# Patient Record
Sex: FEMALE | Race: WHITE | NOT HISPANIC OR LATINO | Employment: OTHER | URBAN - NONMETROPOLITAN AREA
[De-identification: names, ages, dates, MRNs, and addresses within clinical notes are randomized per-mention and may not be internally consistent; named-entity substitution may affect disease eponyms.]

---

## 2020-07-17 ENCOUNTER — CONVERSION ENCOUNTER (OUTPATIENT)
Dept: FAMILY MEDICINE CLINIC | Age: 65
End: 2020-07-17

## 2020-07-17 ENCOUNTER — HOSPITAL ENCOUNTER (OUTPATIENT)
Dept: OTHER | Facility: HOSPITAL | Age: 65
Discharge: HOME | End: 2020-07-17
Attending: FAMILY MEDICINE

## 2020-07-19 LAB — SARS-COV-2 RNA SPEC QL NAA+PROBE: NOT DETECTED

## 2020-09-02 ENCOUNTER — HOSPITAL ENCOUNTER (OUTPATIENT)
Dept: OTHER | Facility: HOSPITAL | Age: 65
Discharge: HOME | End: 2020-09-02
Attending: FAMILY MEDICINE

## 2020-09-06 LAB — SARS-COV-2 RNA SPEC QL NAA+PROBE: NOT DETECTED

## 2021-03-18 ENCOUNTER — OFFICE VISIT CONVERTED (OUTPATIENT)
Dept: FAMILY MEDICINE CLINIC | Age: 66
End: 2021-03-18
Attending: FAMILY MEDICINE

## 2021-03-24 ENCOUNTER — HOSPITAL ENCOUNTER (OUTPATIENT)
Dept: OTHER | Facility: HOSPITAL | Age: 66
Discharge: HOME | End: 2021-03-24
Attending: FAMILY MEDICINE

## 2021-03-24 LAB
ALBUMIN SERPL-MCNC: 4.1 G/DL (ref 3.5–5)
ALBUMIN/GLOB SERPL: 1.5 {RATIO} (ref 1.4–2.6)
ALP SERPL-CCNC: 50 U/L (ref 43–160)
ALT SERPL-CCNC: 16 U/L (ref 10–40)
ANION GAP SERPL CALC-SCNC: 16 MMOL/L (ref 8–19)
AST SERPL-CCNC: 22 U/L (ref 15–50)
BILIRUB SERPL-MCNC: 0.19 MG/DL (ref 0.2–1.3)
BUN SERPL-MCNC: 23 MG/DL (ref 5–25)
BUN/CREAT SERPL: 26 {RATIO} (ref 6–20)
CALCIUM SERPL-MCNC: 9.5 MG/DL (ref 8.7–10.4)
CHLORIDE SERPL-SCNC: 104 MMOL/L (ref 99–111)
CHOLEST SERPL-MCNC: 215 MG/DL (ref 107–200)
CHOLEST/HDLC SERPL: 3.5 {RATIO} (ref 3–6)
CONV CO2: 26 MMOL/L (ref 22–32)
CONV TOTAL PROTEIN: 6.9 G/DL (ref 6.3–8.2)
CREAT UR-MCNC: 0.89 MG/DL (ref 0.5–0.9)
GFR SERPLBLD BASED ON 1.73 SQ M-ARVRAT: >60 ML/MIN/{1.73_M2}
GLOBULIN UR ELPH-MCNC: 2.8 G/DL (ref 2–3.5)
GLUCOSE SERPL-MCNC: 89 MG/DL (ref 65–99)
HDLC SERPL-MCNC: 61 MG/DL (ref 40–60)
LDLC SERPL CALC-MCNC: 140 MG/DL (ref 70–100)
OSMOLALITY SERPL CALC.SUM OF ELEC: 295 MOSM/KG (ref 273–304)
POTASSIUM SERPL-SCNC: 5 MMOL/L (ref 3.5–5.3)
SODIUM SERPL-SCNC: 141 MMOL/L (ref 135–147)
TRIGL SERPL-MCNC: 68 MG/DL (ref 40–150)
VLDLC SERPL-MCNC: 14 MG/DL (ref 5–37)

## 2021-04-07 ENCOUNTER — OFFICE VISIT CONVERTED (OUTPATIENT)
Dept: FAMILY MEDICINE CLINIC | Age: 66
End: 2021-04-07
Attending: FAMILY MEDICINE

## 2021-04-07 ENCOUNTER — HOSPITAL ENCOUNTER (OUTPATIENT)
Dept: OTHER | Facility: HOSPITAL | Age: 66
Discharge: HOME | End: 2021-04-07
Attending: FAMILY MEDICINE

## 2021-04-13 ENCOUNTER — HOSPITAL ENCOUNTER (OUTPATIENT)
Dept: OTHER | Facility: HOSPITAL | Age: 66
Discharge: HOME | End: 2021-04-13
Attending: FAMILY MEDICINE

## 2021-04-16 LAB
ALP SERPL-CCNC: 50 U/L (ref 43–160)
ASO AB SERPL-ACNC: 28 [IU]/ML (ref 0–200)
CALCIUM SERPL-MCNC: 9.2 MG/DL (ref 8.7–10.4)
CONV RHEUMATOID FACTOR IGM: 223.5 [IU]/ML (ref 0–14)
CRP SERPL-MCNC: <3 MG/L (ref 0–5)
DSDNA AB SER-ACNC: NEGATIVE [IU]/ML
ENA AB SER IA-ACNC: NEGATIVE {RATIO}
ERYTHROCYTE [SEDIMENTATION RATE] IN BLOOD: 5 MM/H (ref 0–30)
PHOSPHATE SERPL-MCNC: 3.8 MG/DL (ref 2.4–4.5)
URATE SERPL-MCNC: 6.1 MG/DL (ref 2.5–7.5)

## 2021-05-18 NOTE — PROGRESS NOTES
Jaqueline Katz  1955     Office/Outpatient Visit    Visit Date:  11:30 am    Provider: Rosalinda Irizarry MD (Assistant: Sangeetha Allred MA)    Location: [facility]        Electronically signed by Rosalinda Irizarry MD on  2021 12:04:54 PM                             Subjective:        CC: Destiny is a 65 year old female.  Patient presents today with complaints of arthritis issues;         HPI: Destiny is here today with complaint of hand pain that she feels is due to arthritis.  She has had pain in the bilateral hands, particularly in the knuckles.  Swelling in the third finger of the R hand after she jammed it over  weekend.  She has also noticed pain and swelling at the base of the R thumb.  She is right-handed.  She has some numbness in the L hand.        She has tried topical Voltaren OTC from the pharmacy on the hands and feet but it has only been minimally effective.  She has also had some swelling on the ball of the foot and pain that she has noticed when she goes walking in the park.        She has tried naproxen OTC but that flared her GERD up and gave her stomach pain.    ROS:     CONSTITUTIONAL:  Negative for fatigue and fever.      CARDIOVASCULAR:  Negative for chest pain and palpitations.      RESPIRATORY:  Negative for recent cough and dyspnea.      MUSCULOSKELETAL:  Positive for hand pain.   Negative for arthralgias or myalgias.      NEUROLOGICAL:  Negative for paresthesias and weakness.          Past Medical History / Family History / Social History:         Last Reviewed on 2021 11:36 AM by Rosalinda Irizarry    Past Medical History:                 PAST MEDICAL HISTORY         Hypertension     Gastroesophageal Reflux Disease     Osteoporosis: on Prolia;         Surgical History:         Appendectomy    Cholecystectomy     L shoulder surgery;         Family History:     Father: Myocardial Infarction (  at 80 yo )     Mother: Congestive Heart Failure;   Hyperthyroidism     Sister(s): Endocrine Type 2 Diabetes; Hyperthyroidism     nephew -- hyperthyroidism     aunts -- carotid artery stenosis     aunt - stroke         Social History:     Occupation: Retired (Prior occupation: 28 yo at [employer])     Marital Status:      Children: 3 children         Tobacco/Alcohol/Supplements:     Last Reviewed on 4/07/2021 11:36 AM by Rosalinda Irizarry    Tobacco: She has a past history of cigarette smoking; quit date:  2010.          Substance Abuse History:     Last Reviewed on 4/07/2021 11:36 AM by Rosalinda Irizarry        Mental Health History:     Last Reviewed on 4/07/2021 11:36 AM by Rosalinda Irizarry        Communicable Diseases (eg STDs):     Last Reviewed on 4/07/2021 11:36 AM by Rosalinda Irizarry        Current Problems:     Last Reviewed on 3/18/2021 04:59 PM by Rosalinda Irizarry    Contact with and (suspected) exposure to other viral communicable diseases    Nausea with vomiting, unspecified    Essential (primary) hypertension    Allergic rhinitis due to pollen    Gastro-esophageal reflux disease without esophagitis    Mixed irritable bowel syndrome    Age-related osteoporosis without current pathological fracture        Immunizations:     Influenza, injectable, MDCK, quadrivalent, preservative 11/1/2020    SARS-COV-2 (COVID-19) vaccine, UNSPECIFIED 3/6/2021    Influenza A (H1N1) pf, IM (3+ years) Monovalent 11/10/2009        Allergies:     Last Reviewed on 4/07/2021 11:32 AM by Sangeetha Allred    Penicillins:          Current Medications:     Last Reviewed on 4/07/2021 11:33 AM by Sangeetha Allred    calcium     allergy     B12     D3     FIBER     PROBIOTIC     DULoxetine 60MG     CAP  [TAKE 1 CAPSULE BY MOUTH ONCE DAILY FOR DEPRESSION]    ATENOLOL 25MG       TAB  [TAKE 1 TABLET BY MOUTH ONCE DAILY IN THE MORNING FOR BLOOD PRESSURE]    LISINOPRIL 20MG     TAB  [TAKE 1 TABLET BY MOUTH ONCE DAILY FOR BLOOD PRESSURE]    esomeprazole magnesium 40 mg oral  capsule,delayed release (enteric coated) [take 1 capsule (40 mg) by oral route once daily]    sucralfate 1 gram oral tablet [take 1 tablet (1 gram) by oral route 2 times per day prn nausea/GI upset]    CBD Oil   [twice daily]        Objective:        Vitals:         Current: 4/7/2021 11:33:53 AM    Ht:  4 ft, 11 in;  Wt: 114.8 lbs;  BMI: 23.2T: 97.5 F (temporal);  BP: 131/73 mm Hg (left arm, sitting);  P: 72 bpm (left arm (BP Cuff), sitting);  sCr: 0.89 mg/dL;  GFR: 52.10        Exams:     PHYSICAL EXAM:     GENERAL: vital signs recorded - well developed, well nourished;  well groomed;  no apparent distress;     EYES: extraocular movements intact; conjunctiva and cornea are normal; PERRLA;     RESPIRATORY: normal respiratory rate and pattern with no distress;     MUSCULOSKELETAL: normal gait; normal overall tone bilateral hands -- 1st MCP swelling bilaterally, no erythema or warmth, mildly TTP; mild deformity in scattered DIPs both hands; +TTP at the R thumb base, no swelling, erythema or warmth        Assessment:         M19.042   Primary osteoarthritis, left hand       M19.041   Primary osteoarthritis, right hand       M19.071   Primary osteoarthritis, right ankle and foot       M19.072   Primary osteoarthritis, left ankle and foot           ORDERS:         Meds Prescribed:       [New Rx] meloxicam 15 mg oral tablet [take 1 tablet (15 mg) by oral route once daily with food prn hand and foot pain], #30 (thirty) tablets, Refills: 5 (five)         Radiology/Test Orders:       [id number]  Xray Hand, 3 Views; Bilateral  (Send-Out)                      Plan:         Primary osteoarthritis, left handPam has been having pain diffusely in the joints of the bilateral hands as described in the HPI.  Deformity as per PEx is consistent with arthritis.  Checking XRs of the hands to rule out lytic changes in the MCPs, but she states she has been diagnosed with OA in the past.  Starting daily meloxicam prn pain (take with food as she  is struggling with some GERD and could not tolerate naproxen).  This will hopefully help with the foot pain as well.  OK to continue use of topical Voltaren.        RADIOLOGY:  I have ordered a Bilateral hand hand x-ray to be done today.      FOLLOW-UP: Keep currently scheduled appointment.:.            Prescriptions:       [New Rx] meloxicam 15 mg oral tablet [take 1 tablet (15 mg) by oral route once daily with food prn hand and foot pain], #30 (thirty) tablets, Refills: 5 (five)           Orders:       [id number]  Xray Hand, 3 Views; Bilateral  (Send-Out)              Primary osteoarthritis, right handAs above.        Primary osteoarthritis, right ankle and footAs above.        Primary osteoarthritis, left ankle and footAs above.            Patient Recommendations:        For  Primary osteoarthritis, left hand:                    APPOINTMENT INFORMATION:        Monday Tuesday Wednesday Thursday Friday Saturday Sunday            Time:___________________AM  PM   Date:_____________________             Charge Capture:         Primary Diagnosis:     M19.042  Primary osteoarthritis, left hand           Orders:      [id number]  Office/outpatient visit; established patient, level 3  (In-House)              M19.041  Primary osteoarthritis, right hand     M19.071  Primary osteoarthritis, right ankle and foot     M19.072  Primary osteoarthritis, left ankle and foot

## 2021-05-18 NOTE — PROGRESS NOTES
Jaqueline Katz  1955     Office/Outpatient Visit    Visit Date: Thu, Mar 18, 2021 04:06 pm    Provider: Rosalinda Irizarry MD (Assistant: Julia Chow MA)    Location: [facility]        Electronically signed by Rosalinda Irizarry MD on  03/19/2021 10:42:47 PM                             Subjective:        CC: Destiny is a 65 year old female.  This is her first visit to the clinic.          HPI: Destiny is here today to establish care with me.  She is Denisha Katz' mother-in-law.  She was previously being seen down in [place].        She is on lisinopril and atenolol for HTN.  BP has been well controlled.  No CP, palpitations, SOB.        She is on duloxetine for mild depression.  She started having more problems with anxiety and depression when she stopped smoking.          She does have chronic allergies.   She uses an OTC antihistamine and Flonase.  She went to the [facility] 1 month ago for a sinus infection.  She was really feeling bad at that time.  She got an antibiotic at that time -- thinks it might have been cipro -- for 10 days.  Shortly after starting that, she started having nausea and vomiting.  Denisha told her that she needed to go get a COVID test, which was negative.  However, she has been continuing to have stomach upset including nausea (vomiting has resolved) as well as alternating constipation and diarrhea.  She has had problems with this in the past and has wondered if she might have IBS.  She has hemorrhoids, so she cannot tell if she has been having worsened hematochezia.  No melena at any rate.  She did go get a probiotic about 2 weeks ago and has been using that, but can't tell if she is doing better.  She is on Nexium OTC for acid reflux, which is also a problem for her.  +Bloating.        After she noticed the constipation and diarrhea, she started feeling a vaginal bulge.  Also noted clear mucus-y discharge.        She has osteoporosis in the hip on her last DEXA.  Went on  Prolia for that but has not been able to get it recently.  She was due for her shot in January.    ROS:     CONSTITUTIONAL:  Negative for fatigue and fever.      EYES:  Negative for blurred vision.      E/N/T:  Negative for diminished hearing and nasal congestion.      CARDIOVASCULAR:  Negative for chest pain and palpitations.      RESPIRATORY:  Negative for recent cough and dyspnea.      GASTROINTESTINAL:  Positive for diarrhea, nausea and vomiting.   Negative for abdominal pain or constipation.      MUSCULOSKELETAL:  Negative for arthralgias and myalgias.      NEUROLOGICAL:  Negative for paresthesias and weakness.      PSYCHIATRIC:  Negative for anxiety, depression and sleep disturbance.          Past Medical History / Family History / Social History:         Last Reviewed on 3/18/2021 04:59 PM by Rosalinda Irizarry    Past Medical History:                 PAST MEDICAL HISTORY         Hypertension     Gastroesophageal Reflux Disease     Osteoporosis: on Prolia;         Surgical History:         Appendectomy    Cholecystectomy     L shoulder surgery;         Family History:     Father: Myocardial Infarction (  at 78 yo )     Mother: Congestive Heart Failure;  Hyperthyroidism     Sister(s): Endocrine Type 2 Diabetes; Hyperthyroidism     nephew -- hyperthyroidism     aunts -- carotid artery stenosis     aunt - stroke         Social History:     Occupation: Retired (Prior occupation: 28 yo at [employer])     Marital Status:      Children: 3 children         Tobacco/Alcohol/Supplements:     Last Reviewed on 3/18/2021 04:59 PM by Rosalinda Irizarry    Tobacco: She has a past history of cigarette smoking; quit date:  .          Substance Abuse History:     Last Reviewed on 3/18/2021 04:59 PM by Rosalinda Irizarry        Mental Health History:     Last Reviewed on 3/18/2021 04:59 PM by Rosalinda Irizarry        Communicable Diseases (eg STDs):     Last Reviewed on 3/18/2021 04:59 PM by Rosalinda Irizarry         Current Problems:     Contact with and (suspected) exposure to other viral communicable diseases    Nausea with vomiting, unspecified        Immunizations:     Influenza A (H1N1) pf, IM (3+ years) Monovalent 11/10/2009        Allergies:     Last Reviewed on 3/18/2021 04:20 PM by Julia Chow    Penicillins:          Current Medications:     Last Reviewed on 3/18/2021 04:21 PM by Julia Chow    calcium     allergy     B12     D3     FIBER     PROBIOTIC     DULoxetine 60MG     CAP  [TAKE 1 CAPSULE BY MOUTH ONCE DAILY FOR DEPRESSION]    ATENOLOL 25MG       TAB  [TAKE 1 TABLET BY MOUTH ONCE DAILY IN THE MORNING FOR BLOOD PRESSURE]    LISINOPRIL 20MG     TAB  [TAKE 1 TABLET BY MOUTH ONCE DAILY FOR BLOOD PRESSURE]        Objective:        Vitals:         Current: 3/18/2021 4:19:13 PM    Wt: 117.8 lbsT: 97.6 F (temporal);  BP: 135/66 mm Hg (left arm, sitting);  P: 80 bpm (left arm (BP Cuff), sitting)        Exams:     PHYSICAL EXAM:     GENERAL: vital signs recorded - well developed, well nourished;  well groomed;  no apparent distress;     EYES: extraocular movements intact; conjunctiva and cornea are normal; PERRLA;     E/N/T: OROPHARYNX:  normal mucosa, dentition, gingiva, and posterior pharynx;     NECK: range of motion is normal; thyroid exam reveals not enlarged;     RESPIRATORY: normal respiratory rate and pattern with no distress; normal breath sounds with no rales, rhonchi, wheezes or rubs;     CARDIOVASCULAR: normal rate; rhythm is regular;  no systolic murmur; no edema;     GASTROINTESTINAL: nontender; normal bowel sounds;     LYMPHATIC: no enlargement of cervical or facial nodes;     MUSCULOSKELETAL: normal gait; normal overall tone     NEUROLOGIC: mental status: alert and oriented x 3; Reflexes: brachioradialis: 2+; knee jerks: 2+;     PSYCHIATRIC: appropriate affect and demeanor; normal psychomotor function; normal speech pattern;         Assessment:         I10   Essential (primary)  hypertension       K21.9   Gastro-esophageal reflux disease without esophagitis       K58.2   Mixed irritable bowel syndrome       J30.1   Allergic rhinitis due to pollen       M81.0   Age-related osteoporosis without current pathological fracture           ORDERS:         Meds Prescribed:       [New Rx] esomeprazole magnesium 40 mg oral capsule,delayed release (enteric coated) [take 1 capsule (40 mg) by oral route once daily], #30 (thirty) capsules, Refills: 2 (two)       [New Rx] sucralfate 1 gram oral tablet [take 1 tablet (1 gram) by oral route 2 times per day prn nausea/GI upset], #60 (sixty) tablets, Refills: 0 (zero)         Lab Orders:       [id number]  HTNLP - H CMP AND LIPID: [id number], [id number]  (Send-Out)            APPTO  Appointment need  (In-House)              Other Orders:         Depression screen negative  (In-House)                      Plan:         Essential (primary) hypertensionBP at goal.  No refills needed today.  Checking labs.  RTC 3 months to follow up on the belly.    LABORATORY:  Labs ordered to be performed today include HTN/Lipid Panel: CMP, Lipid.  MIPS PHQ-9 Depression Screening: Completed form scanned and in chart; Total Score 3; Negative Depression Screen     FOLLOW-UP: Schedule a follow-up visit in 3 months.:.  f/u GERD          Orders:       [id number]  HTNLP - HMH CMP AND LIPID: [id number], [id number]  (Send-Out)            APPTO  Appointment need  (In-House)              Depression screen negative  (In-House)              Gastro-esophageal reflux disease without esophagitisHer GERD has been much worse since stopping smoking.  Increasing her Nexium from 20 mg to 40 mg and adding sucralfate as well.  I will see her back in 3 months to see if we need to repeat her scope.  She reports last one was done 2 years ago.          Prescriptions:       [New Rx] esomeprazole magnesium 40 mg oral capsule,delayed release (enteric coated) [take 1 capsule (40 mg) by oral route once daily], #30 (thirty)  capsules, Refills: 2 (two)       [New Rx] sucralfate 1 gram oral tablet [take 1 tablet (1 gram) by oral route 2 times per day prn nausea/GI upset], #60 (sixty) tablets, Refills: 0 (zero)         Mixed irritable bowel syndromeContinue probiotic and daily fiber supplement.        Allergic rhinitis due to pollenContinue Flonase and OTC antihistamine.        Age-related osteoporosis without current pathological fractureOrder placed for Prolia.  She was due for her next injection back in January.  Labs ordered.  Records requested for DEXA with her other records.              Patient Recommendations:        For  Essential (primary) hypertension:    Schedule a follow-up visit in 3 months.                APPOINTMENT INFORMATION:        Monday Tuesday Wednesday Thursday Friday Saturday Sunday            Time:___________________AM  PM   Date:_____________________             Charge Capture:         Primary Diagnosis:     I10  Essential (primary) hypertension           Orders:      [id number]  Office/outpatient visit; established patient, level 4  (In-House)            APPTO  Appointment need  (In-House)              Depression screen negative  (In-House)              K21.9  Gastro-esophageal reflux disease without esophagitis     K58.2  Mixed irritable bowel syndrome     J30.1  Allergic rhinitis due to pollen     M81.0  Age-related osteoporosis without current pathological fracture

## 2021-06-15 ENCOUNTER — TELEPHONE (OUTPATIENT)
Dept: FAMILY MEDICINE CLINIC | Age: 66
End: 2021-06-15

## 2021-06-15 ENCOUNTER — OFFICE VISIT (OUTPATIENT)
Dept: FAMILY MEDICINE CLINIC | Age: 66
End: 2021-06-15

## 2021-06-15 VITALS
TEMPERATURE: 98.3 F | BODY MASS INDEX: 22.74 KG/M2 | WEIGHT: 112.8 LBS | SYSTOLIC BLOOD PRESSURE: 126 MMHG | HEART RATE: 67 BPM | HEIGHT: 59 IN | DIASTOLIC BLOOD PRESSURE: 71 MMHG

## 2021-06-15 DIAGNOSIS — F33.1 MODERATE EPISODE OF RECURRENT MAJOR DEPRESSIVE DISORDER (HCC): ICD-10-CM

## 2021-06-15 DIAGNOSIS — K21.9 GASTROESOPHAGEAL REFLUX DISEASE WITHOUT ESOPHAGITIS: ICD-10-CM

## 2021-06-15 DIAGNOSIS — F41.1 GENERALIZED ANXIETY DISORDER: ICD-10-CM

## 2021-06-15 DIAGNOSIS — K58.8 OTHER IRRITABLE BOWEL SYNDROME: ICD-10-CM

## 2021-06-15 DIAGNOSIS — M81.0 AGE-RELATED OSTEOPOROSIS WITHOUT CURRENT PATHOLOGICAL FRACTURE: ICD-10-CM

## 2021-06-15 DIAGNOSIS — R76.8 ELEVATED RHEUMATOID FACTOR: Primary | ICD-10-CM

## 2021-06-15 DIAGNOSIS — R76.8 ELEVATED RHEUMATOID FACTOR: ICD-10-CM

## 2021-06-15 DIAGNOSIS — M15.9 PRIMARY OSTEOARTHRITIS INVOLVING MULTIPLE JOINTS: ICD-10-CM

## 2021-06-15 DIAGNOSIS — M25.50 ARTHRALGIA, UNSPECIFIED JOINT: ICD-10-CM

## 2021-06-15 DIAGNOSIS — I10 ESSENTIAL HYPERTENSION: Primary | ICD-10-CM

## 2021-06-15 PROBLEM — F32.9 MAJOR DEPRESSIVE DISORDER: Status: ACTIVE | Noted: 2021-01-27

## 2021-06-15 PROBLEM — M15.0 PRIMARY OSTEOARTHRITIS INVOLVING MULTIPLE JOINTS: Status: ACTIVE | Noted: 2021-01-27

## 2021-06-15 PROBLEM — M19.90 OSTEOARTHRITIS: Status: ACTIVE | Noted: 2021-01-27

## 2021-06-15 PROBLEM — F33.9 RECURRENT MAJOR DEPRESSIVE DISORDER: Status: ACTIVE | Noted: 2021-01-27

## 2021-06-15 PROCEDURE — 99214 OFFICE O/P EST MOD 30 MIN: CPT | Performed by: FAMILY MEDICINE

## 2021-06-15 RX ORDER — ATENOLOL 25 MG/1
1 TABLET ORAL DAILY
COMMUNITY
Start: 2021-06-03 | End: 2021-12-22

## 2021-06-15 RX ORDER — ESOMEPRAZOLE MAGNESIUM 40 MG/1
1 CAPSULE, DELAYED RELEASE ORAL DAILY
COMMUNITY
End: 2021-06-15 | Stop reason: SDUPTHER

## 2021-06-15 RX ORDER — HYDROXYZINE PAMOATE 25 MG/1
1 CAPSULE ORAL DAILY
COMMUNITY
End: 2022-01-26 | Stop reason: SDUPTHER

## 2021-06-15 RX ORDER — MELOXICAM 15 MG/1
1 TABLET ORAL DAILY PRN
COMMUNITY
Start: 2021-06-02 | End: 2021-10-21

## 2021-06-15 RX ORDER — LOSARTAN POTASSIUM 50 MG/1
50 TABLET ORAL 2 TIMES DAILY
COMMUNITY
Start: 2021-06-02 | End: 2021-06-15 | Stop reason: SDUPTHER

## 2021-06-15 RX ORDER — ESOMEPRAZOLE MAGNESIUM 40 MG/1
40 CAPSULE, DELAYED RELEASE ORAL DAILY
Qty: 90 CAPSULE | Refills: 1 | Status: SHIPPED | OUTPATIENT
Start: 2021-06-15 | End: 2021-09-23

## 2021-06-15 RX ORDER — DENOSUMAB 60 MG/ML
1 INJECTION SUBCUTANEOUS
COMMUNITY

## 2021-06-15 RX ORDER — LOSARTAN POTASSIUM 100 MG/1
100 TABLET ORAL DAILY
Qty: 90 TABLET | Refills: 1 | Status: SHIPPED | OUTPATIENT
Start: 2021-06-15 | End: 2022-06-02

## 2021-06-15 RX ORDER — DULOXETIN HYDROCHLORIDE 60 MG/1
1 CAPSULE, DELAYED RELEASE ORAL DAILY
COMMUNITY
Start: 2021-04-18 | End: 2021-08-03 | Stop reason: SDUPTHER

## 2021-06-15 RX ORDER — OLOPATADINE HYDROCHLORIDE 1 MG/ML
SOLUTION/ DROPS OPHTHALMIC
COMMUNITY

## 2021-06-15 NOTE — PROGRESS NOTES
Chief Complaint  Hypertension    Subjective          Jaqueline Katz presents to [facility] today for routine f/u.    We diagnosed her with inflammatory arthritis after her last visit when XRs showed erosive changes in the bilateral MCPs.  She was referred to Rheum but Dr. Staci Kwan could not get her in until 2022.  She then checked into another rheumatologist in [place] (Dr. Bibi Smiley) and Mishel is working on getting that referral placed for her.  She is using the meloxicam but it is only minimally effective.  She has been using Voltaren gel with out any improvement either.  She is wearing a wrist brace to help support the R wrist.      She is on lisinopril and atenolol for HTN.  BP has been well controlled.  No CP, palpitations, or SOB.    She is on duloxetine for mild depression.  She started having more problems with anxiety and depression when she stopped smoking (back in 2013).  Lately, she does feel that she has a lot of stress, although she thinks some days that the Cymbalta does really well for her.  She has been on Xanax in the past but stopped it because she was afraid of getting addicted.  She does have hydroxyzine but has been hesitant to use it because she was afraid that might be addictive as well.      She is still having some epigastric pain.  She is on Nexium for heartburn.    She has osteoporosis in the hip on her last DEXA.  She is on Prolia.        Current Outpatient Medications:   •  atenolol (TENORMIN) 25 MG tablet, Take 1 tablet by mouth Daily., Disp: , Rfl:   •  denosumab (Prolia) 60 MG/ML solution prefilled syringe syringe, 1 mL Every 6 (Six) Months., Disp: , Rfl:   •  DULoxetine (CYMBALTA) 60 MG capsule, Take 1 capsule by mouth Daily., Disp: , Rfl:   •  esomeprazole (nexIUM) 40 MG capsule, Take 1 capsule by mouth Daily., Disp: 90 capsule, Rfl: 1  •  hydrOXYzine pamoate (VISTARIL) 25 MG capsule, Take 1 capsule by mouth Daily., Disp: , Rfl:   •  losartan  "(COZAAR) 100 MG tablet, Take 1 tablet by mouth Daily., Disp: 90 tablet, Rfl: 1  •  meloxicam (MOBIC) 15 MG tablet, Take 1 tablet by mouth Daily As Needed., Disp: , Rfl:   •  olopatadine (Patanol) 0.1 % ophthalmic solution, Patanol 0.1 % eye drops  INSTILL 1 DROP INTO AFFECTED EYE(S) BY OPHTHALMIC ROUTE 2 TIMES PER DAY AT AN INTERVAL OF 6 TO 8 HOURS, Disp: , Rfl:     Allergies:  Penicillins and Nsaids      Objective   Vital Signs:   /71 (BP Location: Right arm, Patient Position: Sitting)   Pulse 67   Temp 98.3 °F (36.8 °C) (Oral)   Ht 149.9 cm (59\")   Wt 51.2 kg (112 lb 12.8 oz)   BMI 22.78 kg/m²     Physical Exam  Vitals reviewed.   Constitutional:       General: She is not in acute distress.     Appearance: Normal appearance. She is well-developed.   HENT:      Head: Normocephalic and atraumatic.      Right Ear: External ear normal.      Left Ear: External ear normal.      Nose: Nose normal.      Mouth/Throat:      Mouth: Mucous membranes are moist.   Eyes:      Extraocular Movements: Extraocular movements intact.      Conjunctiva/sclera: Conjunctivae normal.      Pupils: Pupils are equal, round, and reactive to light.   Cardiovascular:      Rate and Rhythm: Normal rate and regular rhythm.      Heart sounds: No murmur heard.     Pulmonary:      Effort: Pulmonary effort is normal.      Breath sounds: Normal breath sounds. No wheezing, rhonchi or rales.   Abdominal:      General: Bowel sounds are normal. There is no distension.      Palpations: Abdomen is soft.      Tenderness: There is no abdominal tenderness.   Musculoskeletal:         General: Normal range of motion.   Neurological:      Mental Status: She is alert.   Psychiatric:         Mood and Affect: Affect normal.             Assessment and Plan    Diagnoses and all orders for this visit:    1. Essential hypertension (Primary)  Assessment & Plan:  BP at goal.  She has been taking the losartan 50 mg BID, so I am going to send in 100 mg daily for " her to simplify her regimen.  No changes to atenolol.  Labs reviewed and  UTD.  RTC 4 months for AWV.    Orders:  -     losartan (COZAAR) 100 MG tablet; Take 1 tablet by mouth Daily.  Dispense: 90 tablet; Refill: 1    2. Primary osteoarthritis involving multiple joints  Assessment & Plan:  It looks like she may also have inflammatory rheumatoid arthritis based on imaging and labs.  Meloxicam and Voltaren gel have been ineffective for her symptom control.  We are proceeding with referral to rheumatology per elevated rheumatoid factor plan.      3. Moderate episode of recurrent major depressive disorder (CMS/HCC)  Assessment & Plan:  Mood is pretty good but anxiety is giving her trouble.        4. Generalized anxiety disorder  Assessment & Plan:  As per depression plan.      5. Gastroesophageal reflux disease without esophagitis  Assessment & Plan:  She is not sure if she is feeling anxiety vs GERD.  Refill sent on Nexium and we will continue to observe as we get her anxiety under better control.    Orders:  -     esomeprazole (nexIUM) 40 MG capsule; Take 1 capsule by mouth Daily.  Dispense: 90 capsule; Refill: 1    6. Other irritable bowel syndrome    7. Age-related osteoporosis without current pathological fracture    8. Elevated rheumatoid factor  Assessment & Plan:  She has erosive changes in the bilateral MCPs on hand XRs along with elevated rheumatoid factor on recent labs.  She would like to be seen by Dr. Bibi Smiley Rheum in [place] so that referral has been placed.  We will check in and see how that is going.        Follow Up   Return in about 4 months (around 10/15/2021) for Medicare Wellness.  Patient was given instructions and counseling regarding her condition or for health maintenance advice. Please see specific information pulled into the AVS if appropriate.

## 2021-06-15 NOTE — ASSESSMENT & PLAN NOTE
She is not sure if she is feeling anxiety vs GERD.  Refill sent on Nexium and we will continue to observe as we get her anxiety under better control.

## 2021-06-15 NOTE — ASSESSMENT & PLAN NOTE
She has erosive changes in the bilateral MCPs on hand XRs along with elevated rheumatoid factor on recent labs.  She would like to be seen by Dr. Bibi Smiley Rheum in [place] so that referral has been placed.  We will check in and see how that is going.

## 2021-06-15 NOTE — ASSESSMENT & PLAN NOTE
It looks like she may also have inflammatory rheumatoid arthritis based on imaging and labs.  Meloxicam and Voltaren gel have been ineffective for her symptom control.  We are proceeding with referral to rheumatology per elevated rheumatoid factor plan.

## 2021-06-16 RX ORDER — LOSARTAN POTASSIUM 50 MG/1
TABLET ORAL
Qty: 30 TABLET | Refills: 5 | OUTPATIENT
Start: 2021-06-16

## 2021-07-02 VITALS
WEIGHT: 117.8 LBS | DIASTOLIC BLOOD PRESSURE: 66 MMHG | SYSTOLIC BLOOD PRESSURE: 135 MMHG | TEMPERATURE: 97.6 F | HEART RATE: 80 BPM

## 2021-07-02 VITALS
HEIGHT: 59 IN | BODY MASS INDEX: 23.14 KG/M2 | DIASTOLIC BLOOD PRESSURE: 73 MMHG | SYSTOLIC BLOOD PRESSURE: 131 MMHG | HEART RATE: 72 BPM | WEIGHT: 114.8 LBS | TEMPERATURE: 97.5 F

## 2021-07-02 VITALS — TEMPERATURE: 97.3 F

## 2021-07-07 ENCOUNTER — TELEPHONE (OUTPATIENT)
Dept: FAMILY MEDICINE CLINIC | Age: 66
End: 2021-07-07

## 2021-07-07 NOTE — TELEPHONE ENCOUNTER
Caller: Jaqueline Katz    Relationship: Self    Best call back number: [phone]    Who are you requesting to speak with (clinical staff, provider,  specific staff member): MEDICAL STAFF    What was the call regarding: PATIENT STATED THAT RHEUMATOLOGY OFFICE THAT SHE IS REFERRED TO NEEDS OUR OFFICE TO FILL OUT THEIR REFERRAL FORM. PLEASE CALL PATIENT TO ADVISE.

## 2021-07-07 NOTE — TELEPHONE ENCOUNTER
Spoke with pt and informed her that they will need to fax us the form they want us to fill out. Also to make sure they put her name on the referral form so we will know what pt it is for.

## 2021-08-03 RX ORDER — DULOXETIN HYDROCHLORIDE 60 MG/1
60 CAPSULE, DELAYED RELEASE ORAL DAILY
Qty: 90 CAPSULE | Refills: 0 | Status: SHIPPED | OUTPATIENT
Start: 2021-08-03 | End: 2021-11-09

## 2021-08-03 NOTE — TELEPHONE ENCOUNTER
Caller: Sterling Jaqueline    Relationship: Self    Best call back number: [phone]    Medication needed:   Requested Prescriptions     Pending Prescriptions Disp Refills   • DULoxetine (CYMBALTA) 60 MG capsule       Sig: Take 1 capsule by mouth Daily.       When do you need the refill by: 08/10        Does the patient have less than a 3 day supply:  [] Yes  [x] No    What is the patient's preferred pharmacy: [address] - [phone] [facility] [phone] FX

## 2021-08-13 PROBLEM — M19.90 INFLAMMATORY ARTHRITIS: Status: ACTIVE | Noted: 2021-08-13

## 2021-09-23 DIAGNOSIS — K21.9 GASTROESOPHAGEAL REFLUX DISEASE WITHOUT ESOPHAGITIS: ICD-10-CM

## 2021-09-23 RX ORDER — ESOMEPRAZOLE MAGNESIUM 40 MG/1
CAPSULE, DELAYED RELEASE ORAL
Qty: 90 CAPSULE | Refills: 1 | Status: SHIPPED | OUTPATIENT
Start: 2021-09-23 | End: 2022-04-26

## 2021-10-21 ENCOUNTER — OFFICE VISIT (OUTPATIENT)
Dept: FAMILY MEDICINE CLINIC | Age: 66
End: 2021-10-21

## 2021-10-21 VITALS
SYSTOLIC BLOOD PRESSURE: 122 MMHG | DIASTOLIC BLOOD PRESSURE: 71 MMHG | HEIGHT: 59 IN | WEIGHT: 111 LBS | HEART RATE: 70 BPM | BODY MASS INDEX: 22.38 KG/M2

## 2021-10-21 DIAGNOSIS — M05.742 RHEUMATOID ARTHRITIS INVOLVING BOTH HANDS WITH POSITIVE RHEUMATOID FACTOR (HCC): ICD-10-CM

## 2021-10-21 DIAGNOSIS — F41.1 GENERALIZED ANXIETY DISORDER: ICD-10-CM

## 2021-10-21 DIAGNOSIS — H61.23 BILATERAL IMPACTED CERUMEN: ICD-10-CM

## 2021-10-21 DIAGNOSIS — I10 ESSENTIAL HYPERTENSION: ICD-10-CM

## 2021-10-21 DIAGNOSIS — Z11.59 ENCOUNTER FOR SCREENING FOR OTHER VIRAL DISEASES: ICD-10-CM

## 2021-10-21 DIAGNOSIS — Z00.00 ANNUAL PHYSICAL EXAM: Primary | ICD-10-CM

## 2021-10-21 DIAGNOSIS — M05.741 RHEUMATOID ARTHRITIS INVOLVING BOTH HANDS WITH POSITIVE RHEUMATOID FACTOR (HCC): ICD-10-CM

## 2021-10-21 PROBLEM — R76.8 ELEVATED RHEUMATOID FACTOR: Status: RESOLVED | Noted: 2021-06-15 | Resolved: 2021-10-21

## 2021-10-21 PROCEDURE — G0439 PPPS, SUBSEQ VISIT: HCPCS | Performed by: FAMILY MEDICINE

## 2021-10-21 PROCEDURE — 69209 REMOVE IMPACTED EAR WAX UNI: CPT | Performed by: FAMILY MEDICINE

## 2021-10-21 PROCEDURE — 1125F AMNT PAIN NOTED PAIN PRSNT: CPT | Performed by: FAMILY MEDICINE

## 2021-10-21 PROCEDURE — 1160F RVW MEDS BY RX/DR IN RCRD: CPT | Performed by: FAMILY MEDICINE

## 2021-10-21 PROCEDURE — 1170F FXNL STATUS ASSESSED: CPT | Performed by: FAMILY MEDICINE

## 2021-10-21 RX ORDER — BUSPIRONE HYDROCHLORIDE 5 MG/1
5 TABLET ORAL 2 TIMES DAILY PRN
Qty: 60 TABLET | Refills: 5 | Status: SHIPPED | OUTPATIENT
Start: 2021-10-21 | End: 2022-01-26 | Stop reason: SDUPTHER

## 2021-10-21 RX ORDER — PREDNISONE 1 MG/1
10 TABLET ORAL DAILY
COMMUNITY
Start: 2021-09-28 | End: 2022-01-26 | Stop reason: SDUPTHER

## 2021-10-21 NOTE — PROGRESS NOTES
Ear Cerumen Removal    Date/Time: 10/21/2021 11:26 AM  Performed by: Rosalinda Irizarry MD  Authorized by: Rosalinda Irizarry MD     Anesthesia:  Local Anesthetic: none  Location details: right ear and left ear  Patient tolerance: patient tolerated the procedure well with no immediate complications  Procedure type: irrigation   Sedation:  Patient sedated: no

## 2021-10-21 NOTE — PROGRESS NOTES
The ABCs of the Annual Wellness Visit  Subsequent Medicare Wellness Visit    Chief Complaint   Patient presents with   • Medicare Wellness-subsequent     Yearly MCW      Subjective    History of Present Illness:  Jaqueline Katz is a 66 y.o. female who presents for a Subsequent Medicare Wellness Visit.    She is reportedly UTD on colonoscopy, last done at [facility] she thinks less than 5 years ago.  Pap smears no longer indicated by age and history.  She is reportedly UTD on mammogram, last done Nov 2020/Jan 2021 at [facility], sometime over the winter, and that was normal.  She is reportedly UTD on DEXA, last done within the past 2 years at [facility].  She is on Prolia for osteoporosis.  She is up-to-date on Covid (Moderna), Pneumovax (1/2021) and Prevnar (1/2020).  She is due for Shingrix, Td and high-dose flu.  She is due for routine labs including CMP, lipids and hep C.    She is now following with Dr. Tony Faria [facility] ([facility]) for her RA.  She is on prednisone currently but planning to transition to methotrexate.  She goes back to in 2 months and will be getting standing order for labs monthly.    The following portions of the patient's history were reviewed and   updated as appropriate: allergies, current medications, past family history, past medical history, past social history, past surgical history and problem list.    Compared to one year ago, the patient feels her physical   health is the same.    Compared to one year ago, the patient feels her mental   health is worse.    Recent Hospitalizations:  She was not admitted to the hospital during the last year.       Current Medical Providers:  Patient Care Team:  Rosalinda Irizarry MD as PCP - General (Family Medicine)    Outpatient Medications Prior to Visit   Medication Sig Dispense Refill   • atenolol (TENORMIN) 25 MG tablet Take 1 tablet by mouth Daily.     • denosumab (Prolia) 60 MG/ML solution prefilled syringe  syringe 1 mL Every 6 (Six) Months.     • DULoxetine (CYMBALTA) 60 MG capsule Take 1 capsule by mouth Daily. 90 capsule 0   • esomeprazole (nexIUM) 40 MG capsule TAKE ONE CAPSULE BY MOUTH EVERY DAY 90 capsule 1   • hydrOXYzine pamoate (VISTARIL) 25 MG capsule Take 1 capsule by mouth Daily.     • losartan (COZAAR) 100 MG tablet Take 1 tablet by mouth Daily. 90 tablet 1   • olopatadine (Patanol) 0.1 % ophthalmic solution Patanol 0.1 % eye drops   INSTILL 1 DROP INTO AFFECTED EYE(S) BY OPHTHALMIC ROUTE 2 TIMES PER DAY AT AN INTERVAL OF 6 TO 8 HOURS     • predniSONE (DELTASONE) 5 MG tablet Take 10 mg by mouth Daily.     • meloxicam (MOBIC) 15 MG tablet Take 1 tablet by mouth Daily As Needed.       No facility-administered medications prior to visit.       No opioid medication identified on active medication list. I have reviewed chart for other potential  high risk medication/s and harmful drug interactions in the elderly.          Aspirin is not on active medication list.  Aspirin use is not indicated based on review of current medical condition/s. Risk of harm outweighs potential benefits.  .    Patient Active Problem List   Diagnosis   • Essential hypertension   • Gastroesophageal reflux disease without esophagitis   • Primary osteoarthritis involving multiple joints   • Moderate episode of recurrent major depressive disorder (HCC)   • Generalized anxiety disorder   • Other irritable bowel syndrome   • Age-related osteoporosis without current pathological fracture   • Rheumatoid arthritis involving both hands with positive rheumatoid factor (HCC)   • Annual physical exam     Advance Care Planning  Advance Directive is not on file.  ACP discussion was held with the patient during this visit. Patient does not have an advance directive, information provided.    Review of Systems   Constitutional: Negative for chills, fatigue and fever.   HENT: Positive for ear pain (minimal, R ear) and hearing loss (worse on the R x 2  "weeks). Negative for congestion and rhinorrhea.    Eyes: Negative for pain and visual disturbance.   Respiratory: Negative for cough and shortness of breath.    Cardiovascular: Negative for chest pain and palpitations.   Gastrointestinal: Negative for abdominal pain, constipation, diarrhea, nausea and vomiting.   Genitourinary: Negative for difficulty urinating and dysuria.   Musculoskeletal: Positive for arthralgias. Negative for myalgias.   Neurological: Negative for weakness and numbness.   Psychiatric/Behavioral: Negative for dysphoric mood and sleep disturbance.        Objective    Vitals:    10/21/21 1038   BP: 122/71   BP Location: Left arm   Patient Position: Sitting   Cuff Size: Adult   Pulse: 70   Weight: 50.3 kg (111 lb)   Height: 149.9 cm (59\")     BMI Readings from Last 1 Encounters:   10/21/21 22.42 kg/m²   BMI is within normal parameters. No follow-up required.    Does the patient have evidence of cognitive impairment? No    Physical Exam  Vitals reviewed.   Constitutional:       General: She is not in acute distress.     Appearance: Normal appearance. She is well-developed.   HENT:      Head: Normocephalic and atraumatic.      Right Ear: External ear normal. There is impacted cerumen (complete).      Left Ear: External ear normal. There is impacted cerumen (partial).      Mouth/Throat:      Mouth: Mucous membranes are moist.   Eyes:      Extraocular Movements: Extraocular movements intact.      Conjunctiva/sclera: Conjunctivae normal.      Pupils: Pupils are equal, round, and reactive to light.   Cardiovascular:      Rate and Rhythm: Normal rate and regular rhythm.      Heart sounds: No murmur heard.      Pulmonary:      Effort: Pulmonary effort is normal.      Breath sounds: Normal breath sounds. No wheezing, rhonchi or rales.   Abdominal:      General: Bowel sounds are normal. There is no distension.      Palpations: Abdomen is soft.      Tenderness: There is no abdominal tenderness. "   Musculoskeletal:         General: Normal range of motion.   Skin:     General: Skin is warm and dry.   Neurological:      Mental Status: She is alert and oriented to person, place, and time.      Deep Tendon Reflexes: Reflexes normal.   Psychiatric:         Mood and Affect: Mood and affect normal.         Behavior: Behavior normal.         Thought Content: Thought content normal.         Judgment: Judgment normal.                 HEALTH RISK ASSESSMENT    Smoking Status:  Social History     Tobacco Use   Smoking Status Former Smoker   • Types: Cigarettes   • Quit date:    • Years since quittin.8   Smokeless Tobacco Never Used     Alcohol Consumption:  Social History     Substance and Sexual Activity   Alcohol Use None     Fall Risk Screen:    STEADI Fall Risk Assessment was completed, and patient is at LOW risk for falls.Assessment completed on:6/15/2021    Depression Screening:  PHQ-2/PHQ-9 Depression Screening 6/15/2021   Little interest or pleasure in doing things 0   Feeling down, depressed, or hopeless 0   Total Score 0       Health Habits and Functional and Cognitive Screening:  Functional & Cognitive Status 10/21/2021   Do you have difficulty preparing food and eating? No   Do you have difficulty bathing yourself, getting dressed or grooming yourself? No   Do you have difficulty using the toilet? No   Do you have difficulty moving around from place to place? No   Do you have trouble with steps or getting out of a bed or a chair? No   Current Diet Unhealthy Diet   Dental Exam Up to date   Eye Exam Up to date   Exercise (times per week) 4 times per week   Current Exercises Include Walking   Do you need help using the phone?  No   Are you deaf or do you have serious difficulty hearing?  No   Do you need help with transportation? No   Do you need help shopping? No   Do you need help preparing meals?  No   Do you need help with housework?  No   Do you need help with laundry? No   Do you need help taking  your medications? No   Do you need help managing money? No   Do you ever drive or ride in a car without wearing a seat belt? No   Have you felt unusual stress, anger or loneliness in the last month? No   Who do you live with? Spouse   If you need help, do you have trouble finding someone available to you? No   Have you been bothered in the last four weeks by sexual problems? No   Do you have difficulty concentrating, remembering or making decisions? No       Age-appropriate Screening Schedule:  Refer to the list below for future screening recommendations based on patient's age, sex and/or medical conditions. Orders for these recommended tests are listed in the plan section. The patient has been provided with a written plan.    Health Maintenance   Topic Date Due   • MAMMOGRAM  Never done   • DXA SCAN  Never done   • ZOSTER VACCINE (1 of 2) Never done   • TDAP/TD VACCINES (2 - Tdap) 02/07/2012   • INFLUENZA VACCINE  08/01/2021              Assessment/Plan   CMS Preventative Services Quick Reference  Risk Factors Identified During Encounter  Immunizations Discussed/Encouraged (specific Immunizations; Influenza and COVID19  The above risks/problems have been discussed with the patient.  Follow up actions/plans if indicated are seen below in the Assessment/Plan Section.  Pertinent information has been shared with the patient in the After Visit Summary.    Diagnoses and all orders for this visit:    1. Annual physical exam (Primary)  Assessment & Plan:  She is reportedly UTD on colonoscopy, last done at [facility] she thinks less than 5 years ago; requesting records.  Pap smears no longer indicated by age and history.  She is reportedly UTD on mammogram, last done Nov 2020/Jan 2021 at [place], sometime over the winter, and that was normal; requesting records.  She is reportedly UTD on DEXA, last done within the past 2 years at [place]; requesting records.  She is on Prolia for osteoporosis.  She is  up-to-date on Covid (Moderna), Pneumovax (1/2021) and Prevnar (1/2020).  She is due for Shingrix, Td and high-dose flu.  Unable to give high-dose flu today as we are out, so I have encouraged her to get this at the pharmacy.  She is due for routine labs including CMP, lipids and hep C; ordered.      2. Essential hypertension  -     Comprehensive Metabolic Panel; Future  -     Lipid Panel; Future    3. Rheumatoid arthritis involving both hands with positive rheumatoid factor (HCC)    4. Generalized anxiety disorder  -     busPIRone (BUSPAR) 5 MG tablet; Take 1 tablet by mouth 2 (Two) Times a Day As Needed (anxiety).  Dispense: 60 tablet; Refill: 5    5. Encounter for screening for other viral diseases  -     Hepatitis C Antibody; Future    6. Bilateral impacted cerumen  -     Ear Cerumen Removal      Follow Up:   Return in about 6 months (around 4/21/2022) for Recheck.     An After Visit Summary and PPPS were made available to the patient.

## 2021-10-21 NOTE — ASSESSMENT & PLAN NOTE
She is reportedly UTD on colonoscopy, last done at [facility] she thinks less than 5 years ago; requesting records.  Pap smears no longer indicated by age and history.  She is reportedly UTD on mammogram, last done Nov 2020/Jan 2021 at [place], sometime over the winter, and that was normal; requesting records.  She is reportedly UTD on DEXA, last done within the past 2 years at [place]; requesting records.  She is on Prolia for osteoporosis.  She is up-to-date on Covid (Moderna), Pneumovax (1/2021) and Prevnar (1/2020).  She is due for Shingrix, Td and high-dose flu.  Unable to give high-dose flu today as we are out, so I have encouraged her to get this at the pharmacy.  She is due for routine labs including CMP, lipids and hep C; ordered.

## 2021-11-09 RX ORDER — DULOXETIN HYDROCHLORIDE 60 MG/1
CAPSULE, DELAYED RELEASE ORAL
Qty: 90 CAPSULE | Refills: 1 | Status: SHIPPED | OUTPATIENT
Start: 2021-11-09 | End: 2021-12-08

## 2021-11-16 ENCOUNTER — CLINICAL SUPPORT (OUTPATIENT)
Dept: FAMILY MEDICINE CLINIC | Age: 66
End: 2021-11-16

## 2021-11-16 DIAGNOSIS — M81.0 AGE-RELATED OSTEOPOROSIS WITHOUT CURRENT PATHOLOGICAL FRACTURE: Primary | ICD-10-CM

## 2021-11-16 PROCEDURE — 96372 THER/PROPH/DIAG INJ SC/IM: CPT | Performed by: FAMILY MEDICINE

## 2021-12-02 ENCOUNTER — LAB (OUTPATIENT)
Dept: LAB | Facility: HOSPITAL | Age: 66
End: 2021-12-02

## 2021-12-02 ENCOUNTER — TRANSCRIBE ORDERS (OUTPATIENT)
Dept: ADMINISTRATIVE | Facility: HOSPITAL | Age: 66
End: 2021-12-02

## 2021-12-02 DIAGNOSIS — M06.9 RHEUMATOID ARTHRITIS, INVOLVING UNSPECIFIED SITE, UNSPECIFIED WHETHER RHEUMATOID FACTOR PRESENT (HCC): Primary | ICD-10-CM

## 2021-12-02 DIAGNOSIS — Z76.89 PERSONS ENCOUNTERING HEALTH SERVICES IN OTHER SPECIFIED CIRCUMSTANCES: ICD-10-CM

## 2021-12-02 DIAGNOSIS — Z76.89 ENCOUNTER FOR PERSON ENCOUNTERING HEALTH SERVICES: ICD-10-CM

## 2021-12-02 DIAGNOSIS — M06.9 RHEUMATOID ARTHRITIS, INVOLVING UNSPECIFIED SITE, UNSPECIFIED WHETHER RHEUMATOID FACTOR PRESENT (HCC): ICD-10-CM

## 2021-12-02 LAB
ALBUMIN SERPL-MCNC: 4.3 G/DL (ref 3.5–5.2)
ALP SERPL-CCNC: 44 U/L (ref 39–117)
ALT SERPL W P-5'-P-CCNC: 16 U/L (ref 1–33)
AST SERPL-CCNC: 19 U/L (ref 1–32)
BASOPHILS # BLD AUTO: 0.08 10*3/MM3 (ref 0–0.2)
BASOPHILS NFR BLD AUTO: 1.3 % (ref 0–1.5)
BILIRUB CONJ SERPL-MCNC: <0.2 MG/DL (ref 0–0.3)
BILIRUB INDIRECT SERPL-MCNC: NORMAL MG/DL
BILIRUB SERPL-MCNC: 0.2 MG/DL (ref 0–1.2)
CREAT SERPL-MCNC: 0.87 MG/DL (ref 0.57–1)
CRP SERPL-MCNC: <0.3 MG/DL (ref 0–0.5)
DEPRECATED RDW RBC AUTO: 44.3 FL (ref 37–54)
EOSINOPHIL # BLD AUTO: 0.11 10*3/MM3 (ref 0–0.4)
EOSINOPHIL NFR BLD AUTO: 1.8 % (ref 0.3–6.2)
ERYTHROCYTE [DISTWIDTH] IN BLOOD BY AUTOMATED COUNT: 14.1 % (ref 12.3–15.4)
ERYTHROCYTE [SEDIMENTATION RATE] IN BLOOD: 2 MM/HR (ref 0–30)
GFR SERPL CREATININE-BSD FRML MDRD: 65 ML/MIN/1.73
HCT VFR BLD AUTO: 38.1 % (ref 34–46.6)
HGB BLD-MCNC: 12.6 G/DL (ref 12–15.9)
IMM GRANULOCYTES # BLD AUTO: 0.01 10*3/MM3 (ref 0–0.05)
IMM GRANULOCYTES NFR BLD AUTO: 0.2 % (ref 0–0.5)
LYMPHOCYTES # BLD AUTO: 1.38 10*3/MM3 (ref 0.7–3.1)
LYMPHOCYTES NFR BLD AUTO: 22.1 % (ref 19.6–45.3)
MCH RBC QN AUTO: 29.2 PG (ref 26.6–33)
MCHC RBC AUTO-ENTMCNC: 33.1 G/DL (ref 31.5–35.7)
MCV RBC AUTO: 88.4 FL (ref 79–97)
MONOCYTES # BLD AUTO: 0.59 10*3/MM3 (ref 0.1–0.9)
MONOCYTES NFR BLD AUTO: 9.5 % (ref 5–12)
NEUTROPHILS NFR BLD AUTO: 4.07 10*3/MM3 (ref 1.7–7)
NEUTROPHILS NFR BLD AUTO: 65.1 % (ref 42.7–76)
PLATELET # BLD AUTO: 314 10*3/MM3 (ref 140–450)
PMV BLD AUTO: 9.3 FL (ref 6–12)
PROT SERPL-MCNC: 7 G/DL (ref 6–8.5)
RBC # BLD AUTO: 4.31 10*6/MM3 (ref 3.77–5.28)
WBC NRBC COR # BLD: 6.24 10*3/MM3 (ref 3.4–10.8)

## 2021-12-02 PROCEDURE — 85652 RBC SED RATE AUTOMATED: CPT

## 2021-12-02 PROCEDURE — 82565 ASSAY OF CREATININE: CPT

## 2021-12-02 PROCEDURE — 80076 HEPATIC FUNCTION PANEL: CPT

## 2021-12-02 PROCEDURE — 86140 C-REACTIVE PROTEIN: CPT

## 2021-12-02 PROCEDURE — 85025 COMPLETE CBC W/AUTO DIFF WBC: CPT

## 2021-12-02 PROCEDURE — 36415 COLL VENOUS BLD VENIPUNCTURE: CPT

## 2021-12-08 RX ORDER — DULOXETIN HYDROCHLORIDE 60 MG/1
CAPSULE, DELAYED RELEASE ORAL
Qty: 90 CAPSULE | Refills: 1 | Status: SHIPPED | OUTPATIENT
Start: 2021-12-08 | End: 2022-06-30

## 2021-12-15 DIAGNOSIS — F41.1 GENERALIZED ANXIETY DISORDER: ICD-10-CM

## 2021-12-15 RX ORDER — BUSPIRONE HYDROCHLORIDE 5 MG/1
TABLET ORAL
Qty: 180 TABLET | Refills: 5 | OUTPATIENT
Start: 2021-12-15

## 2021-12-22 RX ORDER — ATENOLOL 25 MG/1
TABLET ORAL
Qty: 90 TABLET | Refills: 1 | Status: SHIPPED | OUTPATIENT
Start: 2021-12-22 | End: 2022-01-26 | Stop reason: SDUPTHER

## 2022-01-04 ENCOUNTER — LAB (OUTPATIENT)
Dept: LAB | Facility: HOSPITAL | Age: 67
End: 2022-01-04

## 2022-01-04 DIAGNOSIS — Z11.59 ENCOUNTER FOR SCREENING FOR OTHER VIRAL DISEASES: ICD-10-CM

## 2022-01-04 DIAGNOSIS — I10 ESSENTIAL HYPERTENSION: ICD-10-CM

## 2022-01-04 DIAGNOSIS — M06.9 RHEUMATOID ARTHRITIS, INVOLVING UNSPECIFIED SITE, UNSPECIFIED WHETHER RHEUMATOID FACTOR PRESENT: ICD-10-CM

## 2022-01-04 DIAGNOSIS — Z76.89 PERSONS ENCOUNTERING HEALTH SERVICES IN OTHER SPECIFIED CIRCUMSTANCES: ICD-10-CM

## 2022-01-04 DIAGNOSIS — I10 ESSENTIAL HYPERTENSION: Primary | ICD-10-CM

## 2022-01-04 LAB
ALBUMIN SERPL-MCNC: 4.3 G/DL (ref 3.5–5.2)
ALBUMIN/GLOB SERPL: 1.8 G/DL
ALP SERPL-CCNC: 49 U/L (ref 39–117)
ALT SERPL W P-5'-P-CCNC: 24 U/L (ref 1–33)
ANION GAP SERPL CALCULATED.3IONS-SCNC: 7.3 MMOL/L (ref 5–15)
AST SERPL-CCNC: 24 U/L (ref 1–32)
BASOPHILS # BLD AUTO: 0.07 10*3/MM3 (ref 0–0.2)
BASOPHILS NFR BLD AUTO: 1.2 % (ref 0–1.5)
BILIRUB CONJ SERPL-MCNC: <0.2 MG/DL (ref 0–0.3)
BILIRUB SERPL-MCNC: 0.2 MG/DL (ref 0–1.2)
BUN SERPL-MCNC: 16 MG/DL (ref 8–23)
BUN/CREAT SERPL: 19.3 (ref 7–25)
CALCIUM SPEC-SCNC: 9.2 MG/DL (ref 8.6–10.5)
CHLORIDE SERPL-SCNC: 106 MMOL/L (ref 98–107)
CHOLEST SERPL-MCNC: 208 MG/DL (ref 0–200)
CO2 SERPL-SCNC: 26.7 MMOL/L (ref 22–29)
CREAT SERPL-MCNC: 0.83 MG/DL (ref 0.57–1)
CRP SERPL-MCNC: <0.3 MG/DL (ref 0–0.5)
DEPRECATED RDW RBC AUTO: 48.2 FL (ref 37–54)
EOSINOPHIL # BLD AUTO: 0.15 10*3/MM3 (ref 0–0.4)
EOSINOPHIL NFR BLD AUTO: 2.6 % (ref 0.3–6.2)
ERYTHROCYTE [DISTWIDTH] IN BLOOD BY AUTOMATED COUNT: 14.8 % (ref 12.3–15.4)
ERYTHROCYTE [SEDIMENTATION RATE] IN BLOOD: 6 MM/HR (ref 0–30)
GFR SERPL CREATININE-BSD FRML MDRD: 69 ML/MIN/1.73
GLOBULIN UR ELPH-MCNC: 2.4 GM/DL
GLUCOSE SERPL-MCNC: 94 MG/DL (ref 65–99)
HCT VFR BLD AUTO: 37.9 % (ref 34–46.6)
HCV AB SER DONR QL: NORMAL
HDLC SERPL-MCNC: 56 MG/DL (ref 40–60)
HGB BLD-MCNC: 12.4 G/DL (ref 12–15.9)
IMM GRANULOCYTES # BLD AUTO: 0.03 10*3/MM3 (ref 0–0.05)
IMM GRANULOCYTES NFR BLD AUTO: 0.5 % (ref 0–0.5)
LDLC SERPL CALC-MCNC: 134 MG/DL (ref 0–100)
LDLC/HDLC SERPL: 2.35 {RATIO}
LYMPHOCYTES # BLD AUTO: 1.63 10*3/MM3 (ref 0.7–3.1)
LYMPHOCYTES NFR BLD AUTO: 28.6 % (ref 19.6–45.3)
MCH RBC QN AUTO: 29.5 PG (ref 26.6–33)
MCHC RBC AUTO-ENTMCNC: 32.7 G/DL (ref 31.5–35.7)
MCV RBC AUTO: 90.2 FL (ref 79–97)
MONOCYTES # BLD AUTO: 0.53 10*3/MM3 (ref 0.1–0.9)
MONOCYTES NFR BLD AUTO: 9.3 % (ref 5–12)
NEUTROPHILS NFR BLD AUTO: 3.28 10*3/MM3 (ref 1.7–7)
NEUTROPHILS NFR BLD AUTO: 57.8 % (ref 42.7–76)
PLATELET # BLD AUTO: 298 10*3/MM3 (ref 140–450)
PMV BLD AUTO: 9.2 FL (ref 6–12)
POTASSIUM SERPL-SCNC: 3.8 MMOL/L (ref 3.5–5.2)
PROT SERPL-MCNC: 6.7 G/DL (ref 6–8.5)
RBC # BLD AUTO: 4.2 10*6/MM3 (ref 3.77–5.28)
SODIUM SERPL-SCNC: 140 MMOL/L (ref 136–145)
TRIGL SERPL-MCNC: 101 MG/DL (ref 0–150)
VLDLC SERPL-MCNC: 18 MG/DL (ref 5–40)
WBC NRBC COR # BLD: 5.69 10*3/MM3 (ref 3.4–10.8)

## 2022-01-04 PROCEDURE — 86140 C-REACTIVE PROTEIN: CPT

## 2022-01-04 PROCEDURE — 82248 BILIRUBIN DIRECT: CPT

## 2022-01-04 PROCEDURE — 85025 COMPLETE CBC W/AUTO DIFF WBC: CPT

## 2022-01-04 PROCEDURE — 36415 COLL VENOUS BLD VENIPUNCTURE: CPT

## 2022-01-04 PROCEDURE — 80061 LIPID PANEL: CPT

## 2022-01-04 PROCEDURE — 85652 RBC SED RATE AUTOMATED: CPT

## 2022-01-04 PROCEDURE — 86803 HEPATITIS C AB TEST: CPT

## 2022-01-04 PROCEDURE — 80053 COMPREHEN METABOLIC PANEL: CPT

## 2022-01-26 ENCOUNTER — OFFICE VISIT (OUTPATIENT)
Dept: FAMILY MEDICINE CLINIC | Age: 67
End: 2022-01-26

## 2022-01-26 VITALS
HEART RATE: 86 BPM | TEMPERATURE: 98.4 F | BODY MASS INDEX: 22.78 KG/M2 | HEIGHT: 59 IN | SYSTOLIC BLOOD PRESSURE: 126 MMHG | DIASTOLIC BLOOD PRESSURE: 72 MMHG | WEIGHT: 113 LBS

## 2022-01-26 DIAGNOSIS — F41.1 GENERALIZED ANXIETY DISORDER: ICD-10-CM

## 2022-01-26 DIAGNOSIS — K21.9 GASTROESOPHAGEAL REFLUX DISEASE WITHOUT ESOPHAGITIS: ICD-10-CM

## 2022-01-26 DIAGNOSIS — M05.741 RHEUMATOID ARTHRITIS INVOLVING BOTH HANDS WITH POSITIVE RHEUMATOID FACTOR: ICD-10-CM

## 2022-01-26 DIAGNOSIS — M81.0 AGE-RELATED OSTEOPOROSIS WITHOUT CURRENT PATHOLOGICAL FRACTURE: ICD-10-CM

## 2022-01-26 DIAGNOSIS — J30.1 SEASONAL ALLERGIC RHINITIS DUE TO POLLEN: ICD-10-CM

## 2022-01-26 DIAGNOSIS — I10 ESSENTIAL HYPERTENSION: Primary | ICD-10-CM

## 2022-01-26 DIAGNOSIS — M05.742 RHEUMATOID ARTHRITIS INVOLVING BOTH HANDS WITH POSITIVE RHEUMATOID FACTOR: ICD-10-CM

## 2022-01-26 DIAGNOSIS — F33.1 MODERATE EPISODE OF RECURRENT MAJOR DEPRESSIVE DISORDER: ICD-10-CM

## 2022-01-26 PROBLEM — Z00.00 ANNUAL PHYSICAL EXAM: Status: RESOLVED | Noted: 2021-10-21 | Resolved: 2022-01-26

## 2022-01-26 PROCEDURE — 99214 OFFICE O/P EST MOD 30 MIN: CPT | Performed by: FAMILY MEDICINE

## 2022-01-26 RX ORDER — BUSPIRONE HYDROCHLORIDE 5 MG/1
5 TABLET ORAL 2 TIMES DAILY PRN
Qty: 180 TABLET | Refills: 1 | Status: SHIPPED | OUTPATIENT
Start: 2022-01-26 | End: 2022-07-28 | Stop reason: SDUPTHER

## 2022-01-26 RX ORDER — METHOTREXATE 2.5 MG/1
8 TABLET ORAL WEEKLY
COMMUNITY
Start: 2021-12-27

## 2022-01-26 RX ORDER — ATENOLOL 25 MG/1
50 TABLET ORAL DAILY
Qty: 90 TABLET | Refills: 1 | Status: SHIPPED | OUTPATIENT
Start: 2022-01-26 | End: 2022-05-23

## 2022-01-26 RX ORDER — FOLIC ACID 1 MG/1
1000 TABLET ORAL DAILY
COMMUNITY
Start: 2021-12-27

## 2022-01-26 RX ORDER — MONTELUKAST SODIUM 10 MG/1
10 TABLET ORAL NIGHTLY
Qty: 90 TABLET | Refills: 3 | Status: SHIPPED | OUTPATIENT
Start: 2022-01-26 | End: 2022-10-13

## 2022-01-26 NOTE — PROGRESS NOTES
"Chief Complaint  Hypertension    Subjective          Jaqueline Katz presents to [facility] today for follow-up on routine issues.    She is on atenolol and losartan for hypertension.  Her blood pressure has been running higher for the past 2 weeks.  She denies chest pain, palpitations or shortness of air.  She has had a lot of sinus headaches and a couple of episodes where she felt just a little bit dizzy.  BP has been running 135-179/.    She is on Cymbalta for mild depression with buspirone for anxiety.  She really likes the effect of the buspirone.  \"I just feel like myself.\"  She started having more problems with anxiety and depression when she stopped smoking (back in 2013).   She has been on Xanax in the past but stopped it because she was afraid of getting addicted.  We also tried her on hydroxyzine in the past, but that was overly sedating.    She is following with Rheumatology and they have her on methotrexate for rheumatoid arthritis.    She is on Nexium for heartburn.    She has osteoporosis in the hip on her last DEXA.  She is on Prolia.        Current Outpatient Medications:   •  atenolol (TENORMIN) 25 MG tablet, Take 2 tablets by mouth Daily., Disp: 90 tablet, Rfl: 1  •  busPIRone (BUSPAR) 5 MG tablet, Take 1 tablet by mouth 2 (Two) Times a Day As Needed (anxiety)., Disp: 180 tablet, Rfl: 1  •  denosumab (Prolia) 60 MG/ML solution prefilled syringe syringe, 1 mL Every 6 (Six) Months., Disp: , Rfl:   •  DULoxetine (CYMBALTA) 60 MG capsule, TAKE ONE CAPSULE BY MOUTH EVERY DAY, Disp: 90 capsule, Rfl: 1  •  esomeprazole (nexIUM) 40 MG capsule, TAKE ONE CAPSULE BY MOUTH EVERY DAY, Disp: 90 capsule, Rfl: 1  •  folic acid (FOLVITE) 1 MG tablet, Take 1,000 mcg by mouth Daily., Disp: , Rfl:   •  losartan (COZAAR) 100 MG tablet, Take 1 tablet by mouth Daily., Disp: 90 tablet, Rfl: 1  •  methotrexate 2.5 MG tablet, Take 6 tablets by mouth 1 (One) Time Per Week., Disp: , Rfl: " "  •  olopatadine (Patanol) 0.1 % ophthalmic solution, Patanol 0.1 % eye drops  INSTILL 1 DROP INTO AFFECTED EYE(S) BY OPHTHALMIC ROUTE 2 TIMES PER DAY AT AN INTERVAL OF 6 TO 8 HOURS, Disp: , Rfl:   •  montelukast (Singulair) 10 MG tablet, Take 1 tablet by mouth Every Night., Disp: 90 tablet, Rfl: 3    Current Facility-Administered Medications:   •  denosumab (PROLIA) syringe 60 mg, 60 mg, Subcutaneous, Q6 Months, Rosalinda Irizarry MD, 60 mg at 11/16/21 1123    Allergies:  Penicillins and Nsaids      Objective   Vital Signs:   /72 (BP Location: Right arm, Patient Position: Sitting)   Pulse 86   Temp 98.4 °F (36.9 °C) (Oral)   Ht 149.9 cm (59.02\")   Wt 51.3 kg (113 lb)   BMI 22.81 kg/m²     Physical Exam  Vitals reviewed.   Constitutional:       General: She is not in acute distress.     Appearance: Normal appearance. She is well-developed.   HENT:      Head: Normocephalic and atraumatic.      Right Ear: External ear normal.      Left Ear: External ear normal.      Nose: Nose normal.      Mouth/Throat:      Mouth: Mucous membranes are moist.   Eyes:      Extraocular Movements: Extraocular movements intact.      Conjunctiva/sclera: Conjunctivae normal.      Pupils: Pupils are equal, round, and reactive to light.   Cardiovascular:      Rate and Rhythm: Normal rate and regular rhythm.      Heart sounds: No murmur heard.      Pulmonary:      Effort: Pulmonary effort is normal.      Breath sounds: Normal breath sounds. No wheezing, rhonchi or rales.   Abdominal:      General: Bowel sounds are normal. There is no distension.      Palpations: Abdomen is soft.      Tenderness: There is no abdominal tenderness.   Musculoskeletal:         General: Normal range of motion.   Neurological:      Mental Status: She is alert.   Psychiatric:         Mood and Affect: Affect normal.             Assessment and Plan    Diagnoses and all orders for this visit:    1. Essential hypertension (Primary)  Assessment & " Plan:  Increase atenolol from 25 mg to 50 mg.  Continue losartan at 100 mg.  Refills sent.  Labs are reviewed and UTD.    Orders:  -     atenolol (TENORMIN) 25 MG tablet; Take 2 tablets by mouth Daily.  Dispense: 90 tablet; Refill: 1    2. Generalized anxiety disorder  -     busPIRone (BUSPAR) 5 MG tablet; Take 1 tablet by mouth 2 (Two) Times a Day As Needed (anxiety).  Dispense: 180 tablet; Refill: 1    3. Moderate episode of recurrent major depressive disorder (HCC)  Assessment & Plan:  Stable.  No refills needed.        4. Gastroesophageal reflux disease without esophagitis  Assessment & Plan:  Stable on Nexium.  No refills needed.      5. Rheumatoid arthritis involving both hands with positive rheumatoid factor (HCC)  Assessment & Plan:  Following with Rheumatology.  On methotrexate.      6. Age-related osteoporosis without current pathological fracture    7. Seasonal allergic rhinitis due to pollen  Assessment & Plan:  Adding Singulair to her daily regimen.  If she is still not having any improvement by her next visit (in 3 mos) then we will plan to send her to Allergy for allergy testing.  She is already using OTC antihistamines and Flonase.      Orders:  -     montelukast (Singulair) 10 MG tablet; Take 1 tablet by mouth Every Night.  Dispense: 90 tablet; Refill: 3      Follow Up   Return for Recheck as scheduled 4/20/22.  Patient was given instructions and counseling regarding her condition or for health maintenance advice. Please see specific information pulled into the AVS if appropriate.

## 2022-01-26 NOTE — ASSESSMENT & PLAN NOTE
Increase atenolol from 25 mg to 50 mg.  Continue losartan at 100 mg.  Refills sent.  Labs are reviewed and UTD.

## 2022-01-26 NOTE — ASSESSMENT & PLAN NOTE
Adding Singulair to her daily regimen.  If she is still not having any improvement by her next visit (in 3 mos) then we will plan to send her to Allergy for allergy testing.  She is already using OTC antihistamines and Flonase.

## 2022-02-07 ENCOUNTER — LAB (OUTPATIENT)
Dept: LAB | Facility: HOSPITAL | Age: 67
End: 2022-02-07

## 2022-02-07 DIAGNOSIS — M06.9 RHEUMATOID ARTHRITIS, INVOLVING UNSPECIFIED SITE, UNSPECIFIED WHETHER RHEUMATOID FACTOR PRESENT: ICD-10-CM

## 2022-02-07 DIAGNOSIS — Z76.89 PERSONS ENCOUNTERING HEALTH SERVICES IN OTHER SPECIFIED CIRCUMSTANCES: ICD-10-CM

## 2022-02-07 LAB
ALBUMIN SERPL-MCNC: 4 G/DL (ref 3.5–5.2)
ALP SERPL-CCNC: 45 U/L (ref 39–117)
ALT SERPL W P-5'-P-CCNC: 19 U/L (ref 1–33)
AST SERPL-CCNC: 17 U/L (ref 1–32)
BASOPHILS # BLD AUTO: 0.1 10*3/MM3 (ref 0–0.2)
BASOPHILS NFR BLD AUTO: 1.3 % (ref 0–1.5)
BILIRUB CONJ SERPL-MCNC: <0.2 MG/DL (ref 0–0.3)
BILIRUB INDIRECT SERPL-MCNC: NORMAL MG/DL
BILIRUB SERPL-MCNC: 0.2 MG/DL (ref 0–1.2)
CREAT SERPL-MCNC: 0.95 MG/DL (ref 0.57–1)
DEPRECATED RDW RBC AUTO: 49.8 FL (ref 37–54)
EOSINOPHIL # BLD AUTO: 0.13 10*3/MM3 (ref 0–0.4)
EOSINOPHIL NFR BLD AUTO: 1.7 % (ref 0.3–6.2)
ERYTHROCYTE [DISTWIDTH] IN BLOOD BY AUTOMATED COUNT: 14.6 % (ref 12.3–15.4)
ERYTHROCYTE [SEDIMENTATION RATE] IN BLOOD: 1 MM/HR (ref 0–30)
GFR SERPL CREATININE-BSD FRML MDRD: 59 ML/MIN/1.73
HCT VFR BLD AUTO: 39.3 % (ref 34–46.6)
HGB BLD-MCNC: 12.9 G/DL (ref 12–15.9)
IMM GRANULOCYTES # BLD AUTO: 0.01 10*3/MM3 (ref 0–0.05)
IMM GRANULOCYTES NFR BLD AUTO: 0.1 % (ref 0–0.5)
LYMPHOCYTES # BLD AUTO: 1.98 10*3/MM3 (ref 0.7–3.1)
LYMPHOCYTES NFR BLD AUTO: 25.4 % (ref 19.6–45.3)
MCH RBC QN AUTO: 30.3 PG (ref 26.6–33)
MCHC RBC AUTO-ENTMCNC: 32.8 G/DL (ref 31.5–35.7)
MCV RBC AUTO: 92.3 FL (ref 79–97)
MONOCYTES # BLD AUTO: 0.62 10*3/MM3 (ref 0.1–0.9)
MONOCYTES NFR BLD AUTO: 8 % (ref 5–12)
NEUTROPHILS NFR BLD AUTO: 4.94 10*3/MM3 (ref 1.7–7)
NEUTROPHILS NFR BLD AUTO: 63.5 % (ref 42.7–76)
PLATELET # BLD AUTO: 292 10*3/MM3 (ref 140–450)
PMV BLD AUTO: 9.3 FL (ref 6–12)
PROT SERPL-MCNC: 6.5 G/DL (ref 6–8.5)
RBC # BLD AUTO: 4.26 10*6/MM3 (ref 3.77–5.28)
WBC NRBC COR # BLD: 7.78 10*3/MM3 (ref 3.4–10.8)

## 2022-02-07 PROCEDURE — 85652 RBC SED RATE AUTOMATED: CPT

## 2022-02-07 PROCEDURE — 86140 C-REACTIVE PROTEIN: CPT

## 2022-02-07 PROCEDURE — 82565 ASSAY OF CREATININE: CPT

## 2022-02-07 PROCEDURE — 85025 COMPLETE CBC W/AUTO DIFF WBC: CPT

## 2022-02-07 PROCEDURE — 36415 COLL VENOUS BLD VENIPUNCTURE: CPT

## 2022-02-07 PROCEDURE — 80076 HEPATIC FUNCTION PANEL: CPT

## 2022-02-08 LAB — CRP SERPL-MCNC: <0.3 MG/DL (ref 0–0.5)

## 2022-02-23 DIAGNOSIS — I10 ESSENTIAL HYPERTENSION: ICD-10-CM

## 2022-02-23 RX ORDER — ATENOLOL 25 MG/1
TABLET ORAL
Qty: 90 TABLET | Refills: 1 | OUTPATIENT
Start: 2022-02-23

## 2022-03-10 ENCOUNTER — LAB (OUTPATIENT)
Dept: LAB | Facility: HOSPITAL | Age: 67
End: 2022-03-10

## 2022-03-10 ENCOUNTER — TELEPHONE (OUTPATIENT)
Dept: FAMILY MEDICINE CLINIC | Age: 67
End: 2022-03-10

## 2022-03-10 DIAGNOSIS — Z87.891 PERSONAL HISTORY OF TOBACCO USE, PRESENTING HAZARDS TO HEALTH: Primary | ICD-10-CM

## 2022-03-10 DIAGNOSIS — M06.9 RHEUMATOID ARTHRITIS, INVOLVING UNSPECIFIED SITE, UNSPECIFIED WHETHER RHEUMATOID FACTOR PRESENT: ICD-10-CM

## 2022-03-10 DIAGNOSIS — Z76.89 PERSONS ENCOUNTERING HEALTH SERVICES IN OTHER SPECIFIED CIRCUMSTANCES: ICD-10-CM

## 2022-03-10 LAB
BASOPHILS # BLD AUTO: 0.08 10*3/MM3 (ref 0–0.2)
BASOPHILS NFR BLD AUTO: 1 % (ref 0–1.5)
DEPRECATED RDW RBC AUTO: 46.9 FL (ref 37–54)
EOSINOPHIL # BLD AUTO: 0.12 10*3/MM3 (ref 0–0.4)
EOSINOPHIL NFR BLD AUTO: 1.5 % (ref 0.3–6.2)
ERYTHROCYTE [DISTWIDTH] IN BLOOD BY AUTOMATED COUNT: 13.9 % (ref 12.3–15.4)
ERYTHROCYTE [SEDIMENTATION RATE] IN BLOOD: 6 MM/HR (ref 0–30)
HCT VFR BLD AUTO: 39 % (ref 34–46.6)
HGB BLD-MCNC: 12.4 G/DL (ref 12–15.9)
IMM GRANULOCYTES # BLD AUTO: 0.04 10*3/MM3 (ref 0–0.05)
IMM GRANULOCYTES NFR BLD AUTO: 0.5 % (ref 0–0.5)
LYMPHOCYTES # BLD AUTO: 2.23 10*3/MM3 (ref 0.7–3.1)
LYMPHOCYTES NFR BLD AUTO: 27.3 % (ref 19.6–45.3)
MCH RBC QN AUTO: 29.6 PG (ref 26.6–33)
MCHC RBC AUTO-ENTMCNC: 31.8 G/DL (ref 31.5–35.7)
MCV RBC AUTO: 93.1 FL (ref 79–97)
MONOCYTES # BLD AUTO: 0.67 10*3/MM3 (ref 0.1–0.9)
MONOCYTES NFR BLD AUTO: 8.2 % (ref 5–12)
NEUTROPHILS NFR BLD AUTO: 5.04 10*3/MM3 (ref 1.7–7)
NEUTROPHILS NFR BLD AUTO: 61.5 % (ref 42.7–76)
PLATELET # BLD AUTO: 315 10*3/MM3 (ref 140–450)
PMV BLD AUTO: 9.2 FL (ref 6–12)
RBC # BLD AUTO: 4.19 10*6/MM3 (ref 3.77–5.28)
WBC NRBC COR # BLD: 8.18 10*3/MM3 (ref 3.4–10.8)

## 2022-03-10 PROCEDURE — 80076 HEPATIC FUNCTION PANEL: CPT

## 2022-03-10 PROCEDURE — 82565 ASSAY OF CREATININE: CPT

## 2022-03-10 PROCEDURE — 85025 COMPLETE CBC W/AUTO DIFF WBC: CPT

## 2022-03-10 PROCEDURE — 85652 RBC SED RATE AUTOMATED: CPT

## 2022-03-10 PROCEDURE — 36415 COLL VENOUS BLD VENIPUNCTURE: CPT

## 2022-03-10 PROCEDURE — 86140 C-REACTIVE PROTEIN: CPT

## 2022-03-10 NOTE — TELEPHONE ENCOUNTER
Pt states she has a lung nodule that needs to be followed up on yearly and it is time to repeat the Chest CT.  This was found by her old MD and they have been calling her to remind her that it was due.  Last CT done at [place].  Records requested.  Please advise.

## 2022-03-10 NOTE — TELEPHONE ENCOUNTER
Will place order as soon as the CT report comes in from [place] -- I will need to know the size of the nodule we are tracking to be able to put in the dx code.  Thanks, JONATHAN

## 2022-03-10 NOTE — TELEPHONE ENCOUNTER
Caller: Jaqueline Katz    Relationship: Self    Best call back number: [phone]    What is the best time to reach you: ANYTIME     Who are you requesting to speak with (clinical staff, provider,  specific staff member): NURSE     What was the call regarding: PATIENT IS WANTING TO CHECK AND SEE IF YOU HAVE ANY PLANS TO DO A CT SCAN OF HER LUNGS.  [facility] IS CALLING STATING THAT SHE NEEDS TO HAVE ONE DONE.     Do you require a callback: YES

## 2022-03-11 LAB
ALBUMIN SERPL-MCNC: 4 G/DL (ref 3.5–5.2)
ALP SERPL-CCNC: 51 U/L (ref 39–117)
ALT SERPL W P-5'-P-CCNC: 9 U/L (ref 1–33)
AST SERPL-CCNC: 18 U/L (ref 1–32)
BILIRUB CONJ SERPL-MCNC: <0.2 MG/DL (ref 0–0.3)
BILIRUB INDIRECT SERPL-MCNC: NORMAL MG/DL
BILIRUB SERPL-MCNC: 0.2 MG/DL (ref 0–1.2)
CREAT SERPL-MCNC: 0.9 MG/DL (ref 0.57–1)
CRP SERPL-MCNC: <0.3 MG/DL (ref 0–0.5)
EGFRCR SERPLBLD CKD-EPI 2021: 70.2 ML/MIN/1.73
PROT SERPL-MCNC: 6.8 G/DL (ref 6–8.5)

## 2022-03-16 NOTE — TELEPHONE ENCOUNTER
Can you please f/u on the CT?  Or if we can't get the physical report, can someone down at [place] just read it off to us?  I just need to know the size of the pulmonary nodule.  Thanks, JONATHAN

## 2022-03-18 NOTE — TELEPHONE ENCOUNTER
Order placed for low dose CT chest lung cancer screening per recommendations on last CT.  Thanks, JONATHAN

## 2022-03-24 ENCOUNTER — HOSPITAL ENCOUNTER (OUTPATIENT)
Dept: CT IMAGING | Facility: HOSPITAL | Age: 67
Discharge: HOME | End: 2022-03-24
Admitting: FAMILY MEDICINE

## 2022-03-24 DIAGNOSIS — Z87.891 PERSONAL HISTORY OF TOBACCO USE, PRESENTING HAZARDS TO HEALTH: ICD-10-CM

## 2022-03-24 PROCEDURE — 71271 CT THORAX LUNG CANCER SCR C-: CPT

## 2022-04-20 ENCOUNTER — LAB (OUTPATIENT)
Dept: LAB | Facility: HOSPITAL | Age: 67
End: 2022-04-20

## 2022-04-20 ENCOUNTER — OFFICE VISIT (OUTPATIENT)
Dept: FAMILY MEDICINE CLINIC | Age: 67
End: 2022-04-20

## 2022-04-20 VITALS
HEART RATE: 58 BPM | TEMPERATURE: 97.6 F | WEIGHT: 114 LBS | DIASTOLIC BLOOD PRESSURE: 67 MMHG | BODY MASS INDEX: 22.98 KG/M2 | SYSTOLIC BLOOD PRESSURE: 150 MMHG | HEIGHT: 59 IN

## 2022-04-20 DIAGNOSIS — I10 ESSENTIAL HYPERTENSION: Primary | ICD-10-CM

## 2022-04-20 DIAGNOSIS — F41.1 GENERALIZED ANXIETY DISORDER: ICD-10-CM

## 2022-04-20 DIAGNOSIS — M81.0 AGE-RELATED OSTEOPOROSIS WITHOUT CURRENT PATHOLOGICAL FRACTURE: ICD-10-CM

## 2022-04-20 DIAGNOSIS — F33.1 MODERATE EPISODE OF RECURRENT MAJOR DEPRESSIVE DISORDER: ICD-10-CM

## 2022-04-20 DIAGNOSIS — I10 ESSENTIAL HYPERTENSION: ICD-10-CM

## 2022-04-20 DIAGNOSIS — M05.741 RHEUMATOID ARTHRITIS INVOLVING BOTH HANDS WITH POSITIVE RHEUMATOID FACTOR: ICD-10-CM

## 2022-04-20 DIAGNOSIS — Z76.89 PERSONS ENCOUNTERING HEALTH SERVICES IN OTHER SPECIFIED CIRCUMSTANCES: ICD-10-CM

## 2022-04-20 DIAGNOSIS — K21.9 GASTROESOPHAGEAL REFLUX DISEASE WITHOUT ESOPHAGITIS: ICD-10-CM

## 2022-04-20 DIAGNOSIS — R20.2 PARESTHESIA OF BOTH FEET: ICD-10-CM

## 2022-04-20 DIAGNOSIS — M05.742 RHEUMATOID ARTHRITIS INVOLVING BOTH HANDS WITH POSITIVE RHEUMATOID FACTOR: ICD-10-CM

## 2022-04-20 DIAGNOSIS — M06.9 RHEUMATOID ARTHRITIS, INVOLVING UNSPECIFIED SITE, UNSPECIFIED WHETHER RHEUMATOID FACTOR PRESENT: ICD-10-CM

## 2022-04-20 DIAGNOSIS — J01.00 ACUTE NON-RECURRENT MAXILLARY SINUSITIS: ICD-10-CM

## 2022-04-20 LAB
ALBUMIN SERPL-MCNC: 4.4 G/DL (ref 3.5–5.2)
ALP SERPL-CCNC: 45 U/L (ref 39–117)
ALT SERPL W P-5'-P-CCNC: 17 U/L (ref 1–33)
ANION GAP SERPL CALCULATED.3IONS-SCNC: 9.1 MMOL/L (ref 5–15)
AST SERPL-CCNC: 20 U/L (ref 1–32)
BASOPHILS # BLD AUTO: 0.05 10*3/MM3 (ref 0–0.2)
BASOPHILS NFR BLD AUTO: 1 % (ref 0–1.5)
BILIRUB CONJ SERPL-MCNC: <0.2 MG/DL (ref 0–0.3)
BILIRUB INDIRECT SERPL-MCNC: NORMAL MG/DL
BILIRUB SERPL-MCNC: 0.4 MG/DL (ref 0–1.2)
BUN SERPL-MCNC: 20 MG/DL (ref 8–23)
BUN/CREAT SERPL: 24.7 (ref 7–25)
CALCIUM SPEC-SCNC: 9 MG/DL (ref 8.6–10.5)
CHLORIDE SERPL-SCNC: 108 MMOL/L (ref 98–107)
CO2 SERPL-SCNC: 22.9 MMOL/L (ref 22–29)
CREAT SERPL-MCNC: 0.81 MG/DL (ref 0.57–1)
CRP SERPL-MCNC: <0.3 MG/DL (ref 0–0.5)
DEPRECATED RDW RBC AUTO: 48.4 FL (ref 37–54)
EGFRCR SERPLBLD CKD-EPI 2021: 79.7 ML/MIN/1.73
EOSINOPHIL # BLD AUTO: 0.06 10*3/MM3 (ref 0–0.4)
EOSINOPHIL NFR BLD AUTO: 1.2 % (ref 0.3–6.2)
ERYTHROCYTE [DISTWIDTH] IN BLOOD BY AUTOMATED COUNT: 14.1 % (ref 12.3–15.4)
ERYTHROCYTE [SEDIMENTATION RATE] IN BLOOD: 5 MM/HR (ref 0–30)
FOLATE SERPL-MCNC: >20 NG/ML (ref 4.78–24.2)
GLUCOSE SERPL-MCNC: 87 MG/DL (ref 65–99)
HCT VFR BLD AUTO: 38.4 % (ref 34–46.6)
HGB BLD-MCNC: 12.3 G/DL (ref 12–15.9)
IMM GRANULOCYTES # BLD AUTO: 0.01 10*3/MM3 (ref 0–0.05)
IMM GRANULOCYTES NFR BLD AUTO: 0.2 % (ref 0–0.5)
LYMPHOCYTES # BLD AUTO: 1.27 10*3/MM3 (ref 0.7–3.1)
LYMPHOCYTES NFR BLD AUTO: 25.1 % (ref 19.6–45.3)
MCH RBC QN AUTO: 30.3 PG (ref 26.6–33)
MCHC RBC AUTO-ENTMCNC: 32 G/DL (ref 31.5–35.7)
MCV RBC AUTO: 94.6 FL (ref 79–97)
MONOCYTES # BLD AUTO: 0.31 10*3/MM3 (ref 0.1–0.9)
MONOCYTES NFR BLD AUTO: 6.1 % (ref 5–12)
NEUTROPHILS NFR BLD AUTO: 3.35 10*3/MM3 (ref 1.7–7)
NEUTROPHILS NFR BLD AUTO: 66.4 % (ref 42.7–76)
PLATELET # BLD AUTO: 267 10*3/MM3 (ref 140–450)
PMV BLD AUTO: 9.4 FL (ref 6–12)
POTASSIUM SERPL-SCNC: 4.6 MMOL/L (ref 3.5–5.2)
PROT SERPL-MCNC: 6.8 G/DL (ref 6–8.5)
RBC # BLD AUTO: 4.06 10*6/MM3 (ref 3.77–5.28)
SODIUM SERPL-SCNC: 140 MMOL/L (ref 136–145)
VIT B12 BLD-MCNC: 859 PG/ML (ref 211–946)
WBC NRBC COR # BLD: 5.05 10*3/MM3 (ref 3.4–10.8)

## 2022-04-20 PROCEDURE — 80048 BASIC METABOLIC PNL TOTAL CA: CPT

## 2022-04-20 PROCEDURE — 85025 COMPLETE CBC W/AUTO DIFF WBC: CPT

## 2022-04-20 PROCEDURE — 82607 VITAMIN B-12: CPT

## 2022-04-20 PROCEDURE — 99214 OFFICE O/P EST MOD 30 MIN: CPT | Performed by: FAMILY MEDICINE

## 2022-04-20 PROCEDURE — 80076 HEPATIC FUNCTION PANEL: CPT

## 2022-04-20 PROCEDURE — 86140 C-REACTIVE PROTEIN: CPT

## 2022-04-20 PROCEDURE — 85652 RBC SED RATE AUTOMATED: CPT

## 2022-04-20 PROCEDURE — 82746 ASSAY OF FOLIC ACID SERUM: CPT

## 2022-04-20 PROCEDURE — 36415 COLL VENOUS BLD VENIPUNCTURE: CPT

## 2022-04-20 RX ORDER — CEFDINIR 300 MG/1
300 CAPSULE ORAL 2 TIMES DAILY
Qty: 20 CAPSULE | Refills: 0 | Status: SHIPPED | OUTPATIENT
Start: 2022-04-20 | End: 2022-04-30

## 2022-04-20 RX ORDER — MONTELUKAST SODIUM 10 MG/1
1 TABLET ORAL EVERY EVENING
COMMUNITY
End: 2022-04-20 | Stop reason: SDUPTHER

## 2022-04-20 RX ORDER — BUSPIRONE HYDROCHLORIDE 7.5 MG/1
1 TABLET ORAL DAILY
COMMUNITY
End: 2022-07-28

## 2022-04-20 NOTE — ASSESSMENT & PLAN NOTE
Blood pressure is well controlled on losartan 100 mg daily and atenolol 50 mg daily.  No refills needed.  Checking labs.

## 2022-04-20 NOTE — ASSESSMENT & PLAN NOTE
Sounds like flare started with her recent infection.  Increase Nexium to BID dosing for the next 10-14 days to see if we can get things calmed back down.

## 2022-04-20 NOTE — PROGRESS NOTES
"Chief Complaint  Hypertension (6 month follow up )    Subjective          Jaqueline Katz presents to [facility] today for routine f/u of chronic issues.    She had a \"stomach virus\" two weeks ago.  Her daughter told her she had COVID.  She notes that things \"still don't taste right.\"  +Congestion and rhinorrhea also persisting.  She has had some nausea.  She was feeling better initially after a few days, but then sx worsened again.    She is on losartan and atenolol for HTN.    Blood pressure has been looking better of late.  No problems with chest pain, palpitations or shortness of breath.     She is on duloxetine and buspirone for mild depression  and anxiety.  She has used Xanax (was afraid of addiction) and hydroxyzine (overly sedating) previously.     She is now on methotrexate for rheumatoid arthritis.  Following with Rheumatology.  She was referred to Neurology for paresthesias in the hands and feet.  They diagnosed her with carpal tunnel but she does not yet have a good answer to the foot numbness.     She is on Nexium for GERD.  Her reflux has been acting up a bit for the past 2 weeks since she started feeling sick.    She is on Prolia for osteoporosis.  DEXA is up-to-date by report.      Current Outpatient Medications:   •  atenolol (TENORMIN) 25 MG tablet, Take 2 tablets by mouth Daily., Disp: 90 tablet, Rfl: 1  •  denosumab (Prolia) 60 MG/ML solution prefilled syringe syringe, 1 mL Every 6 (Six) Months., Disp: , Rfl:   •  DULoxetine (CYMBALTA) 60 MG capsule, TAKE ONE CAPSULE BY MOUTH EVERY DAY, Disp: 90 capsule, Rfl: 1  •  esomeprazole (nexIUM) 40 MG capsule, TAKE ONE CAPSULE BY MOUTH EVERY DAY, Disp: 90 capsule, Rfl: 1  •  folic acid (FOLVITE) 1 MG tablet, Take 1,000 mcg by mouth Daily., Disp: , Rfl:   •  losartan (COZAAR) 100 MG tablet, Take 1 tablet by mouth Daily., Disp: 90 tablet, Rfl: 1  •  methotrexate 2.5 MG tablet, Take 8 tablets by mouth 1 (One) Time Per Week., " "Disp: , Rfl:   •  montelukast (Singulair) 10 MG tablet, Take 1 tablet by mouth Every Night., Disp: 90 tablet, Rfl: 3  •  olopatadine (PATANOL) 0.1 % ophthalmic solution, Patanol 0.1 % eye drops  INSTILL 1 DROP INTO AFFECTED EYE(S) BY OPHTHALMIC ROUTE 2 TIMES PER DAY AT AN INTERVAL OF 6 TO 8 HOURS, Disp: , Rfl:   •  busPIRone (BUSPAR) 5 MG tablet, Take 1 tablet by mouth 2 (Two) Times a Day As Needed (anxiety)., Disp: 180 tablet, Rfl: 1  •  busPIRone (BUSPAR) 7.5 MG tablet, Take 1 tablet by mouth Daily., Disp: , Rfl:   •  cefdinir (OMNICEF) 300 MG capsule, Take 1 capsule by mouth 2 (Two) Times a Day for 10 days., Disp: 20 capsule, Rfl: 0    Current Facility-Administered Medications:   •  denosumab (PROLIA) syringe 60 mg, 60 mg, Subcutaneous, Q6 Months, Rosalinda Irizarry MD, 60 mg at 11/16/21 1123    Allergies:  Penicillins and Nsaids      Objective   Vital Signs:   Vitals:    04/20/22 0908 04/20/22 0932   BP: 146/71 150/67   BP Location: Left arm Left arm   Patient Position: Sitting Sitting   Pulse: 58 58   Temp: 97.6 °F (36.4 °C)    TempSrc: Oral    Weight: 51.7 kg (114 lb)    Height: 149.9 cm (59\")        Physical Exam  Vitals reviewed.   Constitutional:       General: She is not in acute distress.     Appearance: Normal appearance.   HENT:      Right Ear: Tympanic membrane, ear canal and external ear normal.      Left Ear: Tympanic membrane, ear canal and external ear normal.      Nose: Congestion and rhinorrhea present.      Right Turbinates: Swollen and pale.      Left Turbinates: Swollen and pale.      Right Sinus: Frontal sinus tenderness present.      Left Sinus: Maxillary sinus tenderness and frontal sinus tenderness present.      Mouth/Throat:      Mouth: Mucous membranes are moist.      Pharynx: Posterior oropharyngeal erythema present.      Tonsils: No tonsillar exudate.   Eyes:      Extraocular Movements: Extraocular movements intact.      Pupils: Pupils are equal, round, and reactive to light. "   Cardiovascular:      Rate and Rhythm: Normal rate and regular rhythm.      Heart sounds: No murmur heard.  Pulmonary:      Effort: Pulmonary effort is normal. No respiratory distress.      Breath sounds: Normal breath sounds. No wheezing, rhonchi or rales.   Abdominal:      General: Bowel sounds are normal.      Tenderness: There is no abdominal tenderness.   Musculoskeletal:         General: Normal range of motion.   Neurological:      Mental Status: She is alert.             Assessment and Plan    Diagnoses and all orders for this visit:    1. Essential hypertension (Primary)  Assessment & Plan:  Blood pressure is well controlled on losartan 100 mg daily and atenolol 50 mg daily.  No refills needed.  Checking labs.    Orders:  -     Basic Metabolic Panel; Future    2. Moderate episode of recurrent major depressive disorder (HCC)  Assessment & Plan:  Stable on duloxetine 60 mg daily and buspirone.  No refills needed today.  Continue to monitor.      3. Generalized anxiety disorder  Assessment & Plan:  As per depression plan.      4. Rheumatoid arthritis involving both hands with positive rheumatoid factor (HCC)  Assessment & Plan:  Following with Rheumatology.  On methotrexate and folic acid.      5. Gastroesophageal reflux disease without esophagitis  Assessment & Plan:  Sounds like flare started with her recent infection.  Increase Nexium to BID dosing for the next 10-14 days to see if we can get things calmed back down.      6. Age-related osteoporosis without current pathological fracture  Assessment & Plan:  Stable on Prolia.  She is due for repeat DEXA.  Order placed.    Orders:  -     DEXA Bone Density Axial; Future    7. Paresthesia of both feet  -     Vitamin B12 & Folate; Future    8. Acute non-recurrent maxillary sinusitis  -     cefdinir (OMNICEF) 300 MG capsule; Take 1 capsule by mouth 2 (Two) Times a Day for 10 days.  Dispense: 20 capsule; Refill: 0      Follow Up   Return in about 3 months (around  7/20/2022) for Recheck AND Medicare Wellness in 6 months.  Patient was given instructions and counseling regarding her condition or for health maintenance advice. Please see specific information pulled into the AVS if appropriate.

## 2022-04-25 DIAGNOSIS — K21.9 GASTROESOPHAGEAL REFLUX DISEASE WITHOUT ESOPHAGITIS: ICD-10-CM

## 2022-04-26 RX ORDER — ESOMEPRAZOLE MAGNESIUM 40 MG/1
CAPSULE, DELAYED RELEASE ORAL
Qty: 90 CAPSULE | Refills: 1 | Status: SHIPPED | OUTPATIENT
Start: 2022-04-26 | End: 2022-10-14

## 2022-05-04 ENCOUNTER — HOSPITAL ENCOUNTER (OUTPATIENT)
Dept: BONE DENSITY | Facility: HOSPITAL | Age: 67
Discharge: HOME | End: 2022-05-04
Admitting: FAMILY MEDICINE

## 2022-05-04 DIAGNOSIS — M81.0 AGE-RELATED OSTEOPOROSIS WITHOUT CURRENT PATHOLOGICAL FRACTURE: ICD-10-CM

## 2022-05-04 PROCEDURE — 77080 DXA BONE DENSITY AXIAL: CPT

## 2022-05-06 ENCOUNTER — TELEPHONE (OUTPATIENT)
Dept: FAMILY MEDICINE CLINIC | Age: 67
End: 2022-05-06

## 2022-05-06 NOTE — TELEPHONE ENCOUNTER
Pt due for Prolia 5/16/22    Last Dexa -2.5  DEXA Bone Density Axial (05/04/2022 09:14)    Last CMP- Calcium 9.0  Basic Metabolic Panel (04/20/2022 09:51)    Last office Visit   Office Visit with Rosalinda Irizarry MD (04/20/2022)

## 2022-05-19 ENCOUNTER — LAB (OUTPATIENT)
Dept: LAB | Facility: HOSPITAL | Age: 67
End: 2022-05-19

## 2022-05-19 ENCOUNTER — CLINICAL SUPPORT (OUTPATIENT)
Dept: FAMILY MEDICINE CLINIC | Age: 67
End: 2022-05-19

## 2022-05-19 DIAGNOSIS — Z76.89 PERSONS ENCOUNTERING HEALTH SERVICES IN OTHER SPECIFIED CIRCUMSTANCES: ICD-10-CM

## 2022-05-19 DIAGNOSIS — M06.9 RHEUMATOID ARTHRITIS, INVOLVING UNSPECIFIED SITE, UNSPECIFIED WHETHER RHEUMATOID FACTOR PRESENT: ICD-10-CM

## 2022-05-19 DIAGNOSIS — M81.0 AGE-RELATED OSTEOPOROSIS WITHOUT CURRENT PATHOLOGICAL FRACTURE: ICD-10-CM

## 2022-05-19 LAB
BASOPHILS # BLD AUTO: 0.06 10*3/MM3 (ref 0–0.2)
BASOPHILS NFR BLD AUTO: 0.7 % (ref 0–1.5)
CRP SERPL-MCNC: <0.3 MG/DL (ref 0–0.5)
DEPRECATED RDW RBC AUTO: 48.9 FL (ref 37–54)
EOSINOPHIL # BLD AUTO: 0.08 10*3/MM3 (ref 0–0.4)
EOSINOPHIL NFR BLD AUTO: 1 % (ref 0.3–6.2)
ERYTHROCYTE [DISTWIDTH] IN BLOOD BY AUTOMATED COUNT: 14.2 % (ref 12.3–15.4)
ERYTHROCYTE [SEDIMENTATION RATE] IN BLOOD: 1 MM/HR (ref 0–30)
HCT VFR BLD AUTO: 37.7 % (ref 34–46.6)
HGB BLD-MCNC: 12 G/DL (ref 12–15.9)
IMM GRANULOCYTES # BLD AUTO: 0.03 10*3/MM3 (ref 0–0.05)
IMM GRANULOCYTES NFR BLD AUTO: 0.4 % (ref 0–0.5)
LYMPHOCYTES # BLD AUTO: 1.77 10*3/MM3 (ref 0.7–3.1)
LYMPHOCYTES NFR BLD AUTO: 21.5 % (ref 19.6–45.3)
MCH RBC QN AUTO: 30.5 PG (ref 26.6–33)
MCHC RBC AUTO-ENTMCNC: 31.8 G/DL (ref 31.5–35.7)
MCV RBC AUTO: 95.7 FL (ref 79–97)
MONOCYTES # BLD AUTO: 0.73 10*3/MM3 (ref 0.1–0.9)
MONOCYTES NFR BLD AUTO: 8.9 % (ref 5–12)
NEUTROPHILS NFR BLD AUTO: 5.56 10*3/MM3 (ref 1.7–7)
NEUTROPHILS NFR BLD AUTO: 67.5 % (ref 42.7–76)
PLATELET # BLD AUTO: 312 10*3/MM3 (ref 140–450)
PMV BLD AUTO: 9.2 FL (ref 6–12)
RBC # BLD AUTO: 3.94 10*6/MM3 (ref 3.77–5.28)
WBC NRBC COR # BLD: 8.23 10*3/MM3 (ref 3.4–10.8)

## 2022-05-19 PROCEDURE — 85652 RBC SED RATE AUTOMATED: CPT

## 2022-05-19 PROCEDURE — 96372 THER/PROPH/DIAG INJ SC/IM: CPT | Performed by: FAMILY MEDICINE

## 2022-05-19 PROCEDURE — 85025 COMPLETE CBC W/AUTO DIFF WBC: CPT

## 2022-05-19 PROCEDURE — 36415 COLL VENOUS BLD VENIPUNCTURE: CPT

## 2022-05-19 PROCEDURE — 86140 C-REACTIVE PROTEIN: CPT

## 2022-05-21 DIAGNOSIS — I10 ESSENTIAL HYPERTENSION: ICD-10-CM

## 2022-05-23 DIAGNOSIS — I10 ESSENTIAL HYPERTENSION: ICD-10-CM

## 2022-05-23 RX ORDER — ATENOLOL 25 MG/1
TABLET ORAL
Qty: 90 TABLET | Refills: 1 | Status: SHIPPED | OUTPATIENT
Start: 2022-05-23 | End: 2022-08-17

## 2022-05-23 RX ORDER — ATENOLOL 25 MG/1
TABLET ORAL
Qty: 90 TABLET | Refills: 1 | OUTPATIENT
Start: 2022-05-23

## 2022-06-02 DIAGNOSIS — I10 ESSENTIAL HYPERTENSION: ICD-10-CM

## 2022-06-02 RX ORDER — LOSARTAN POTASSIUM 100 MG/1
TABLET ORAL
Qty: 90 TABLET | Refills: 0 | Status: SHIPPED | OUTPATIENT
Start: 2022-06-02 | End: 2022-07-28 | Stop reason: SDUPTHER

## 2022-06-06 ENCOUNTER — TRANSCRIBE ORDERS (OUTPATIENT)
Dept: ADMINISTRATIVE | Facility: HOSPITAL | Age: 67
End: 2022-06-06

## 2022-06-06 DIAGNOSIS — M06.9 RHEUMATOID ARTHRITIS, INVOLVING UNSPECIFIED SITE, UNSPECIFIED WHETHER RHEUMATOID FACTOR PRESENT: Primary | ICD-10-CM

## 2022-06-06 DIAGNOSIS — Z79.899 ENCOUNTER FOR LONG-TERM (CURRENT) USE OF OTHER MEDICATIONS: ICD-10-CM

## 2022-06-30 RX ORDER — DULOXETIN HYDROCHLORIDE 60 MG/1
CAPSULE, DELAYED RELEASE ORAL
Qty: 90 CAPSULE | Refills: 0 | Status: SHIPPED | OUTPATIENT
Start: 2022-06-30 | End: 2022-07-28 | Stop reason: SDUPTHER

## 2022-07-28 ENCOUNTER — OFFICE VISIT (OUTPATIENT)
Dept: FAMILY MEDICINE CLINIC | Age: 67
End: 2022-07-28

## 2022-07-28 ENCOUNTER — LAB (OUTPATIENT)
Dept: LAB | Facility: HOSPITAL | Age: 67
End: 2022-07-28

## 2022-07-28 VITALS
DIASTOLIC BLOOD PRESSURE: 58 MMHG | SYSTOLIC BLOOD PRESSURE: 123 MMHG | HEART RATE: 57 BPM | BODY MASS INDEX: 22.3 KG/M2 | WEIGHT: 110.6 LBS | HEIGHT: 59 IN

## 2022-07-28 DIAGNOSIS — M81.0 AGE-RELATED OSTEOPOROSIS WITHOUT CURRENT PATHOLOGICAL FRACTURE: ICD-10-CM

## 2022-07-28 DIAGNOSIS — F33.1 MODERATE EPISODE OF RECURRENT MAJOR DEPRESSIVE DISORDER: Primary | ICD-10-CM

## 2022-07-28 DIAGNOSIS — Z79.899 ENCOUNTER FOR LONG-TERM (CURRENT) USE OF OTHER MEDICATIONS: ICD-10-CM

## 2022-07-28 DIAGNOSIS — M05.742 RHEUMATOID ARTHRITIS INVOLVING BOTH HANDS WITH POSITIVE RHEUMATOID FACTOR: ICD-10-CM

## 2022-07-28 DIAGNOSIS — M05.741 RHEUMATOID ARTHRITIS INVOLVING BOTH HANDS WITH POSITIVE RHEUMATOID FACTOR: ICD-10-CM

## 2022-07-28 DIAGNOSIS — I10 ESSENTIAL HYPERTENSION: ICD-10-CM

## 2022-07-28 DIAGNOSIS — F41.1 GENERALIZED ANXIETY DISORDER: ICD-10-CM

## 2022-07-28 DIAGNOSIS — M06.9 RHEUMATOID ARTHRITIS, INVOLVING UNSPECIFIED SITE, UNSPECIFIED WHETHER RHEUMATOID FACTOR PRESENT: ICD-10-CM

## 2022-07-28 PROBLEM — Z86.16 HISTORY OF COVID-19: Status: ACTIVE | Noted: 2022-07-28

## 2022-07-28 LAB
ALBUMIN SERPL-MCNC: 4.2 G/DL (ref 3.5–5.2)
ALP SERPL-CCNC: 48 U/L (ref 39–117)
ALT SERPL W P-5'-P-CCNC: 18 U/L (ref 1–33)
AST SERPL-CCNC: 19 U/L (ref 1–32)
BASOPHILS # BLD AUTO: 0.1 10*3/MM3 (ref 0–0.2)
BASOPHILS NFR BLD AUTO: 1.4 % (ref 0–1.5)
BILIRUB CONJ SERPL-MCNC: <0.2 MG/DL (ref 0–0.3)
BILIRUB INDIRECT SERPL-MCNC: NORMAL MG/DL
BILIRUB SERPL-MCNC: 0.3 MG/DL (ref 0–1.2)
CREAT SERPL-MCNC: 0.79 MG/DL (ref 0.57–1)
CRP SERPL-MCNC: <0.3 MG/DL (ref 0–0.5)
DEPRECATED RDW RBC AUTO: 51.3 FL (ref 37–54)
EGFRCR SERPLBLD CKD-EPI 2021: 82.1 ML/MIN/1.73
EOSINOPHIL # BLD AUTO: 0.13 10*3/MM3 (ref 0–0.4)
EOSINOPHIL NFR BLD AUTO: 1.8 % (ref 0.3–6.2)
ERYTHROCYTE [DISTWIDTH] IN BLOOD BY AUTOMATED COUNT: 14.7 % (ref 12.3–15.4)
ERYTHROCYTE [SEDIMENTATION RATE] IN BLOOD: <1 MM/HR (ref 0–30)
HCT VFR BLD AUTO: 39.5 % (ref 34–46.6)
HGB BLD-MCNC: 12.5 G/DL (ref 12–15.9)
IMM GRANULOCYTES # BLD AUTO: 0.02 10*3/MM3 (ref 0–0.05)
IMM GRANULOCYTES NFR BLD AUTO: 0.3 % (ref 0–0.5)
LYMPHOCYTES # BLD AUTO: 1.61 10*3/MM3 (ref 0.7–3.1)
LYMPHOCYTES NFR BLD AUTO: 22.3 % (ref 19.6–45.3)
MCH RBC QN AUTO: 30 PG (ref 26.6–33)
MCHC RBC AUTO-ENTMCNC: 31.6 G/DL (ref 31.5–35.7)
MCV RBC AUTO: 95 FL (ref 79–97)
MONOCYTES # BLD AUTO: 0.65 10*3/MM3 (ref 0.1–0.9)
MONOCYTES NFR BLD AUTO: 9 % (ref 5–12)
NEUTROPHILS NFR BLD AUTO: 4.71 10*3/MM3 (ref 1.7–7)
NEUTROPHILS NFR BLD AUTO: 65.2 % (ref 42.7–76)
PLATELET # BLD AUTO: 272 10*3/MM3 (ref 140–450)
PMV BLD AUTO: 9.3 FL (ref 6–12)
PROT SERPL-MCNC: 6.4 G/DL (ref 6–8.5)
RBC # BLD AUTO: 4.16 10*6/MM3 (ref 3.77–5.28)
WBC NRBC COR # BLD: 7.22 10*3/MM3 (ref 3.4–10.8)

## 2022-07-28 PROCEDURE — 85652 RBC SED RATE AUTOMATED: CPT

## 2022-07-28 PROCEDURE — 82565 ASSAY OF CREATININE: CPT

## 2022-07-28 PROCEDURE — 36415 COLL VENOUS BLD VENIPUNCTURE: CPT

## 2022-07-28 PROCEDURE — 85025 COMPLETE CBC W/AUTO DIFF WBC: CPT

## 2022-07-28 PROCEDURE — 86140 C-REACTIVE PROTEIN: CPT

## 2022-07-28 PROCEDURE — 80076 HEPATIC FUNCTION PANEL: CPT

## 2022-07-28 PROCEDURE — 99214 OFFICE O/P EST MOD 30 MIN: CPT | Performed by: FAMILY MEDICINE

## 2022-07-28 RX ORDER — LOSARTAN POTASSIUM 100 MG/1
100 TABLET ORAL DAILY
Qty: 90 TABLET | Refills: 1 | Status: SHIPPED | OUTPATIENT
Start: 2022-07-28 | End: 2022-12-02

## 2022-07-28 RX ORDER — BUSPIRONE HYDROCHLORIDE 10 MG/1
10 TABLET ORAL 2 TIMES DAILY PRN
Qty: 60 TABLET | Refills: 5 | Status: SHIPPED | OUTPATIENT
Start: 2022-07-28 | End: 2022-11-11 | Stop reason: DRUGHIGH

## 2022-07-28 RX ORDER — DULOXETIN HYDROCHLORIDE 60 MG/1
60 CAPSULE, DELAYED RELEASE ORAL 2 TIMES DAILY
Qty: 180 CAPSULE | Refills: 1 | Status: SHIPPED | OUTPATIENT
Start: 2022-07-28 | End: 2022-11-29

## 2022-07-28 NOTE — ASSESSMENT & PLAN NOTE
Her depression and anxiety both have been worse since her recent bout with COVID.  This is a known complication of long COVID, so we will increase her medication at least for the next few months and then see how she does.  Hopefully we will be able to get her back down on the dosage eventually.  For her duloxetine, I am going to increase her to 60 mg twice daily and for her buspirone, we will take her up to 10 mg twice daily.  Continue to monitor.  I will see her back in 3 months.

## 2022-07-28 NOTE — PROGRESS NOTES
Chief Complaint  Hypertension (3 month follow up)    Subjective          Jaqueline Katz presents to [facility] today for follow-up of chronic issues.    She came down with COVID about a month ago.  Ever since, she has noticed more anxiety and depression.  She lost some sense of taste but that is improved.  She had some SOB during the infection itself but that seems to be better too.  The mental health complaints are what are bothering her most at this point.  Motivation is poor.  She is on duloxetine and buspirone for mild depression and anxiety.  She has used Xanax (was afraid of addiction) and hydroxyzine (overly sedating) previously.    She is on losartan and atenolol for hypertension.  Her blood pressure has been well controlled.  She denies chest pain, palpitations or shortness of breath.     She is on methotrexate for rheumatoid arthritis.  Following with Rheumatology in [place].  Going over her for lab work after this appointment for her rheumatologist.     She is on esomeprazole for GERD.  No indigestion or heartburn.     She is on Prolia for osteoporosis.  She is up-to-date on DEXA, last done 5/2022 and this showed osteoporosis.         Current Outpatient Medications:   •  atenolol (TENORMIN) 25 MG tablet, TAKE TWO TABLETS BY MOUTH EVERY DAY, Disp: 90 tablet, Rfl: 1  •  busPIRone (BUSPAR) 10 MG tablet, Take 1 tablet by mouth 2 (Two) Times a Day As Needed (anxiety)., Disp: 60 tablet, Rfl: 5  •  denosumab (Prolia) 60 MG/ML solution prefilled syringe syringe, 1 mL Every 6 (Six) Months., Disp: , Rfl:   •  DULoxetine (CYMBALTA) 60 MG capsule, Take 1 capsule by mouth 2 (Two) Times a Day., Disp: 180 capsule, Rfl: 1  •  esomeprazole (nexIUM) 40 MG capsule, TAKE ONE CAPSULE BY MOUTH EVERY DAY, Disp: 90 capsule, Rfl: 1  •  folic acid (FOLVITE) 1 MG tablet, Take 1,000 mcg by mouth Daily., Disp: , Rfl:   •  losartan (COZAAR) 100 MG tablet, Take 1 tablet by mouth Daily., Disp: 90  "tablet, Rfl: 1  •  methotrexate 2.5 MG tablet, Take 8 tablets by mouth 1 (One) Time Per Week., Disp: , Rfl:   •  montelukast (Singulair) 10 MG tablet, Take 1 tablet by mouth Every Night., Disp: 90 tablet, Rfl: 3  •  olopatadine (PATANOL) 0.1 % ophthalmic solution, Patanol 0.1 % eye drops  INSTILL 1 DROP INTO AFFECTED EYE(S) BY OPHTHALMIC ROUTE 2 TIMES PER DAY AT AN INTERVAL OF 6 TO 8 HOURS, Disp: , Rfl:     Current Facility-Administered Medications:   •  denosumab (PROLIA) syringe 60 mg, 60 mg, Subcutaneous, Q6 Months, Rosalinda Irizarry MD, 60 mg at 05/19/22 1053    Allergies:  Penicillins and Nsaids      Objective   Vital Signs:   Vitals:    07/28/22 0916   BP: 123/58   BP Location: Left arm   Patient Position: Sitting   Pulse: 57   Weight: 50.2 kg (110 lb 9.6 oz)   Height: 149.9 cm (59.02\")       Physical Exam  Vitals reviewed.   Constitutional:       General: She is not in acute distress.     Appearance: Normal appearance. She is well-developed.   HENT:      Head: Normocephalic and atraumatic.      Right Ear: External ear normal.      Left Ear: External ear normal.   Eyes:      Extraocular Movements: Extraocular movements intact.      Conjunctiva/sclera: Conjunctivae normal.      Pupils: Pupils are equal, round, and reactive to light.   Cardiovascular:      Rate and Rhythm: Normal rate and regular rhythm.      Heart sounds: No murmur heard.  Pulmonary:      Effort: Pulmonary effort is normal.      Breath sounds: Normal breath sounds. No wheezing, rhonchi or rales.   Abdominal:      General: Bowel sounds are normal. There is no distension.      Palpations: Abdomen is soft.      Tenderness: There is no abdominal tenderness.   Musculoskeletal:         General: Normal range of motion.   Neurological:      Mental Status: She is alert.   Psychiatric:         Mood and Affect: Affect normal.          Lab Results   Component Value Date    GLUCOSE 87 04/20/2022    BUN 20 04/20/2022    CREATININE 0.81 04/20/2022    " EGFRIFNONA 59 (L) 02/07/2022    BCR 24.7 04/20/2022    K 4.6 04/20/2022    CO2 22.9 04/20/2022    CALCIUM 9.0 04/20/2022    ALBUMIN 4.40 04/20/2022    LABIL2 1.5 03/24/2021    AST 20 04/20/2022    ALT 17 04/20/2022       Lab Results   Component Value Date    CHOL 208 (H) 01/04/2022    CHLPL 215 (H) 03/24/2021    TRIG 101 01/04/2022    HDL 56 01/04/2022     (H) 01/04/2022            Assessment and Plan    Diagnoses and all orders for this visit:    1. Moderate episode of recurrent major depressive disorder (HCC) (Primary)  Assessment & Plan:  Her depression and anxiety both have been worse since her recent bout with COVID.  This is a known complication of long COVID, so we will increase her medication at least for the next few months and then see how she does.  Hopefully we will be able to get her back down on the dosage eventually.  For her duloxetine, I am going to increase her to 60 mg twice daily and for her buspirone, we will take her up to 10 mg twice daily.  Continue to monitor.  I will see her back in 3 months.    Orders:  -     DULoxetine (CYMBALTA) 60 MG capsule; Take 1 capsule by mouth 2 (Two) Times a Day.  Dispense: 180 capsule; Refill: 1    2. Generalized anxiety disorder  Assessment & Plan:  As per depression plan.    Orders:  -     busPIRone (BUSPAR) 10 MG tablet; Take 1 tablet by mouth 2 (Two) Times a Day As Needed (anxiety).  Dispense: 60 tablet; Refill: 5  -     DULoxetine (CYMBALTA) 60 MG capsule; Take 1 capsule by mouth 2 (Two) Times a Day.  Dispense: 180 capsule; Refill: 1    3. Essential hypertension  Assessment & Plan:  Blood pressure at goal.  Continue losartan 100 mg daily and atenolol 25 mg daily.  Refill sent as below.  Labs are reviewed and up-to-date.    Orders:  -     losartan (COZAAR) 100 MG tablet; Take 1 tablet by mouth Daily.  Dispense: 90 tablet; Refill: 1    4. Rheumatoid arthritis involving both hands with positive rheumatoid factor (HCC)  Assessment & Plan:  Stable on  methotrexate.  Following with Rheumatology.      5. Age-related osteoporosis without current pathological fracture  Overview:  On Prolia.  DEXA is up-to-date, last done 5/2022 showing osteoporosis.        Follow Up   Return in about 3 months (around 10/28/2022) for Medicare Wellness.  Patient was given instructions and counseling regarding her condition or for health maintenance advice. Please see specific information pulled into the AVS if appropriate.

## 2022-07-28 NOTE — ASSESSMENT & PLAN NOTE
Blood pressure at goal.  Continue losartan 100 mg daily and atenolol 25 mg daily.  Refill sent as below.  Labs are reviewed and up-to-date.

## 2022-08-17 DIAGNOSIS — I10 ESSENTIAL HYPERTENSION: ICD-10-CM

## 2022-08-17 RX ORDER — ATENOLOL 25 MG/1
TABLET ORAL
Qty: 90 TABLET | Refills: 1 | Status: SHIPPED | OUTPATIENT
Start: 2022-08-17 | End: 2022-08-17

## 2022-08-17 RX ORDER — ATENOLOL 25 MG/1
TABLET ORAL
Qty: 90 TABLET | Refills: 1 | Status: SHIPPED | OUTPATIENT
Start: 2022-08-17 | End: 2022-11-22

## 2022-09-27 NOTE — ASSESSMENT & PLAN NOTE
BP at goal.  She has been taking the losartan 50 mg BID, so I am going to send in 100 mg daily for her to simplify her regimen.  No changes to atenolol.  Labs reviewed and  UTD.  RTC 4 months for JASMINA.   No indicators present

## 2022-10-04 ENCOUNTER — TELEPHONE (OUTPATIENT)
Dept: FAMILY MEDICINE CLINIC | Age: 67
End: 2022-10-04

## 2022-10-04 NOTE — TELEPHONE ENCOUNTER
Caller: [employer]     Relationship:     Best call back number: [phone]    What is the best time to reach you: ANYTIME     Who are you requesting to speak with (clinical staff, provider,  specific staff member):  CLINICAL       What was the call regarding: REQUESTING FOR PATIENT TO BE EXCUSED  FROM JURY DUTY, FOR ANXIETY PURPOSES.     Do you require a callback: YES

## 2022-10-05 NOTE — TELEPHONE ENCOUNTER
Spoke to Denisha, pt needs within 5 days, pt would need to be seen by another provider due to Dr Irizarry being out of office.

## 2022-10-11 ENCOUNTER — TRANSCRIBE ORDERS (OUTPATIENT)
Dept: ADMINISTRATIVE | Facility: HOSPITAL | Age: 67
End: 2022-10-11

## 2022-10-11 DIAGNOSIS — Z79.899 ENCOUNTER FOR LONG-TERM (CURRENT) USE OF OTHER MEDICATIONS: ICD-10-CM

## 2022-10-11 DIAGNOSIS — R53.83 OTHER FATIGUE: ICD-10-CM

## 2022-10-11 DIAGNOSIS — M06.9 RHEUMATOID ARTHRITIS, INVOLVING UNSPECIFIED SITE, UNSPECIFIED WHETHER RHEUMATOID FACTOR PRESENT: Primary | ICD-10-CM

## 2022-10-11 NOTE — TELEPHONE ENCOUNTER
Yes, after review of her chart, I am willing to write that for her.  Letter sent to queue.  Thanks, JONATHAN

## 2022-10-13 DIAGNOSIS — J30.1 SEASONAL ALLERGIC RHINITIS DUE TO POLLEN: ICD-10-CM

## 2022-10-13 RX ORDER — MONTELUKAST SODIUM 10 MG/1
TABLET ORAL
Qty: 90 TABLET | Refills: 3 | Status: SHIPPED | OUTPATIENT
Start: 2022-10-13

## 2022-10-14 DIAGNOSIS — K21.9 GASTROESOPHAGEAL REFLUX DISEASE WITHOUT ESOPHAGITIS: ICD-10-CM

## 2022-10-14 RX ORDER — ESOMEPRAZOLE MAGNESIUM 40 MG/1
CAPSULE, DELAYED RELEASE ORAL
Qty: 90 CAPSULE | Refills: 1 | Status: SHIPPED | OUTPATIENT
Start: 2022-10-14

## 2022-10-17 ENCOUNTER — LAB (OUTPATIENT)
Dept: LAB | Facility: HOSPITAL | Age: 67
End: 2022-10-17

## 2022-10-17 DIAGNOSIS — M06.9 RHEUMATOID ARTHRITIS, INVOLVING UNSPECIFIED SITE, UNSPECIFIED WHETHER RHEUMATOID FACTOR PRESENT: ICD-10-CM

## 2022-10-17 DIAGNOSIS — R53.83 OTHER FATIGUE: ICD-10-CM

## 2022-10-17 DIAGNOSIS — Z79.899 ENCOUNTER FOR LONG-TERM (CURRENT) USE OF OTHER MEDICATIONS: ICD-10-CM

## 2022-10-17 LAB
ALBUMIN SERPL-MCNC: 3.9 G/DL (ref 3.5–5.2)
ALP SERPL-CCNC: 50 U/L (ref 39–117)
ALT SERPL W P-5'-P-CCNC: 14 U/L (ref 1–33)
AST SERPL-CCNC: 17 U/L (ref 1–32)
BASOPHILS # BLD AUTO: 0.07 10*3/MM3 (ref 0–0.2)
BASOPHILS NFR BLD AUTO: 1.2 % (ref 0–1.5)
BILIRUB CONJ SERPL-MCNC: <0.2 MG/DL (ref 0–0.3)
BILIRUB INDIRECT SERPL-MCNC: NORMAL MG/DL
BILIRUB SERPL-MCNC: <0.2 MG/DL (ref 0–1.2)
CREAT SERPL-MCNC: 0.8 MG/DL (ref 0.57–1)
CRP SERPL-MCNC: <0.3 MG/DL (ref 0–0.5)
DEPRECATED RDW RBC AUTO: 47.1 FL (ref 37–54)
EGFRCR SERPLBLD CKD-EPI 2021: 80.4 ML/MIN/1.73
EOSINOPHIL # BLD AUTO: 0.15 10*3/MM3 (ref 0–0.4)
EOSINOPHIL NFR BLD AUTO: 2.6 % (ref 0.3–6.2)
ERYTHROCYTE [DISTWIDTH] IN BLOOD BY AUTOMATED COUNT: 13.7 % (ref 12.3–15.4)
ERYTHROCYTE [SEDIMENTATION RATE] IN BLOOD: 2 MM/HR (ref 0–30)
HCT VFR BLD AUTO: 38.1 % (ref 34–46.6)
HGB BLD-MCNC: 12.3 G/DL (ref 12–15.9)
IMM GRANULOCYTES # BLD AUTO: 0.01 10*3/MM3 (ref 0–0.05)
IMM GRANULOCYTES NFR BLD AUTO: 0.2 % (ref 0–0.5)
LYMPHOCYTES # BLD AUTO: 1.12 10*3/MM3 (ref 0.7–3.1)
LYMPHOCYTES NFR BLD AUTO: 19.6 % (ref 19.6–45.3)
MCH RBC QN AUTO: 30.3 PG (ref 26.6–33)
MCHC RBC AUTO-ENTMCNC: 32.3 G/DL (ref 31.5–35.7)
MCV RBC AUTO: 93.8 FL (ref 79–97)
MONOCYTES # BLD AUTO: 0.54 10*3/MM3 (ref 0.1–0.9)
MONOCYTES NFR BLD AUTO: 9.4 % (ref 5–12)
NEUTROPHILS NFR BLD AUTO: 3.83 10*3/MM3 (ref 1.7–7)
NEUTROPHILS NFR BLD AUTO: 67 % (ref 42.7–76)
PLATELET # BLD AUTO: 319 10*3/MM3 (ref 140–450)
PMV BLD AUTO: 9.8 FL (ref 6–12)
PROT SERPL-MCNC: 6.3 G/DL (ref 6–8.5)
RBC # BLD AUTO: 4.06 10*6/MM3 (ref 3.77–5.28)
WBC NRBC COR # BLD: 5.72 10*3/MM3 (ref 3.4–10.8)

## 2022-10-17 PROCEDURE — 85652 RBC SED RATE AUTOMATED: CPT

## 2022-10-17 PROCEDURE — 86140 C-REACTIVE PROTEIN: CPT

## 2022-10-17 PROCEDURE — 80076 HEPATIC FUNCTION PANEL: CPT

## 2022-10-17 PROCEDURE — 85025 COMPLETE CBC W/AUTO DIFF WBC: CPT

## 2022-10-17 PROCEDURE — 36415 COLL VENOUS BLD VENIPUNCTURE: CPT

## 2022-10-17 PROCEDURE — 82565 ASSAY OF CREATININE: CPT

## 2022-10-20 ENCOUNTER — TELEPHONE (OUTPATIENT)
Dept: FAMILY MEDICINE CLINIC | Age: 67
End: 2022-10-20

## 2022-10-20 NOTE — TELEPHONE ENCOUNTER
Pt due for Prolia 11/19/22    Last Dexa -2.5  DEXA Bone Density Axial (05/04/2022 09:14)    Last CMP- Calcium 9.0  Basic Metabolic Panel (04/20/2022 09:51)    Last office Visit   Office Visit with Rosalinda Irizarry MD (07/28/2022)

## 2022-10-25 ENCOUNTER — OFFICE VISIT (OUTPATIENT)
Dept: FAMILY MEDICINE CLINIC | Age: 67
End: 2022-10-25

## 2022-10-25 VITALS
SYSTOLIC BLOOD PRESSURE: 145 MMHG | WEIGHT: 115.2 LBS | DIASTOLIC BLOOD PRESSURE: 83 MMHG | HEART RATE: 71 BPM | HEIGHT: 59 IN | BODY MASS INDEX: 23.22 KG/M2

## 2022-10-25 DIAGNOSIS — F33.1 MODERATE EPISODE OF RECURRENT MAJOR DEPRESSIVE DISORDER: ICD-10-CM

## 2022-10-25 DIAGNOSIS — Z23 ENCOUNTER FOR IMMUNIZATION: ICD-10-CM

## 2022-10-25 DIAGNOSIS — F41.1 GENERALIZED ANXIETY DISORDER: ICD-10-CM

## 2022-10-25 DIAGNOSIS — Z12.31 SCREENING MAMMOGRAM, ENCOUNTER FOR: ICD-10-CM

## 2022-10-25 DIAGNOSIS — I10 ESSENTIAL HYPERTENSION: ICD-10-CM

## 2022-10-25 DIAGNOSIS — Z00.00 ANNUAL PHYSICAL EXAM: Primary | ICD-10-CM

## 2022-10-25 DIAGNOSIS — K21.9 GASTROESOPHAGEAL REFLUX DISEASE WITHOUT ESOPHAGITIS: ICD-10-CM

## 2022-10-25 DIAGNOSIS — Z23 NEED FOR VACCINATION: ICD-10-CM

## 2022-10-25 PROCEDURE — 0124A PR ADM SARSCOV2 30MCG/0.3ML BST: CPT | Performed by: FAMILY MEDICINE

## 2022-10-25 PROCEDURE — G0008 ADMIN INFLUENZA VIRUS VAC: HCPCS | Performed by: FAMILY MEDICINE

## 2022-10-25 PROCEDURE — 1159F MED LIST DOCD IN RCRD: CPT | Performed by: FAMILY MEDICINE

## 2022-10-25 PROCEDURE — 90662 IIV NO PRSV INCREASED AG IM: CPT | Performed by: FAMILY MEDICINE

## 2022-10-25 PROCEDURE — 91312 COVID-19 (PFIZER) BIVALENT BOOSTER 12+YRS: CPT | Performed by: FAMILY MEDICINE

## 2022-10-25 PROCEDURE — G0439 PPPS, SUBSEQ VISIT: HCPCS | Performed by: FAMILY MEDICINE

## 2022-10-25 PROCEDURE — 1170F FXNL STATUS ASSESSED: CPT | Performed by: FAMILY MEDICINE

## 2022-10-25 PROCEDURE — 99214 OFFICE O/P EST MOD 30 MIN: CPT | Performed by: FAMILY MEDICINE

## 2022-10-25 RX ORDER — AMLODIPINE BESYLATE 2.5 MG/1
2.5 TABLET ORAL DAILY
Qty: 30 TABLET | Refills: 5 | Status: SHIPPED | OUTPATIENT
Start: 2022-10-25 | End: 2023-01-06

## 2022-10-25 NOTE — ASSESSMENT & PLAN NOTE
She is UTD on colonoscopy, last done at [facility] 2/2020.  Pap smears no longer indicated by age and history.  She is due for mammogram, last done 2/2021 at [facility] and that was normal; ordered.  She is UTD on DEXA, last done 5/2022 and this showed osteoporosis.  She is on Prolia for osteoporosis.  She is up-to-date on Covid (due for bivalent booster - given today), Pneumovax (1/2021) and Prevnar (1/2020).  She is due for Shingrix, Td and high-dose flu.  High dose flu given today.  Shingrix and Tdap can be done at the pharmacy.  She is due for routine labs including CMP and lipids; ordered.

## 2022-10-25 NOTE — ASSESSMENT & PLAN NOTE
She is continuing to struggle with her anxiety, which has been previously well controlled but worsened again after her recent COVID infection.  She is currently on Cymbalta 60 mg twice daily as well as buspirone 10 mg twice daily.  The anxiety has become quite severe at times, prompting her to think about going back to smoking which she has previously quit.  We discussed options today and have decided to get her in with [facility] for further assistance with med management.  Appreciate her assistance.

## 2022-10-25 NOTE — PROGRESS NOTES
The ABCs of the Annual Wellness Visit  Subsequent Medicare Wellness Visit    Chief Complaint   Patient presents with   • Medicare Wellness-subsequent      Subjective    History of Present Illness:  Jaqueline Katz is a 67 y.o. female who presents for a Subsequent Medicare Wellness Visit.    She is UTD on colonoscopy, last done at [facility] 2/2020.  Pap smears no longer indicated by age and history.  She is due for mammogram, last done 2/2021 at [place] and that was normal.  She is UTD on DEXA, last done 5/2022 and this showed osteoporosis.  She is on Prolia for osteoporosis.  She is up-to-date on Covid (due for bivalent booster), Pneumovax (1/2021) and Prevnar (1/2020).  She is due for Shingrix, Td and high-dose flu.  She is due for routine labs including CMP and lipids.    She is on losartan and atenolol for hypertension.  Her blood pressure has been well controlled.  She denies chest pain, palpitations or shortness of breath.    She is on duloxetine and buspirone for anxiety.  She is still experiencing a fair amount of anxiety.     She is on methotrexate for rheumatoid arthritis.  Following with Rheumatology in [place].  Going over her for lab work after this appointment for her rheumatologist.     She is on esomeprazole for GERD.  No indigestion or heartburn.     She is on Prolia for osteoporosis.  She is up-to-date on DEXA, last done 5/2022 and this showed osteoporosis.    The following portions of the patient's history were reviewed and   updated as appropriate: allergies, current medications, past family history, past medical history, past social history, past surgical history and problem list.    Compared to one year ago, the patient feels her physical   health is the same.    Compared to one year ago, the patient feels her mental   health is the same.    Recent Hospitalizations:  She was not admitted to the hospital during the last year.       Current Medical Providers:  Patient Care  Team:  Rosalinda Irizarry MD as PCP - General (Family Medicine)    Outpatient Medications Prior to Visit   Medication Sig Dispense Refill   • atenolol (TENORMIN) 25 MG tablet TAKE TWO TABLETS BY MOUTH EVERY DAY 90 tablet 1   • busPIRone (BUSPAR) 10 MG tablet Take 1 tablet by mouth 2 (Two) Times a Day As Needed (anxiety). 60 tablet 5   • denosumab (Prolia) 60 MG/ML solution prefilled syringe syringe 1 mL Every 6 (Six) Months.     • DULoxetine (CYMBALTA) 60 MG capsule Take 1 capsule by mouth 2 (Two) Times a Day. 180 capsule 1   • esomeprazole (nexIUM) 40 MG capsule TAKE ONE CAPSULE BY MOUTH EVERY DAY 90 capsule 1   • folic acid (FOLVITE) 1 MG tablet Take 1,000 mcg by mouth Daily.     • losartan (COZAAR) 100 MG tablet Take 1 tablet by mouth Daily. 90 tablet 1   • methotrexate 2.5 MG tablet Take 8 tablets by mouth 1 (One) Time Per Week.     • montelukast (SINGULAIR) 10 MG tablet TAKE ONE TABLET BY MOUTH EVERY EVENING 90 tablet 3   • olopatadine (PATANOL) 0.1 % ophthalmic solution Patanol 0.1 % eye drops   INSTILL 1 DROP INTO AFFECTED EYE(S) BY OPHTHALMIC ROUTE 2 TIMES PER DAY AT AN INTERVAL OF 6 TO 8 HOURS       Facility-Administered Medications Prior to Visit   Medication Dose Route Frequency Provider Last Rate Last Admin   • denosumab (PROLIA) syringe 60 mg  60 mg Subcutaneous Q6 Months Rosalinda Irizarry MD   60 mg at 05/19/22 1053       No opioid medication identified on active medication list. I have reviewed chart for other potential  high risk medication/s and harmful drug interactions in the elderly.          Aspirin is not on active medication list.  Aspirin use is not indicated based on review of current medical condition/s. Risk of harm outweighs potential benefits.  .    Patient Active Problem List   Diagnosis   • Essential hypertension   • Gastroesophageal reflux disease without esophagitis   • Primary osteoarthritis involving multiple joints   • Moderate episode of recurrent major depressive  "disorder ([facility])   • Generalized anxiety disorder   • Other irritable bowel syndrome   • Age-related osteoporosis without current pathological fracture   • Rheumatoid arthritis involving both hands with positive rheumatoid factor ([facility])   • Annual physical exam   • Seasonal allergic rhinitis due to pollen   • History of COVID-19     Advance Care Planning  Advance Directive is not on file.  ACP discussion was held with the patient during this visit. Patient has an advance directive (not in EMR), copy requested.    Review of Systems   Constitutional: Negative for chills, fatigue and fever.   HENT: Negative for congestion, hearing loss and rhinorrhea.    Eyes: Negative for pain and visual disturbance.   Respiratory: Negative for cough and shortness of breath.    Cardiovascular: Negative for chest pain and palpitations.   Gastrointestinal: Positive for abdominal pain (epigastric), constipation and nausea. Negative for diarrhea and vomiting.        +bloating, indigestion   Genitourinary: Negative for difficulty urinating and dysuria.   Musculoskeletal: Positive for arthralgias. Negative for myalgias.   Neurological: Negative for weakness and numbness.   Psychiatric/Behavioral: Negative for dysphoric mood and sleep disturbance. The patient is nervous/anxious.         Objective    Vitals:    10/25/22 0929 10/25/22 1016   BP: 145/79 145/83   BP Location: Right arm Right arm   Patient Position: Sitting Sitting   Pulse: 63 71   Weight: 52.3 kg (115 lb 3.2 oz)    Height: 149.9 cm (59.02\")      Estimated body mass index is 23.25 kg/m² as calculated from the following:    Height as of this encounter: 149.9 cm (59.02\").    Weight as of this encounter: 52.3 kg (115 lb 3.2 oz).    BMI is within normal parameters. No other follow-up for BMI required.      Does the patient have evidence of cognitive impairment? No    Physical Exam  Vitals reviewed.   Constitutional:       General: She is not in acute distress.     Appearance: Normal " appearance. She is well-developed.   HENT:      Head: Normocephalic and atraumatic.      Right Ear: External ear normal.      Left Ear: External ear normal.      Mouth/Throat:      Mouth: Mucous membranes are moist.   Eyes:      Extraocular Movements: Extraocular movements intact.      Conjunctiva/sclera: Conjunctivae normal.      Pupils: Pupils are equal, round, and reactive to light.   Cardiovascular:      Rate and Rhythm: Normal rate and regular rhythm.      Heart sounds: No murmur heard.  Pulmonary:      Effort: Pulmonary effort is normal.      Breath sounds: Normal breath sounds. No wheezing, rhonchi or rales.   Abdominal:      General: Bowel sounds are normal. There is no distension.      Palpations: Abdomen is soft.      Tenderness: There is no abdominal tenderness.   Musculoskeletal:         General: Normal range of motion.   Skin:     General: Skin is warm and dry.   Neurological:      Mental Status: She is alert and oriented to person, place, and time.      Deep Tendon Reflexes: Reflexes normal.   Psychiatric:         Mood and Affect: Mood and affect normal.         Behavior: Behavior normal.         Thought Content: Thought content normal.         Judgment: Judgment normal.                 HEALTH RISK ASSESSMENT    Smoking Status:  Social History     Tobacco Use   Smoking Status Former   • Types: Cigarettes   • Quit date:    • Years since quittin.8   Smokeless Tobacco Never     Alcohol Consumption:  Social History     Substance and Sexual Activity   Alcohol Use None     Fall Risk Screen:    [facility], and patient is at LOW risk for falls.Assessment completed on:10/25/2022    Depression Screening:  PHQ-2/PHQ-9 Depression Screening 10/25/2022   Retired PHQ-9 Total Score -   Retired Total Score -   Little Interest or Pleasure in Doing Things 0-->not at all   Feeling Down, Depressed or Hopeless 0-->not at all   PHQ-9: Brief Depression Severity Measure Score 0        Health Habits and Functional and Cognitive Screening:  Functional & Cognitive Status 10/25/2022   Do you have difficulty preparing food and eating? No   Do you have difficulty bathing yourself, getting dressed or grooming yourself? No   Do you have difficulty using the toilet? No   Do you have difficulty moving around from place to place? No   Do you have trouble with steps or getting out of a bed or a chair? No   Current Diet Well Balanced Diet   Dental Exam Up to date   Eye Exam Up to date   Exercise (times per week) 3 times per week   Current Exercises Include Walking   Do you need help using the phone?  No   Are you deaf or do you have serious difficulty hearing?  No   Do you need help with transportation? No   Do you need help shopping? No   Do you need help preparing meals?  No   Do you need help with housework?  No   Do you need help with laundry? No   Do you need help taking your medications? No   Do you need help managing money? No   Do you ever drive or ride in a car without wearing a seat belt? No   Have you felt unusual stress, anger or loneliness in the last month? No   Who do you live with? Spouse   If you need help, do you have trouble finding someone available to you? No   Have you been bothered in the last four weeks by sexual problems? No   Do you have difficulty concentrating, remembering or making decisions? No       Age-appropriate Screening Schedule:  Refer to the list below for future screening recommendations based on patient's age, sex and/or medical conditions. Orders for these recommended tests are listed in the plan section. The patient has been provided with a written plan.    Health Maintenance   Topic Date Due   • INFLUENZA VACCINE  08/01/2022   • ZOSTER VACCINE (1 of 2) 10/25/2022 (Originally 9/8/2004)   • TDAP/TD VACCINES (2 - Tdap) 10/25/2023 (Originally 2/7/2012)   • MAMMOGRAM  02/05/2023   • DXA SCAN  05/04/2024              Assessment & Plan   CMS Preventative Services  Quick Reference  Risk Factors Identified During Encounter  Immunizations Discussed/Encouraged (specific Immunizations; Tdap, Influenza and Shingrix  The above risks/problems have been discussed with the patient.  Follow up actions/plans if indicated are seen below in the Assessment/Plan Section.  Pertinent information has been shared with the patient in the After Visit Summary.    Diagnoses and all orders for this visit:    1. Annual physical exam (Primary)  Assessment & Plan:  She is UTD on colonoscopy, last done at [facility] 2/2020.  Pap smears no longer indicated by age and history.  She is due for mammogram, last done 2/2021 at [facility] and that was normal; ordered.  She is UTD on DEXA, last done 5/2022 and this showed osteoporosis.  She is on Prolia for osteoporosis.  She is up-to-date on Covid (due for bivalent booster - given today), Pneumovax (1/2021) and Prevnar (1/2020).  She is due for Shingrix, Td and high-dose flu.  High dose flu given today.  Shingrix and Tdap can be done at the pharmacy.  She is due for routine labs including CMP and lipids; ordered.      2. Essential hypertension  Assessment & Plan:  Blood pressure continues to run a bit high.  She is maxed out on losartan 100 mg daily and her atenolol 25 mg daily does not have much room for increase due to her pulse rate in the 60s.  I am going to add in amlodipine 2.5 mg daily we will see how she does with that going forward.  Checking labs.    Orders:  -     Comprehensive Metabolic Panel; Future  -     Lipid Panel; Future  -     amLODIPine (NORVASC) 2.5 MG tablet; Take 1 tablet by mouth Daily.  Dispense: 30 tablet; Refill: 5    3. Gastroesophageal reflux disease without esophagitis  Assessment & Plan:  GERD refractory to PPI therapy with Nexium.  Referral placed for GI (at [facility]) for further eval and possible scope.    Orders:  -     Ambulatory Referral to Gastroenterology    4. Moderate episode of recurrent major depressive  disorder (HCC)  Assessment & Plan:  As per anxiety plan.    Orders:  -     Ambulatory Referral to Psychiatry    5. Generalized anxiety disorder  Assessment & Plan:  She is continuing to struggle with her anxiety, which has been previously well controlled but worsened again after her recent COVID infection.  She is currently on Cymbalta 60 mg twice daily as well as buspirone 10 mg twice daily.  The anxiety has become quite severe at times, prompting her to think about going back to smoking which she has previously quit.  We discussed options today and have decided to get her in with [facility] for further assistance with med management.  Appreciate her assistance.    Orders:  -     Ambulatory Referral to Psychiatry    6. Screening mammogram, encounter for  -     Mammo Screening Digital Tomosynthesis Bilateral With CAD; Future    7. Encounter for immunization  -     Fluzone High-Dose 65+yrs      Follow Up:   Return in about 6 months (around 4/25/2023) for Recheck.     An After Visit Summary and PPPS were made available to the patient.

## 2022-10-25 NOTE — ASSESSMENT & PLAN NOTE
GERD refractory to PPI therapy with Nexium.  Referral placed for GI (at [facility]) for further eval and possible scope.

## 2022-10-25 NOTE — ASSESSMENT & PLAN NOTE
Blood pressure continues to run a bit high.  She is maxed out on losartan 100 mg daily and her atenolol 25 mg daily does not have much room for increase due to her pulse rate in the 60s.  I am going to add in amlodipine 2.5 mg daily we will see how she does with that going forward.  Checking labs.

## 2022-10-26 DIAGNOSIS — F41.1 GENERALIZED ANXIETY DISORDER: ICD-10-CM

## 2022-10-26 RX ORDER — BUSPIRONE HYDROCHLORIDE 5 MG/1
TABLET ORAL
Qty: 180 TABLET | Refills: 1 | OUTPATIENT
Start: 2022-10-26

## 2022-11-04 ENCOUNTER — TELEPHONE (OUTPATIENT)
Dept: FAMILY MEDICINE CLINIC | Age: 67
End: 2022-11-04

## 2022-11-11 ENCOUNTER — CLINICAL SUPPORT (OUTPATIENT)
Dept: FAMILY MEDICINE CLINIC | Age: 67
End: 2022-11-11

## 2022-11-11 ENCOUNTER — LAB (OUTPATIENT)
Dept: LAB | Facility: HOSPITAL | Age: 67
End: 2022-11-11

## 2022-11-11 ENCOUNTER — OFFICE VISIT (OUTPATIENT)
Dept: BEHAVIORAL HEALTH | Facility: CLINIC | Age: 67
End: 2022-11-11

## 2022-11-11 VITALS
SYSTOLIC BLOOD PRESSURE: 126 MMHG | WEIGHT: 111.6 LBS | BODY MASS INDEX: 22.5 KG/M2 | DIASTOLIC BLOOD PRESSURE: 82 MMHG | HEIGHT: 59 IN | HEART RATE: 83 BPM

## 2022-11-11 DIAGNOSIS — F41.1 GENERALIZED ANXIETY DISORDER: Primary | Chronic | ICD-10-CM

## 2022-11-11 DIAGNOSIS — M81.0 AGE-RELATED OSTEOPOROSIS WITHOUT CURRENT PATHOLOGICAL FRACTURE: ICD-10-CM

## 2022-11-11 DIAGNOSIS — F33.0 MILD EPISODE OF RECURRENT MAJOR DEPRESSIVE DISORDER: ICD-10-CM

## 2022-11-11 DIAGNOSIS — I10 ESSENTIAL HYPERTENSION: ICD-10-CM

## 2022-11-11 LAB
ALBUMIN SERPL-MCNC: 4.6 G/DL (ref 3.5–5.2)
ALBUMIN/GLOB SERPL: 1.9 G/DL
ALP SERPL-CCNC: 52 U/L (ref 39–117)
ALT SERPL W P-5'-P-CCNC: 9 U/L (ref 1–33)
ANION GAP SERPL CALCULATED.3IONS-SCNC: 10.1 MMOL/L (ref 5–15)
AST SERPL-CCNC: 17 U/L (ref 1–32)
BILIRUB SERPL-MCNC: 0.3 MG/DL (ref 0–1.2)
BUN SERPL-MCNC: 18 MG/DL (ref 8–23)
BUN/CREAT SERPL: 19.4 (ref 7–25)
CALCIUM SPEC-SCNC: 9.3 MG/DL (ref 8.6–10.5)
CHLORIDE SERPL-SCNC: 104 MMOL/L (ref 98–107)
CHOLEST SERPL-MCNC: 189 MG/DL (ref 0–200)
CO2 SERPL-SCNC: 25.9 MMOL/L (ref 22–29)
CREAT SERPL-MCNC: 0.93 MG/DL (ref 0.57–1)
EGFRCR SERPLBLD CKD-EPI 2021: 67.1 ML/MIN/1.73
GLOBULIN UR ELPH-MCNC: 2.4 GM/DL
GLUCOSE SERPL-MCNC: 96 MG/DL (ref 65–99)
HDLC SERPL-MCNC: 49 MG/DL (ref 40–60)
LDLC SERPL CALC-MCNC: 122 MG/DL (ref 0–100)
LDLC/HDLC SERPL: 2.46 {RATIO}
POTASSIUM SERPL-SCNC: 3.9 MMOL/L (ref 3.5–5.2)
PROT SERPL-MCNC: 7 G/DL (ref 6–8.5)
SODIUM SERPL-SCNC: 140 MMOL/L (ref 136–145)
TRIGL SERPL-MCNC: 98 MG/DL (ref 0–150)
VLDLC SERPL-MCNC: 18 MG/DL (ref 5–40)

## 2022-11-11 PROCEDURE — 90792 PSYCH DIAG EVAL W/MED SRVCS: CPT | Performed by: NURSE PRACTITIONER

## 2022-11-11 PROCEDURE — 36415 COLL VENOUS BLD VENIPUNCTURE: CPT

## 2022-11-11 PROCEDURE — 80053 COMPREHEN METABOLIC PANEL: CPT

## 2022-11-11 PROCEDURE — 80061 LIPID PANEL: CPT

## 2022-11-11 PROCEDURE — 96372 THER/PROPH/DIAG INJ SC/IM: CPT | Performed by: FAMILY MEDICINE

## 2022-11-11 RX ORDER — TRAMADOL HYDROCHLORIDE 50 MG/1
50 TABLET ORAL SEE ADMIN INSTRUCTIONS
COMMUNITY
Start: 2022-10-19 | End: 2022-11-11

## 2022-11-11 RX ORDER — BUSPIRONE HYDROCHLORIDE 5 MG/1
TABLET ORAL
COMMUNITY
End: 2022-07-28

## 2022-11-11 RX ORDER — GABAPENTIN 100 MG/1
CAPSULE ORAL
COMMUNITY
Start: 2022-10-19 | End: 2022-11-11

## 2022-11-11 RX ORDER — MELOXICAM 7.5 MG/1
7.5 TABLET ORAL DAILY
COMMUNITY
Start: 2022-10-19 | End: 2022-11-11

## 2022-11-11 RX ORDER — BUSPIRONE HYDROCHLORIDE 10 MG/1
10 TABLET ORAL 3 TIMES DAILY
Qty: 90 TABLET | Refills: 2 | Status: SHIPPED | OUTPATIENT
Start: 2022-11-11 | End: 2023-02-07 | Stop reason: SDUPTHER

## 2022-11-11 NOTE — PROGRESS NOTES
"Subjective   Jaqueline Katz is a 67 y.o. female who presents today for initial evaluation     Referring Provider:  Rosalinda Irizarry MD  [address] [facility]  [address]  [place] [address]    Chief Complaint:  Anxiety and Depression    History of Present Illness:  Patient presents today in office with a history of anxiety and depression for which treatment began approximately 15 yrs ago.  Patient is currently prescribed Cymbalta 60 mg twice daily and Buspar 10 mg twice daily, which have provided effectiveness.   Patient suffers from RA, OA, HYPERTENSION,GERD,IBS, and seasonal allergies.  Patient has been taking Cymbalta for 10 or more years and Buspar for one year and dose was increased this past July.     Patient has been struggling with anxiety since COVID illness 6/2022 after returning from [facility].  Previously symptoms were controlled with current regimen, though since illness the anxiety has not subsided.  \"Mostly I wake up in the middle of the night and can't go back to sleep.\" Patient would wake up feeling chest tight, denies panic attacks, also felt stomach discomfort.  Patient took some deep breaths and ends up getting up out of bed.  There are random times throughout the day of feeling chest tightness, minimal soa, denies nausea, sweating, elevated heart rate. \"Just tight\". Denies specific trigger.  \"It slowly subsides.\" Present symptoms have been present intermittently for at least 15 yrs.  Reports these episodes happen more often in the morning than throughout the day.   Upon awakening this morning at 5 am had chest tightness and stomach discomfort, \"it's like an ulcer and it aches.\"  Patient is scheduled for scope on Monday.  Patient reports eating chili last night and experienced some reflux and gave remaining to . \"It's almost like a knot, like it's not gone down, I have had a lot of problems with my stomach, one time they said I had a lazy flap, but the reflux has gotten worse as " "the years go by, sometimes I can't hardly eat anything.\"     Patient reports taking Buspar twice daily doses together all at once  Until approximately 1 month ago as others had suggested twice daily dosing. Denies taking as needed as ordered.   Denies drowsiness or dizziness.  Patient endorses to fatigue this past week and having started an additional blood pressure medication.   Patient currently taking Buspar at 7 am and between 8-9 pm. Agreeable to adding a midday dose.      Depression: Patient complains of depression. She complains of anhedonia, depressed mood and appetite changes.  Rates depression 4/10 on scale of 0-10.      Anxiety: The patient endorses significant symptoms of anxiety including: excessive anxiety and worry about a number of events or activities for more days than not and being easily fatigued which have caused impairment in important areas of daily functioning.  The patient has had symptoms of anxiety for at least 15 yrs, which have worsened over the last 5 months.  The patient rates their anxiety at a 5/10 on a 0-10 scale, with 10 being the worst.       PHQ-9 Depression Screening  PHQ-9 Total Score: 3    Little interest or pleasure in doing things? 1-->several days   Feeling down, depressed, or hopeless? 1-->several days   Trouble falling or staying asleep, or sleeping too much? 0-->not at all   Feeling tired or having little energy? 0-->not at all   Poor appetite or overeating? 1-->several days   Feeling bad about yourself - or that you are a failure or have let yourself or your family down?     Trouble concentrating on things, such as reading the newspaper or watching television? 0-->not at all   Moving or speaking so slowly that other people could have noticed? Or the opposite - being so fidgety or restless that you have been moving around a lot more than usual? 0-->not at all   Thoughts that you would be better off dead, or of hurting yourself in some way? 0-->not at all   PHQ-9 Total " Score 3     ANÍBAL-7  Feeling nervous, anxious or on edge: Several days  Not being able to stop or control worrying: Several days  Worrying too much about different things: Several days  Trouble Relaxing: Several days  Being so restless that it is hard to sit still: Not at all  Feeling afraid as if something awful might happen: Not at all  Becoming easily annoyed or irritable: Not at all  ANÍBAL 7 Total Score: 4  If you checked any problems, how difficult have these problems made it for you to do your work, take care of things at home, or get along with other people: Somewhat difficult    Past Surgical History:  Past Surgical History:   Procedure Laterality Date   • APPENDECTOMY     • CHOLECYSTECTOMY     • SHOULDER SURGERY Left        Problem List:  Patient Active Problem List   Diagnosis   • Essential hypertension   • Gastroesophageal reflux disease without esophagitis   • Primary osteoarthritis involving multiple joints   • Moderate episode of recurrent major depressive disorder (HCC)   • Generalized anxiety disorder   • Other irritable bowel syndrome   • Age-related osteoporosis without current pathological fracture   • Rheumatoid arthritis involving both hands with positive rheumatoid factor (HCC)   • Annual physical exam   • Seasonal allergic rhinitis due to pollen   • History of COVID-19       Allergy:   Allergies   Allergen Reactions   • Penicillins Hives   • Nsaids Nausea And Vomiting        Discontinued Medications:  Medications Discontinued During This Encounter   Medication Reason   • gabapentin (NEURONTIN) 100 MG capsule *Therapy completed   • meloxicam (MOBIC) 7.5 MG tablet *Therapy completed   • traMADol (ULTRAM) 50 MG tablet *Therapy completed   • busPIRone (BUSPAR) 5 MG tablet Historical Med - Therapy completed   • busPIRone (BUSPAR) 10 MG tablet Dose adjustment       Current Medications:   Current Outpatient Medications   Medication Sig Dispense Refill   • amLODIPine (NORVASC) 2.5 MG tablet Take 1 tablet  by mouth Daily. 30 tablet 5   • atenolol (TENORMIN) 25 MG tablet TAKE TWO TABLETS BY MOUTH EVERY DAY 90 tablet 1   • busPIRone (BUSPAR) 10 MG tablet Take 1 tablet by mouth 3 (Three) Times a Day for 90 days. Indications: Anxiety Disorder 90 tablet 2   • denosumab (Prolia) 60 MG/ML solution prefilled syringe syringe 1 mL Every 6 (Six) Months.     • DULoxetine (CYMBALTA) 60 MG capsule Take 1 capsule by mouth 2 (Two) Times a Day. 180 capsule 1   • esomeprazole (nexIUM) 40 MG capsule TAKE ONE CAPSULE BY MOUTH EVERY DAY 90 capsule 1   • folic acid (FOLVITE) 1 MG tablet Take 1,000 mcg by mouth Daily.     • losartan (COZAAR) 100 MG tablet Take 1 tablet by mouth Daily. 90 tablet 1   • methotrexate 2.5 MG tablet Take 8 tablets by mouth 1 (One) Time Per Week.     • montelukast (SINGULAIR) 10 MG tablet TAKE ONE TABLET BY MOUTH EVERY EVENING 90 tablet 3   • olopatadine (PATANOL) 0.1 % ophthalmic solution Patanol 0.1 % eye drops   INSTILL 1 DROP INTO AFFECTED EYE(S) BY OPHTHALMIC ROUTE 2 TIMES PER DAY AT AN INTERVAL OF 6 TO 8 HOURS       Current Facility-Administered Medications   Medication Dose Route Frequency Provider Last Rate Last Admin   • denosumab (PROLIA) syringe 60 mg  60 mg Subcutaneous Q6 Months Rosalinda Irizarry MD   60 mg at 11/11/22 1018       Past Medical History:  Past Medical History:   Diagnosis Date   • Age-related osteoporosis without current pathological fracture    • Allergic rhinitis due to pollen    • Anxiety    • Cancer (HCC)    • Contact with and (suspected) exposure to other viral communicable diseases    • Depression    • Essential (primary) hypertension    • Gastro-esophageal reflux disease without esophagitis    • Mixed irritable bowel syndrome    • Nausea with vomiting, unspecified    • Osteoporosis     ON PROLIA   • Primary osteoarthritis of both ankles    • Primary osteoarthritis of both feet    • Primary osteoarthritis of both hands        Past Psychiatric History:  Began  Treatment:approximately 15 years after quitting smoking  Diagnoses:Depression and Anxiety  Psychiatrist:Denies  Therapist:Denies  Admission History:Denies  Medication Trials:Hydroxyzine (Vistaril) 25 mg daily-ineffective; Celexa many years ago  Self Harm: Denies  Suicide Attempts:Denies   Psychosis, Anxiety, Depression: Denies    Substance Abuse History:   Types:Denies all, including illicit  Withdrawal Symptoms:Denies  Longest Period Sober:Not Applicable   AA: Not applicable     Social History:  Martial Status:  Employed:No 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, 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  Kids:Yes or If so, how many 3  House:Lives in a house   History: Denies  Access to Guns:  No    Social History     Socioeconomic History   • Marital status:    • Number of children: 3   Tobacco Use   • Smoking status: Former     Types: Cigarettes     Quit date:      Years since quittin.8   • Smokeless tobacco: Never   Vaping Use   • Vaping Use: Never used   Substance and Sexual Activity   • Drug use: Never   • Sexual activity: Not Currently     Partners: Male       Family History:   Suicide Attempts: Denies  Suicide Completions:Denies      Family History   Problem Relation Age of Onset   • Heart failure Mother    • Hyperthyroidism Mother    • Heart attack Father    • Diabetes type II Sister    • Hyperthyroidism Sister    • Alcohol abuse Brother    • Hyperthyroidism Nephew    • Aortic stenosis Other    • Stroke Other    • OCD Son        Developmental History:   Born: [place]  Siblings:10 (1 )  Childhood: Denies Abuse  High School:Completed  College:Denies    Mental Status Exam:   Hygiene:   good  Cooperation:  Cooperative  Eye Contact:  Good  Psychomotor Behavior:  Appropriate  Affect:  Appropriate  Mood: anxious  Speech:  Normal  Thought Process:  Goal directed  Thought Content:  Mood congruent  Suicidal:  None  Homicidal:   "None  Hallucinations:  None  Delusion:  None  Memory:  Intact  Orientation:  Grossly intact  Reliability:  good  Insight:  Good  Judgement:  Good  Impulse Control:  Good  Physical/Medical Issues:  Yes HTN,RA,OA,GERD,IBS,Seasonal Allergies     Review of Systems:  Review of Systems   Constitutional: Positive for fatigue.   HENT: Negative for trouble swallowing.    Respiratory: Negative for cough and shortness of breath.    Cardiovascular: Negative for chest pain.        Chest tightness accompanied by stomach discomfort   Gastrointestinal: Positive for abdominal pain.        Discomfort, acid reflux   Genitourinary: Negative for difficulty urinating.   Musculoskeletal: Positive for arthralgias.        RA and OA   Neurological: Negative for dizziness, tremors, seizures, weakness and light-headedness.   Psychiatric/Behavioral: Negative for confusion, decreased concentration, hallucinations, self-injury, sleep disturbance and suicidal ideas. The patient is nervous/anxious. The patient is not hyperactive.          Physical Exam:  Physical Exam  Psychiatric:         Attention and Perception: Attention and perception normal.         Mood and Affect: Affect normal. Mood is anxious.         Speech: Speech normal.         Behavior: Behavior normal. Behavior is cooperative.         Thought Content: Thought content normal. Thought content does not include suicidal ideation. Thought content does not include suicidal plan.         Cognition and Memory: Cognition and memory normal.         Judgment: Judgment normal.         Vital Signs:   /82   Pulse 83   Ht 149.9 cm (59.02\")   Wt 50.6 kg (111 lb 9.6 oz)   BMI 22.53 kg/m²      Lab Results:   Lab on 10/17/2022   Component Date Value Ref Range Status   • Creatinine 10/17/2022 0.80  0.57 - 1.00 mg/dL Final   • eGFR 10/17/2022 80.4  >60.0 mL/min/1.73 Final    National Kidney Foundation and American Society of Nephrology (ASN) Task Force recommended calculation based on the " Chronic Kidney Disease Epidemiology Collaboration (CKD-EPI) equation refit without adjustment for race.   • Sed Rate 10/17/2022 2  0 - 30 mm/hr Final   • C-Reactive Protein 10/17/2022 <0.30  0.00 - 0.50 mg/dL Final   • Total Protein 10/17/2022 6.3  6.0 - 8.5 g/dL Final   • Albumin 10/17/2022 3.90  3.50 - 5.20 g/dL Final   • ALT (SGPT) 10/17/2022 14  1 - 33 U/L Final   • AST (SGOT) 10/17/2022 17  1 - 32 U/L Final   • Alkaline Phosphatase 10/17/2022 50  39 - 117 U/L Final   • Total Bilirubin 10/17/2022 <0.2  0.0 - 1.2 mg/dL Final   • Bilirubin, Direct 10/17/2022 <0.2  0.0 - 0.3 mg/dL Final   • Bilirubin, Indirect 10/17/2022    Final    Unable to calculate   • WBC 10/17/2022 5.72  3.40 - 10.80 10*3/mm3 Final   • RBC 10/17/2022 4.06  3.77 - 5.28 10*6/mm3 Final   • Hemoglobin 10/17/2022 12.3  12.0 - 15.9 g/dL Final   • Hematocrit 10/17/2022 38.1  34.0 - 46.6 % Final   • MCV 10/17/2022 93.8  79.0 - 97.0 fL Final   • MCH 10/17/2022 30.3  26.6 - 33.0 pg Final   • MCHC 10/17/2022 32.3  31.5 - 35.7 g/dL Final   • RDW 10/17/2022 13.7  12.3 - 15.4 % Final   • RDW-SD 10/17/2022 47.1  37.0 - 54.0 fl Final   • MPV 10/17/2022 9.8  6.0 - 12.0 fL Final   • Platelets 10/17/2022 319  140 - 450 10*3/mm3 Final   • Neutrophil % 10/17/2022 67.0  42.7 - 76.0 % Final   • Lymphocyte % 10/17/2022 19.6  19.6 - 45.3 % Final   • Monocyte % 10/17/2022 9.4  5.0 - 12.0 % Final   • Eosinophil % 10/17/2022 2.6  0.3 - 6.2 % Final   • Basophil % 10/17/2022 1.2  0.0 - 1.5 % Final   • Immature Grans % 10/17/2022 0.2  0.0 - 0.5 % Final   • Neutrophils, Absolute 10/17/2022 3.83  1.70 - 7.00 10*3/mm3 Final   • Lymphocytes, Absolute 10/17/2022 1.12  0.70 - 3.10 10*3/mm3 Final   • Monocytes, Absolute 10/17/2022 0.54  0.10 - 0.90 10*3/mm3 Final   • Eosinophils, Absolute 10/17/2022 0.15  0.00 - 0.40 10*3/mm3 Final   • Basophils, Absolute 10/17/2022 0.07  0.00 - 0.20 10*3/mm3 Final   • Immature Grans, Absolute 10/17/2022 0.01  0.00 - 0.05 10*3/mm3 Final   Lab on  07/28/2022   Component Date Value Ref Range Status   • Creatinine 07/28/2022 0.79  0.57 - 1.00 mg/dL Final   • eGFR 07/28/2022 82.1  >60.0 mL/min/1.73 Final    National Kidney Foundation and American Society of Nephrology (ASN) Task Force recommended calculation based on the Chronic Kidney Disease Epidemiology Collaboration (CKD-EPI) equation refit without adjustment for race.   • Sed Rate 07/28/2022 <1  0 - 30 mm/hr Final   • C-Reactive Protein 07/28/2022 <0.30  0.00 - 0.50 mg/dL Final   • Total Protein 07/28/2022 6.4  6.0 - 8.5 g/dL Final   • Albumin 07/28/2022 4.20  3.50 - 5.20 g/dL Final   • ALT (SGPT) 07/28/2022 18  1 - 33 U/L Final   • AST (SGOT) 07/28/2022 19  1 - 32 U/L Final   • Alkaline Phosphatase 07/28/2022 48  39 - 117 U/L Final   • Total Bilirubin 07/28/2022 0.3  0.0 - 1.2 mg/dL Final   • Bilirubin, Direct 07/28/2022 <0.2  0.0 - 0.3 mg/dL Final   • Bilirubin, Indirect 07/28/2022    Final    Unable to calculate   • WBC 07/28/2022 7.22  3.40 - 10.80 10*3/mm3 Final   • RBC 07/28/2022 4.16  3.77 - 5.28 10*6/mm3 Final   • Hemoglobin 07/28/2022 12.5  12.0 - 15.9 g/dL Final   • Hematocrit 07/28/2022 39.5  34.0 - 46.6 % Final   • MCV 07/28/2022 95.0  79.0 - 97.0 fL Final   • MCH 07/28/2022 30.0  26.6 - 33.0 pg Final   • MCHC 07/28/2022 31.6  31.5 - 35.7 g/dL Final   • RDW 07/28/2022 14.7  12.3 - 15.4 % Final   • RDW-SD 07/28/2022 51.3  37.0 - 54.0 fl Final   • MPV 07/28/2022 9.3  6.0 - 12.0 fL Final   • Platelets 07/28/2022 272  140 - 450 10*3/mm3 Final   • Neutrophil % 07/28/2022 65.2  42.7 - 76.0 % Final   • Lymphocyte % 07/28/2022 22.3  19.6 - 45.3 % Final   • Monocyte % 07/28/2022 9.0  5.0 - 12.0 % Final   • Eosinophil % 07/28/2022 1.8  0.3 - 6.2 % Final   • Basophil % 07/28/2022 1.4  0.0 - 1.5 % Final   • Immature Grans % 07/28/2022 0.3  0.0 - 0.5 % Final   • Neutrophils, Absolute 07/28/2022 4.71  1.70 - 7.00 10*3/mm3 Final   • Lymphocytes, Absolute 07/28/2022 1.61  0.70 - 3.10 10*3/mm3 Final   •  Monocytes, Absolute 07/28/2022 0.65  0.10 - 0.90 10*3/mm3 Final   • Eosinophils, Absolute 07/28/2022 0.13  0.00 - 0.40 10*3/mm3 Final   • Basophils, Absolute 07/28/2022 0.10  0.00 - 0.20 10*3/mm3 Final   • Immature Grans, Absolute 07/28/2022 0.02  0.00 - 0.05 10*3/mm3 Final   Lab on 05/19/2022   Component Date Value Ref Range Status   • Sed Rate 05/19/2022 1  0 - 30 mm/hr Final   • C-Reactive Protein 05/19/2022 <0.30  0.00 - 0.50 mg/dL Final   • WBC 05/19/2022 8.23  3.40 - 10.80 10*3/mm3 Final   • RBC 05/19/2022 3.94  3.77 - 5.28 10*6/mm3 Final   • Hemoglobin 05/19/2022 12.0  12.0 - 15.9 g/dL Final   • Hematocrit 05/19/2022 37.7  34.0 - 46.6 % Final   • MCV 05/19/2022 95.7  79.0 - 97.0 fL Final   • MCH 05/19/2022 30.5  26.6 - 33.0 pg Final   • MCHC 05/19/2022 31.8  31.5 - 35.7 g/dL Final   • RDW 05/19/2022 14.2  12.3 - 15.4 % Final   • RDW-SD 05/19/2022 48.9  37.0 - 54.0 fl Final   • MPV 05/19/2022 9.2  6.0 - 12.0 fL Final   • Platelets 05/19/2022 312  140 - 450 10*3/mm3 Final   • Neutrophil % 05/19/2022 67.5  42.7 - 76.0 % Final   • Lymphocyte % 05/19/2022 21.5  19.6 - 45.3 % Final   • Monocyte % 05/19/2022 8.9  5.0 - 12.0 % Final   • Eosinophil % 05/19/2022 1.0  0.3 - 6.2 % Final   • Basophil % 05/19/2022 0.7  0.0 - 1.5 % Final   • Immature Grans % 05/19/2022 0.4  0.0 - 0.5 % Final   • Neutrophils, Absolute 05/19/2022 5.56  1.70 - 7.00 10*3/mm3 Final   • Lymphocytes, Absolute 05/19/2022 1.77  0.70 - 3.10 10*3/mm3 Final   • Monocytes, Absolute 05/19/2022 0.73  0.10 - 0.90 10*3/mm3 Final   • Eosinophils, Absolute 05/19/2022 0.08  0.00 - 0.40 10*3/mm3 Final   • Basophils, Absolute 05/19/2022 0.06  0.00 - 0.20 10*3/mm3 Final   • Immature Grans, Absolute 05/19/2022 0.03  0.00 - 0.05 10*3/mm3 Final       EKG Results:  No orders to display       Imaging Results:  No Images in the past 120 days found..      Assessment & Plan   Diagnoses and all orders for this visit:    1. Generalized anxiety disorder (Primary)  -      busPIRone (BUSPAR) 10 MG tablet; Take 1 tablet by mouth 3 (Three) Times a Day for 90 days. Indications: Anxiety Disorder  Dispense: 90 tablet; Refill: 2    2. Mild episode of recurrent major depressive disorder (HCC)        Visit Diagnoses:    ICD-10-CM ICD-9-CM   1. Generalized anxiety disorder  F41.1 300.02   2. Mild episode of recurrent major depressive disorder (HCC)  F33.0 296.31       PLAN:  1. Safety: No acute safety concerns  2. Therapy: Declines  3. Risk Assessment: Risk of self-harm acutely is low.  Risk factors include anxiety disorder, mood disorder, and recent psychosocial stressors (pandemic). Protective factors include no family history, denies access to guns/weapons, no present SI, no history of suicide attempts or self-harm in the past, minimal AODA, healthcare seeking, future orientation, willingness to engage in care.  Risk of self-harm chronically is also low, but could be further elevated in the event of treatment noncompliance and/or AODA.  4. Meds: Continue Cymbalta 60 mg by mouth twice daily to target depression and anxiety. Discussed all risks, benefits, alternatives, and side effects of Duloxetine including but not limited to GI upset, sexual dysfunction, bleeding risk, seizure risk, weight loss, insomnia, diaphoresis, drowsiness, headache, dizziness, fatigue, activation of chun or hypomania, increased fragility fracture risk, hyponatremia, increased BP, hepatotoxicity, ocular effects, withdrawal syndrome following abrupt discontinuation, serotonin syndrome, and activation of suicidal ideation and behavior.  Discussed the need for patient to immediately call the office for any new or worsening symptoms, such as worsening depression; feeling nervous or restless; suicidal thoughts or actions; or other changes in mood or behavior, and all other concerns. Patient educated on medication compliance and the risks of suddenly stopping this medication or missing doses. Patient verbalized  understanding and is agreeable to taking Duloxetine. Addressed all questions and concerns.   Increase Buspar from 10 mg by mouth twice daily TO 3 times daily  to target anxiety. Risks, benefits, alternatives discussed with patient including nausea, GI upset, dizziness, mild sedation, and falls risk.  After discussion of these risks and benefits, the patient voiced understanding and agreed to proceed. Instructed to take at 7am, between 12-3 pm, and at bedtime 8-9 pm, to not take 2nd dose later than 3 pm.   5. Labs: n/a      Patient presentation seems most consistent with anxiety and depression.  Suspect unrelieved anxiety symptoms are GI related based on assessment today vs dosing of Buspar as patient reported only taking Buspar once daily in the morning until one month ago.  Patient would take both morning and evening dose all at one in the morning.  Patient tolerating medications well without reported side effects.  Symptoms are managed well with Cymbalta.   Patient to contact provider if symptoms worsen or fail to improve.        Patient screened positive for depression based on a PHQ-9 score of 3 on 11/11/2022. Follow-up recommendations include: Prescribed antidepressant medication treatment and Suicide Risk Assessment performed.       TREATMENT PLAN/GOALS: Continue supportive psychotherapy efforts and medications as indicated. Treatment and medication options discussed during today's visit. Patient acknowledged and verbally consented to continue with current treatment plan and was educated on the importance of compliance with treatment and follow-up appointments.    MEDICATION ISSUES:  ANNAMARIE reviewed as expected.  Discussed medication options and treatment plan of prescribed medication as well as the risks, benefits, and side effects including potential falls, possible impaired driving and metabolic adversities among others. Patient is agreeable to call the office with any worsening of symptoms or onset of side  effects. Patient is agreeable to call 911 or go to the nearest ER should he/she begin having SI/HI. No medication side effects or related complaints today.     MEDS ORDERED DURING VISIT:  New Medications Ordered This Visit   Medications   • busPIRone (BUSPAR) 10 MG tablet     Sig: Take 1 tablet by mouth 3 (Three) Times a Day for 90 days. Indications: Anxiety Disorder     Dispense:  90 tablet     Refill:  2     Dose adjustment, please remove prior orders       Return in about 8 weeks (around 1/6/2023) for Next scheduled follow up.         I spent 53 minutes caring for Jaqueline on this date of service. This time includes time spent by me in the following activities: preparing for the visit, reviewing tests, obtaining and/or reviewing a separately obtained history, performing a medically appropriate examination and/or evaluation, counseling and educating the patient/family/caregiver, ordering medications, tests, or procedures, referring and communicating with other health care professionals, documenting information in the medical record and care coordination.      This document has been electronically signed by NATE Moya  November 11, 2022 11:34 EST      Part of this note may be an electronic transcription/translation of spoken language to printed text using the Dragon Dictation System.

## 2022-11-11 NOTE — PATIENT INSTRUCTIONS
"1.  Please return to clinic at your next scheduled visit.  Contact the [facility] ([phone]) or **Mercedes, Medical Assistant at [place] Office directly at [phone] at least 24 hours prior in the event you need to cancel.**    2. Should you want to get in touch with your provider, NATE Moya, please contact MY Medical Assistant, Mercedes, directly at [phone].  Recommend saving Mercedes's direct number in phone as this is the PREFERRED & EASIEST way to get in contact with your provider.  Please leave a voice mail if you do not get an answer and she will return your call within 24 hrs. You will NOT be able to contact provider on [facility], as Behavioral Health Providers are restricted. YOU MUST CALL [phone]    If you need to speak with the on call provider after hours or on weekends, please Contact the [facility] ([phone]) and staff will be able to page the provider on call directly.        3, MEDICATION REFILLS:  PLEASE CALL THE PHARMACY TO REQUEST ALL MEDICATION REFILLS TO ENSURE YOU ARE RECEIVING YOUR MEDICATIONS IN A TIMELY MANNER.    IF YOU USE AN AUTOMATED SERVICE AT THE PHARMACY FOR REFILLS AND ARE TOLD THERE ARE \"NO REFILLS REMAINING\"   PLEASE CALL THE PHARMACY & SPEAK TO A LIVE PERSON TO VERIFY IT IS THE MOST UP TO DATE PRESCRIPTION ON FILE.    All new prescriptions will have a different number, therefore, if you were given refills for a medication today or at last visit it will not have the same number as the previous prescription.       4.  In the event you have personal crisis, contact the following crisis numbers: Suicide Prevention Hotline 1-655.851.3944 or *988, [facility] Helpline 4-241-224-AYALA; [facility] Emergency Room [phone]; text HELLO to 887585; or 877.      5. We would appreciate your feedback, please scan the QRS code on the back of your appointment card (or see below) and complete a brief survey.  [place] location is still not available, so " "please click \"[place]\" location.  Thank you      SPECIFIC RECOMMENDATIONS:     1.      Medications discussed at this encounter:                   - Increase Buspar from 10 mg twice daily TO 3 TIMES DAILY (7am, in between 12-3pm, and at bedtime 8-9 pm) If you forget to take the midday dose, do not take any later than 3 pm, as you may experience increased drowsiness      2.      Psychotherapy recommendations: Continue with current therapist. Declined     3.     Return to clinic: 6 weeks    Please arrive at least 15 minutes before your scheduled appointment time to complete check in process.      IF you are scheduled for a [employer] VIDEO visit, PLEASE ANSWER YOUR PHONE WHEN OFFICE CALLS PRIOR TO VISIT TO COMPLETE THE CHECK IN PROCESS, EVEN IF THE E-CHECK IN WAS COMPLETED.     If you would like to log on to [employer] and complete the \"E-Check IN\" prior to your visit, please do so, this will speed up the check in process.  If you are due for questionnaires, you will find those on [employer] as well, please try to complete prior to your scheduled appointment.          "

## 2022-11-16 ENCOUNTER — PATIENT ROUNDING (BHMG ONLY) (OUTPATIENT)
Dept: PSYCHIATRY | Facility: CLINIC | Age: 67
End: 2022-11-16

## 2022-11-16 NOTE — PROGRESS NOTES
November 16, 2022    Hello, may I speak with Jaqueline Katz?    My name is Mercedes Hong      I am  with [facility]  [facility]  [address]  [place] [address] [phone].    Before we get started may I verify your date of birth? 1955    I am calling to officially welcome you to our practice and ask about your recent visit. Is this a good time to talk? yes    Tell me about your visit with us. What things went well?  Everything       We're always looking for ways to make our patients' experiences even better. Do you have recommendations on ways we may improve?  no    Overall were you satisfied with your first visit to our practice? yes       I appreciate you taking the time to speak with me today. Is there anything else I can do for you? 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

## 2022-11-22 DIAGNOSIS — I10 ESSENTIAL HYPERTENSION: ICD-10-CM

## 2022-11-22 RX ORDER — ATENOLOL 25 MG/1
TABLET ORAL
Qty: 90 TABLET | Refills: 1 | Status: SHIPPED | OUTPATIENT
Start: 2022-11-22 | End: 2023-02-09

## 2022-11-29 DIAGNOSIS — F33.1 MODERATE EPISODE OF RECURRENT MAJOR DEPRESSIVE DISORDER: ICD-10-CM

## 2022-11-29 DIAGNOSIS — F41.1 GENERALIZED ANXIETY DISORDER: ICD-10-CM

## 2022-11-29 RX ORDER — DULOXETIN HYDROCHLORIDE 60 MG/1
CAPSULE, DELAYED RELEASE ORAL
Qty: 180 CAPSULE | Refills: 1 | Status: SHIPPED | OUTPATIENT
Start: 2022-11-29

## 2022-11-30 DIAGNOSIS — Z12.31 SCREENING MAMMOGRAM, ENCOUNTER FOR: ICD-10-CM

## 2022-12-02 DIAGNOSIS — I10 ESSENTIAL HYPERTENSION: ICD-10-CM

## 2022-12-02 RX ORDER — LOSARTAN POTASSIUM 100 MG/1
TABLET ORAL
Qty: 90 TABLET | Refills: 1 | Status: SHIPPED | OUTPATIENT
Start: 2022-12-02

## 2022-12-13 ENCOUNTER — LAB (OUTPATIENT)
Dept: LAB | Facility: HOSPITAL | Age: 67
End: 2022-12-13

## 2022-12-13 DIAGNOSIS — R53.83 OTHER FATIGUE: ICD-10-CM

## 2022-12-13 DIAGNOSIS — Z79.899 ENCOUNTER FOR LONG-TERM (CURRENT) USE OF OTHER MEDICATIONS: ICD-10-CM

## 2022-12-13 DIAGNOSIS — M06.9 RHEUMATOID ARTHRITIS, INVOLVING UNSPECIFIED SITE, UNSPECIFIED WHETHER RHEUMATOID FACTOR PRESENT: ICD-10-CM

## 2022-12-13 LAB
ALBUMIN SERPL-MCNC: 4.4 G/DL (ref 3.5–5.2)
ALP SERPL-CCNC: 46 U/L (ref 39–117)
ALT SERPL W P-5'-P-CCNC: 13 U/L (ref 1–33)
AST SERPL-CCNC: 17 U/L (ref 1–32)
BASOPHILS # BLD AUTO: 0.07 10*3/MM3 (ref 0–0.2)
BASOPHILS NFR BLD AUTO: 1.1 % (ref 0–1.5)
BILIRUB CONJ SERPL-MCNC: <0.2 MG/DL (ref 0–0.3)
BILIRUB INDIRECT SERPL-MCNC: NORMAL MG/DL
BILIRUB SERPL-MCNC: 0.5 MG/DL (ref 0–1.2)
CREAT SERPL-MCNC: 0.87 MG/DL (ref 0.57–1)
CRP SERPL-MCNC: <0.3 MG/DL (ref 0–0.5)
DEPRECATED RDW RBC AUTO: 45.4 FL (ref 37–54)
EGFRCR SERPLBLD CKD-EPI 2021: 72.7 ML/MIN/1.73
EOSINOPHIL # BLD AUTO: 0.11 10*3/MM3 (ref 0–0.4)
EOSINOPHIL NFR BLD AUTO: 1.7 % (ref 0.3–6.2)
ERYTHROCYTE [DISTWIDTH] IN BLOOD BY AUTOMATED COUNT: 13.7 % (ref 12.3–15.4)
HCT VFR BLD AUTO: 38 % (ref 34–46.6)
HGB BLD-MCNC: 12.5 G/DL (ref 12–15.9)
IMM GRANULOCYTES # BLD AUTO: 0 10*3/MM3 (ref 0–0.05)
IMM GRANULOCYTES NFR BLD AUTO: 0 % (ref 0–0.5)
LYMPHOCYTES # BLD AUTO: 1.48 10*3/MM3 (ref 0.7–3.1)
LYMPHOCYTES NFR BLD AUTO: 23.2 % (ref 19.6–45.3)
MCH RBC QN AUTO: 30.3 PG (ref 26.6–33)
MCHC RBC AUTO-ENTMCNC: 32.9 G/DL (ref 31.5–35.7)
MCV RBC AUTO: 92 FL (ref 79–97)
MONOCYTES # BLD AUTO: 0.57 10*3/MM3 (ref 0.1–0.9)
MONOCYTES NFR BLD AUTO: 8.9 % (ref 5–12)
NEUTROPHILS NFR BLD AUTO: 4.15 10*3/MM3 (ref 1.7–7)
NEUTROPHILS NFR BLD AUTO: 65.1 % (ref 42.7–76)
PLATELET # BLD AUTO: 278 10*3/MM3 (ref 140–450)
PMV BLD AUTO: 8.9 FL (ref 6–12)
PROT SERPL-MCNC: 6.5 G/DL (ref 6–8.5)
RBC # BLD AUTO: 4.13 10*6/MM3 (ref 3.77–5.28)
WBC NRBC COR # BLD: 6.38 10*3/MM3 (ref 3.4–10.8)

## 2022-12-13 PROCEDURE — 36415 COLL VENOUS BLD VENIPUNCTURE: CPT

## 2022-12-13 PROCEDURE — 80076 HEPATIC FUNCTION PANEL: CPT

## 2022-12-13 PROCEDURE — 86140 C-REACTIVE PROTEIN: CPT

## 2022-12-13 PROCEDURE — 85025 COMPLETE CBC W/AUTO DIFF WBC: CPT

## 2022-12-13 PROCEDURE — 85652 RBC SED RATE AUTOMATED: CPT

## 2022-12-13 PROCEDURE — 82565 ASSAY OF CREATININE: CPT

## 2022-12-14 LAB — ERYTHROCYTE [SEDIMENTATION RATE] IN BLOOD: <1 MM/HR (ref 0–30)

## 2023-01-06 ENCOUNTER — OFFICE VISIT (OUTPATIENT)
Dept: BEHAVIORAL HEALTH | Facility: CLINIC | Age: 68
End: 2023-01-06
Payer: MEDICARE

## 2023-01-06 VITALS
BODY MASS INDEX: 22.9 KG/M2 | HEIGHT: 59 IN | DIASTOLIC BLOOD PRESSURE: 74 MMHG | SYSTOLIC BLOOD PRESSURE: 132 MMHG | HEART RATE: 78 BPM | WEIGHT: 113.6 LBS

## 2023-01-06 DIAGNOSIS — F41.1 GENERALIZED ANXIETY DISORDER: ICD-10-CM

## 2023-01-06 DIAGNOSIS — I10 ESSENTIAL HYPERTENSION: ICD-10-CM

## 2023-01-06 DIAGNOSIS — F33.0 MILD EPISODE OF RECURRENT MAJOR DEPRESSIVE DISORDER: Primary | ICD-10-CM

## 2023-01-06 PROCEDURE — 99213 OFFICE O/P EST LOW 20 MIN: CPT | Performed by: NURSE PRACTITIONER

## 2023-01-06 RX ORDER — AMLODIPINE BESYLATE 2.5 MG/1
2.5 TABLET ORAL DAILY
Qty: 90 TABLET | Refills: 0 | Status: SHIPPED | OUTPATIENT
Start: 2023-01-06

## 2023-01-06 RX ORDER — DULOXETIN HYDROCHLORIDE 60 MG/1
60 CAPSULE, DELAYED RELEASE ORAL
COMMUNITY
End: 2023-01-06

## 2023-01-06 RX ORDER — AMLODIPINE BESYLATE 5 MG/1
2.5 TABLET ORAL
COMMUNITY
End: 2023-01-06

## 2023-01-06 RX ORDER — LOSARTAN POTASSIUM 100 MG/1
100 TABLET ORAL
COMMUNITY
End: 2023-01-06

## 2023-01-06 RX ORDER — BUSPIRONE HYDROCHLORIDE 5 MG/1
5 TABLET ORAL
COMMUNITY
End: 2023-01-06

## 2023-01-06 NOTE — PROGRESS NOTES
Subjective   Jaqueline Katz is a 67 y.o. female who presents today for follow up    Referring Provider:  Rosalinda Irizarry MD  [id number] [facility]  [address]  [place] [address]    Chief Complaint:  Anxiety and Depression, medication check    History of Present Illness:    1/6/23:  Patient presents today in office,  Buspar was increased from 10 mg twice daily to 3 times daily with instructions to take at 7am, between 12-3 pm, and at bedtime 8-9 pm, to not take 2nd dose later than 3 pm.  Patient reports having some days of missing midday dose.  Patient tends to remember if placed in purse, otherwise will forget when out of house.  Admits to adherence to twice daily.      Patient reports anxiety has improved, \"I haven't had any episodes.\"   In regards to depression, patient reports feeling better.     Patient reports having scope done and was told of acid reflux and no longer showing signs of Herron's esophagus.  Patient has adjusted diet, monitoring carb intake, though with the holidays has experienced more abdominal bloating.   Patient believes Methotrexate is causing nausea and had discussed with ordering provider in the past.     Patient reports she will be going to [place] in Jan. With spouse and again in Feb. With sister.  \"I have a lot to look forward to.\"     11/11/22:  INITIAL EVAL  Patient presents today in office with a history of anxiety and depression for which treatment began approximately 15 yrs ago.  Patient is currently prescribed Cymbalta 60 mg twice daily and Buspar 10 mg twice daily, which have provided effectiveness.   Patient suffers from RA, OA, HYPERTENSION,GERD,IBS, and seasonal allergies.  Patient has been taking Cymbalta for 10 or more years and Buspar for one year and dose was increased this past July.     Patient has been struggling with anxiety since COVID illness 6/2022 after returning from [facility].  Previously symptoms were controlled with current regimen, though since  illness the anxiety has not subsided.  \"Mostly I wake up in the middle of the night and can't go back to sleep.\" Patient would wake up feeling chest tight, denies panic attacks, also felt stomach discomfort.  Patient took some deep breaths and ends up getting up out of bed.  There are random times throughout the day of feeling chest tightness, minimal soa, denies nausea, sweating, elevated heart rate. \"Just tight\". Denies specific trigger.  \"It slowly subsides.\" Present symptoms have been present intermittently for at least 15 yrs.  Reports these episodes happen more often in the morning than throughout the day.   Upon awakening this morning at 5 am had chest tightness and stomach discomfort, \"it's like an ulcer and it aches.\"  Patient is scheduled for scope on Monday.  Patient reports eating chili last night and experienced some reflux and gave remaining to . \"It's almost like a knot, like it's not gone down, I have had a lot of problems with my stomach, one time they said I had a lazy flap, but the reflux has gotten worse as the years go by, sometimes I can't hardly eat anything.\"     Patient reports taking Buspar twice daily doses together all at once  Until approximately 1 month ago as others had suggested twice daily dosing. Denies taking as needed as ordered.   Denies drowsiness or dizziness.  Patient endorses to fatigue this past week and having started an additional blood pressure medication.   Patient currently taking Buspar at 7 am and between 8-9 pm. Agreeable to adding a midday dose.      Depression: Patient complains of depression. She complains of anhedonia, depressed mood and appetite changes.  Rates depression 4/10 on scale of 0-10.      Anxiety: The patient endorses significant symptoms of anxiety including: excessive anxiety and worry about a number of events or activities for more days than not and being easily fatigued which have caused impairment in important areas of daily functioning.   The patient has had symptoms of anxiety for at least 15 yrs, which have worsened over the last 5 months.  The patient rates their anxiety at a 5/10 on a 0-10 scale, with 10 being the worst.       PHQ-9 Depression Screening  PHQ-9 Total Score:   11/11/2022 3 , reassess 04/2023    Little interest or pleasure in doing things?     Feeling down, depressed, or hopeless?     Trouble falling or staying asleep, or sleeping too much?     Feeling tired or having little energy?     Poor appetite or overeating?     Feeling bad about yourself - or that you are a failure or have let yourself or your family down?     Trouble concentrating on things, such as reading the newspaper or watching television?     Moving or speaking so slowly that other people could have noticed? Or the opposite - being so fidgety or restless that you have been moving around a lot more than usual?     Thoughts that you would be better off dead, or of hurting yourself in some way?     PHQ-9 Total Score       ANÍBAL-7    11/11/2022 4 , reassess 04/2023    Past Surgical History:  Past Surgical History:   Procedure Laterality Date   • APPENDECTOMY     • CHOLECYSTECTOMY     • SHOULDER SURGERY Left        Problem List:  Patient Active Problem List   Diagnosis   • Essential hypertension   • Gastroesophageal reflux disease without esophagitis   • Primary osteoarthritis involving multiple joints   • Moderate episode of recurrent major depressive disorder (HCC)   • Generalized anxiety disorder   • Other irritable bowel syndrome   • Age-related osteoporosis without current pathological fracture   • Rheumatoid arthritis involving both hands with positive rheumatoid factor (HCC)   • Annual physical exam   • Seasonal allergic rhinitis due to pollen   • History of COVID-19       Allergy:   Allergies   Allergen Reactions   • Penicillins Hives   • Nsaids Nausea And Vomiting        Discontinued Medications:  Medications Discontinued During This Encounter   Medication Reason    • amLODIPine (NORVASC) 5 MG tablet *Therapy completed   • busPIRone (BUSPAR) 5 MG tablet *Therapy completed   • DULoxetine (CYMBALTA) 60 MG capsule *Therapy completed   • losartan (COZAAR) 100 MG tablet *Therapy completed       Current Medications:   Current Outpatient Medications   Medication Sig Dispense Refill   • amLODIPine (NORVASC) 2.5 MG tablet Take 1 tablet by mouth Daily. for blood pressure 90 tablet 0   • atenolol (TENORMIN) 25 MG tablet TAKE TWO TABLETS BY MOUTH EVERY DAY 90 tablet 1   • busPIRone (BUSPAR) 10 MG tablet Take 1 tablet by mouth 3 (Three) Times a Day for 90 days. Indications: Anxiety Disorder 90 tablet 2   • denosumab (Prolia) 60 MG/ML solution prefilled syringe syringe 1 mL Every 6 (Six) Months.     • DULoxetine (CYMBALTA) 60 MG capsule TAKE ONE CAPSULE BY MOUTH TWICE DAILY 180 capsule 1   • esomeprazole (nexIUM) 40 MG capsule TAKE ONE CAPSULE BY MOUTH EVERY DAY 90 capsule 1   • folic acid (FOLVITE) 1 MG tablet Take 1,000 mcg by mouth Daily.     • losartan (COZAAR) 100 MG tablet TAKE ONE TABLET BY MOUTH EVERY DAY 90 tablet 1   • methotrexate 2.5 MG tablet Take 8 tablets by mouth 1 (One) Time Per Week.     • montelukast (SINGULAIR) 10 MG tablet TAKE ONE TABLET BY MOUTH EVERY EVENING 90 tablet 3   • olopatadine (PATANOL) 0.1 % ophthalmic solution Patanol 0.1 % eye drops   INSTILL 1 DROP INTO AFFECTED EYE(S) BY OPHTHALMIC ROUTE 2 TIMES PER DAY AT AN INTERVAL OF 6 TO 8 HOURS       Current Facility-Administered Medications   Medication Dose Route Frequency Provider Last Rate Last Admin   • denosumab (PROLIA) syringe 60 mg  60 mg Subcutaneous Q6 Months Rosalinda Irizarry MD   60 mg at 11/11/22 1018       Past Medical History:  Past Medical History:   Diagnosis Date   • Age-related osteoporosis without current pathological fracture    • Allergic rhinitis due to pollen    • Anxiety    • Cancer (HCC)    • Contact with and (suspected) exposure to other viral communicable diseases    •  Depression    • Essential (primary) hypertension    • Gastro-esophageal reflux disease without esophagitis    • Mixed irritable bowel syndrome    • Nausea with vomiting, unspecified    • Osteoporosis     ON PROLIA   • Primary osteoarthritis of both ankles    • Primary osteoarthritis of both feet    • Primary osteoarthritis of both hands        Past Psychiatric History:  Began Treatment:approximately 15 years after quitting smoking  Diagnoses:Depression and Anxiety  Psychiatrist:Denies  Therapist:Denies  Admission History:Denies  Medication Trials:Hydroxyzine (Vistaril) 25 mg daily-ineffective; Celexa many years ago  Self Harm: Denies  Suicide Attempts:Denies   Psychosis, Anxiety, Depression: Denies    Substance Abuse History:   Types:Denies all, including illicit  Withdrawal Symptoms:Denies  Longest Period Sober:Not Applicable   AA: Not applicable     Social History:  Martial Status:  Employed:No 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, 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  Kids:Yes or If so, how many 3  House:Lives in a house   History: Denies  Access to Guns:  No    Social History     Socioeconomic History   • Marital status:    • Number of children: 3   Tobacco Use   • Smoking status: Former     Types: Cigarettes     Quit date:      Years since quittin.0   • Smokeless tobacco: Never   Vaping Use   • Vaping Use: Never used   Substance and Sexual Activity   • Drug use: Never   • Sexual activity: Not Currently     Partners: Male       Family History:   Suicide Attempts: Denies  Suicide Completions:Denies      Family History   Problem Relation Age of Onset   • Heart failure Mother    • Hyperthyroidism Mother    • Heart attack Father    • Diabetes type II Sister    • Hyperthyroidism Sister    • Alcohol abuse Brother    • Hyperthyroidism Nephew    • Aortic stenosis Other    • Stroke Other    • OCD Son        Developmental History:   Born:  [place]  Siblings:10 (1 )  Childhood: Denies Abuse  High School:Completed  College:Denies    Mental Status Exam:   Hygiene:   good  Cooperation:  Cooperative  Eye Contact:  Good  Psychomotor Behavior:  Appropriate  Affect:  Appropriate  Mood: euthymic  Speech:  Normal  Thought Process:  Goal directed  Thought Content:  Mood congruent  Suicidal:  None  Homicidal:  None  Hallucinations:  None  Delusion:  None  Memory:  Intact  Orientation:  Grossly intact  Reliability:  good  Insight:  Good  Judgement:  Good  Impulse Control:  Good  Physical/Medical Issues:  Yes HTN,RA,OA,GERD,IBS,Seasonal Allergies     Review of Systems:  Review of Systems   Constitutional: Negative for fatigue.   HENT: Negative for trouble swallowing.    Respiratory: Negative for cough and shortness of breath.    Cardiovascular: Negative for chest pain.   Gastrointestinal: Positive for abdominal pain.        Discomfort, acid reflux-improvement of symptoms   Genitourinary: Negative for difficulty urinating.   Musculoskeletal: Positive for arthralgias.        RA and OA   Neurological: Negative for dizziness, tremors, seizures, weakness and light-headedness.   Psychiatric/Behavioral: Negative for confusion, decreased concentration, hallucinations, self-injury, sleep disturbance and suicidal ideas. The patient is not nervous/anxious and is not hyperactive.          Physical Exam:  Physical Exam  Psychiatric:         Attention and Perception: Attention and perception normal.         Mood and Affect: Mood and affect normal.         Speech: Speech normal.         Behavior: Behavior normal. Behavior is cooperative.         Thought Content: Thought content normal. Thought content does not include suicidal ideation. Thought content does not include suicidal plan.         Cognition and Memory: Cognition and memory normal.         Judgment: Judgment normal.         Vital Signs:   /74   Pulse 78   Ht 149.9 cm (59.02\")   Wt 51.5 kg (113 lb 9.6 oz)    BMI 22.93 kg/m²      Lab Results:   Lab on 12/13/2022   Component Date Value Ref Range Status   • Creatinine 12/13/2022 0.87  0.57 - 1.00 mg/dL Final   • eGFR 12/13/2022 72.7  >60.0 mL/min/1.73 Final    National Kidney Foundation and American Society of Nephrology (ASN) Task Force recommended calculation based on the Chronic Kidney Disease Epidemiology Collaboration (CKD-EPI) equation refit without adjustment for race.   • Sed Rate 12/13/2022 <1  0 - 30 mm/hr Final   • C-Reactive Protein 12/13/2022 <0.30  0.00 - 0.50 mg/dL Final   • Total Protein 12/13/2022 6.5  6.0 - 8.5 g/dL Final   • Albumin 12/13/2022 4.40  3.50 - 5.20 g/dL Final   • ALT (SGPT) 12/13/2022 13  1 - 33 U/L Final   • AST (SGOT) 12/13/2022 17  1 - 32 U/L Final   • Alkaline Phosphatase 12/13/2022 46  39 - 117 U/L Final   • Total Bilirubin 12/13/2022 0.5  0.0 - 1.2 mg/dL Final   • Bilirubin, Direct 12/13/2022 <0.2  0.0 - 0.3 mg/dL Final   • Bilirubin, Indirect 12/13/2022    Final    Unable to calculate   • WBC 12/13/2022 6.38  3.40 - 10.80 10*3/mm3 Final   • RBC 12/13/2022 4.13  3.77 - 5.28 10*6/mm3 Final   • Hemoglobin 12/13/2022 12.5  12.0 - 15.9 g/dL Final   • Hematocrit 12/13/2022 38.0  34.0 - 46.6 % Final   • MCV 12/13/2022 92.0  79.0 - 97.0 fL Final   • MCH 12/13/2022 30.3  26.6 - 33.0 pg Final   • MCHC 12/13/2022 32.9  31.5 - 35.7 g/dL Final   • RDW 12/13/2022 13.7  12.3 - 15.4 % Final   • RDW-SD 12/13/2022 45.4  37.0 - 54.0 fl Final   • MPV 12/13/2022 8.9  6.0 - 12.0 fL Final   • Platelets 12/13/2022 278  140 - 450 10*3/mm3 Final   • Neutrophil % 12/13/2022 65.1  42.7 - 76.0 % Final   • Lymphocyte % 12/13/2022 23.2  19.6 - 45.3 % Final   • Monocyte % 12/13/2022 8.9  5.0 - 12.0 % Final   • Eosinophil % 12/13/2022 1.7  0.3 - 6.2 % Final   • Basophil % 12/13/2022 1.1  0.0 - 1.5 % Final   • Immature Grans % 12/13/2022 0.0  0.0 - 0.5 % Final   • Neutrophils, Absolute 12/13/2022 4.15  1.70 - 7.00 10*3/mm3 Final   • Lymphocytes, Absolute 12/13/2022  1.48  0.70 - 3.10 10*3/mm3 Final   • Monocytes, Absolute 12/13/2022 0.57  0.10 - 0.90 10*3/mm3 Final   • Eosinophils, Absolute 12/13/2022 0.11  0.00 - 0.40 10*3/mm3 Final   • Basophils, Absolute 12/13/2022 0.07  0.00 - 0.20 10*3/mm3 Final   • Immature Grans, Absolute 12/13/2022 0.00  0.00 - 0.05 10*3/mm3 Final   Lab on 11/11/2022   Component Date Value Ref Range Status   • Glucose 11/11/2022 96  65 - 99 mg/dL Final   • BUN 11/11/2022 18  8 - 23 mg/dL Final   • Creatinine 11/11/2022 0.93  0.57 - 1.00 mg/dL Final   • Sodium 11/11/2022 140  136 - 145 mmol/L Final   • Potassium 11/11/2022 3.9  3.5 - 5.2 mmol/L Final   • Chloride 11/11/2022 104  98 - 107 mmol/L Final   • CO2 11/11/2022 25.9  22.0 - 29.0 mmol/L Final   • Calcium 11/11/2022 9.3  8.6 - 10.5 mg/dL Final   • Total Protein 11/11/2022 7.0  6.0 - 8.5 g/dL Final   • Albumin 11/11/2022 4.60  3.50 - 5.20 g/dL Final   • ALT (SGPT) 11/11/2022 9  1 - 33 U/L Final   • AST (SGOT) 11/11/2022 17  1 - 32 U/L Final   • Alkaline Phosphatase 11/11/2022 52  39 - 117 U/L Final   • Total Bilirubin 11/11/2022 0.3  0.0 - 1.2 mg/dL Final   • Globulin 11/11/2022 2.4  gm/dL Final   • A/G Ratio 11/11/2022 1.9  g/dL Final   • BUN/Creatinine Ratio 11/11/2022 19.4  7.0 - 25.0 Final   • Anion Gap 11/11/2022 10.1  5.0 - 15.0 mmol/L Final   • eGFR 11/11/2022 67.1  >60.0 mL/min/1.73 Final    National Kidney Foundation and American Society of Nephrology (ASN) Task Force recommended calculation based on the Chronic Kidney Disease Epidemiology Collaboration (CKD-EPI) equation refit without adjustment for race.   • Total Cholesterol 11/11/2022 189  0 - 200 mg/dL Final   • Triglycerides 11/11/2022 98  0 - 150 mg/dL Final   • HDL Cholesterol 11/11/2022 49  40 - 60 mg/dL Final   • LDL Cholesterol  11/11/2022 122 (H)  0 - 100 mg/dL Final   • VLDL Cholesterol 11/11/2022 18  5 - 40 mg/dL Final   • LDL/HDL Ratio 11/11/2022 2.46   Final   Lab on 10/17/2022   Component Date Value Ref Range Status   •  Creatinine 10/17/2022 0.80  0.57 - 1.00 mg/dL Final   • eGFR 10/17/2022 80.4  >60.0 mL/min/1.73 Final    National Kidney Foundation and American Society of Nephrology (ASN) Task Force recommended calculation based on the Chronic Kidney Disease Epidemiology Collaboration (CKD-EPI) equation refit without adjustment for race.   • Sed Rate 10/17/2022 2  0 - 30 mm/hr Final   • C-Reactive Protein 10/17/2022 <0.30  0.00 - 0.50 mg/dL Final   • Total Protein 10/17/2022 6.3  6.0 - 8.5 g/dL Final   • Albumin 10/17/2022 3.90  3.50 - 5.20 g/dL Final   • ALT (SGPT) 10/17/2022 14  1 - 33 U/L Final   • AST (SGOT) 10/17/2022 17  1 - 32 U/L Final   • Alkaline Phosphatase 10/17/2022 50  39 - 117 U/L Final   • Total Bilirubin 10/17/2022 <0.2  0.0 - 1.2 mg/dL Final   • Bilirubin, Direct 10/17/2022 <0.2  0.0 - 0.3 mg/dL Final   • Bilirubin, Indirect 10/17/2022    Final    Unable to calculate   • WBC 10/17/2022 5.72  3.40 - 10.80 10*3/mm3 Final   • RBC 10/17/2022 4.06  3.77 - 5.28 10*6/mm3 Final   • Hemoglobin 10/17/2022 12.3  12.0 - 15.9 g/dL Final   • Hematocrit 10/17/2022 38.1  34.0 - 46.6 % Final   • MCV 10/17/2022 93.8  79.0 - 97.0 fL Final   • MCH 10/17/2022 30.3  26.6 - 33.0 pg Final   • MCHC 10/17/2022 32.3  31.5 - 35.7 g/dL Final   • RDW 10/17/2022 13.7  12.3 - 15.4 % Final   • RDW-SD 10/17/2022 47.1  37.0 - 54.0 fl Final   • MPV 10/17/2022 9.8  6.0 - 12.0 fL Final   • Platelets 10/17/2022 319  140 - 450 10*3/mm3 Final   • Neutrophil % 10/17/2022 67.0  42.7 - 76.0 % Final   • Lymphocyte % 10/17/2022 19.6  19.6 - 45.3 % Final   • Monocyte % 10/17/2022 9.4  5.0 - 12.0 % Final   • Eosinophil % 10/17/2022 2.6  0.3 - 6.2 % Final   • Basophil % 10/17/2022 1.2  0.0 - 1.5 % Final   • Immature Grans % 10/17/2022 0.2  0.0 - 0.5 % Final   • Neutrophils, Absolute 10/17/2022 3.83  1.70 - 7.00 10*3/mm3 Final   • Lymphocytes, Absolute 10/17/2022 1.12  0.70 - 3.10 10*3/mm3 Final   • Monocytes, Absolute 10/17/2022 0.54  0.10 - 0.90 10*3/mm3 Final    • Eosinophils, Absolute 10/17/2022 0.15  0.00 - 0.40 10*3/mm3 Final   • Basophils, Absolute 10/17/2022 0.07  0.00 - 0.20 10*3/mm3 Final   • Immature Grans, Absolute 10/17/2022 0.01  0.00 - 0.05 10*3/mm3 Final   Lab on 07/28/2022   Component Date Value Ref Range Status   • Creatinine 07/28/2022 0.79  0.57 - 1.00 mg/dL Final   • eGFR 07/28/2022 82.1  >60.0 mL/min/1.73 Final    National Kidney Foundation and American Society of Nephrology (ASN) Task Force recommended calculation based on the Chronic Kidney Disease Epidemiology Collaboration (CKD-EPI) equation refit without adjustment for race.   • Sed Rate 07/28/2022 <1  0 - 30 mm/hr Final   • C-Reactive Protein 07/28/2022 <0.30  0.00 - 0.50 mg/dL Final   • Total Protein 07/28/2022 6.4  6.0 - 8.5 g/dL Final   • Albumin 07/28/2022 4.20  3.50 - 5.20 g/dL Final   • ALT (SGPT) 07/28/2022 18  1 - 33 U/L Final   • AST (SGOT) 07/28/2022 19  1 - 32 U/L Final   • Alkaline Phosphatase 07/28/2022 48  39 - 117 U/L Final   • Total Bilirubin 07/28/2022 0.3  0.0 - 1.2 mg/dL Final   • Bilirubin, Direct 07/28/2022 <0.2  0.0 - 0.3 mg/dL Final   • Bilirubin, Indirect 07/28/2022    Final    Unable to calculate   • WBC 07/28/2022 7.22  3.40 - 10.80 10*3/mm3 Final   • RBC 07/28/2022 4.16  3.77 - 5.28 10*6/mm3 Final   • Hemoglobin 07/28/2022 12.5  12.0 - 15.9 g/dL Final   • Hematocrit 07/28/2022 39.5  34.0 - 46.6 % Final   • MCV 07/28/2022 95.0  79.0 - 97.0 fL Final   • MCH 07/28/2022 30.0  26.6 - 33.0 pg Final   • MCHC 07/28/2022 31.6  31.5 - 35.7 g/dL Final   • RDW 07/28/2022 14.7  12.3 - 15.4 % Final   • RDW-SD 07/28/2022 51.3  37.0 - 54.0 fl Final   • MPV 07/28/2022 9.3  6.0 - 12.0 fL Final   • Platelets 07/28/2022 272  140 - 450 10*3/mm3 Final   • Neutrophil % 07/28/2022 65.2  42.7 - 76.0 % Final   • Lymphocyte % 07/28/2022 22.3  19.6 - 45.3 % Final   • Monocyte % 07/28/2022 9.0  5.0 - 12.0 % Final   • Eosinophil % 07/28/2022 1.8  0.3 - 6.2 % Final   • Basophil % 07/28/2022 1.4  0.0 -  1.5 % Final   • Immature Grans % 07/28/2022 0.3  0.0 - 0.5 % Final   • Neutrophils, Absolute 07/28/2022 4.71  1.70 - 7.00 10*3/mm3 Final   • Lymphocytes, Absolute 07/28/2022 1.61  0.70 - 3.10 10*3/mm3 Final   • Monocytes, Absolute 07/28/2022 0.65  0.10 - 0.90 10*3/mm3 Final   • Eosinophils, Absolute 07/28/2022 0.13  0.00 - 0.40 10*3/mm3 Final   • Basophils, Absolute 07/28/2022 0.10  0.00 - 0.20 10*3/mm3 Final   • Immature Grans, Absolute 07/28/2022 0.02  0.00 - 0.05 10*3/mm3 Final       EKG Results:  No orders to display       Imaging Results:  No Images in the past 120 days found..      Assessment & Plan   Diagnoses and all orders for this visit:    1. Mild episode of recurrent major depressive disorder (HCC) (Primary)    2. Generalized anxiety disorder        Visit Diagnoses:    ICD-10-CM ICD-9-CM   1. Mild episode of recurrent major depressive disorder (HCC)  F33.0 296.31   2. Generalized anxiety disorder  F41.1 300.02       PLAN:  1. Safety: No acute safety concerns  2. Therapy: Declines  3. Risk Assessment: Risk of self-harm acutely is low.  Risk factors include anxiety disorder, mood disorder, and recent psychosocial stressors (pandemic). Protective factors include no family history, denies access to guns/weapons, no present SI, no history of suicide attempts or self-harm in the past, minimal AODA, healthcare seeking, future orientation, willingness to engage in care.  Risk of self-harm chronically is also low, but could be further elevated in the event of treatment noncompliance and/or AODA.  4. Meds: Continue Cymbalta 60 mg by mouth twice daily to target depression and anxiety. Discussed all risks, benefits, alternatives, and side effects of Duloxetine including but not limited to GI upset, sexual dysfunction, bleeding risk, seizure risk, weight loss, insomnia, diaphoresis, drowsiness, headache, dizziness, fatigue, activation of chun or hypomania, increased fragility fracture risk, hyponatremia, increased  BP, hepatotoxicity, ocular effects, withdrawal syndrome following abrupt discontinuation, serotonin syndrome, and activation of suicidal ideation and behavior.  Discussed the need for patient to immediately call the office for any new or worsening symptoms, such as worsening depression; feeling nervous or restless; suicidal thoughts or actions; or other changes in mood or behavior, and all other concerns. Patient educated on medication compliance and the risks of suddenly stopping this medication or missing doses. Patient verbalized understanding and is agreeable to taking Duloxetine. Addressed all questions and concerns.   Continue Buspar 10 mg by mouth 3 times daily to target anxiety. Risks, benefits, alternatives discussed with patient including nausea, GI upset, dizziness, mild sedation, and falls risk.  After discussion of these risks and benefits, the patient voiced understanding and agreed to proceed. Instructed to take at 7am, between 12-3 pm, and at bedtime 8-9 pm, to not take 2nd dose later than 3 pm. Although patient has missed some midday doses, patient has been adherent to restricted time frame, and reported effectiveness and improved anxiety.   5. Labs: n/a    Depression and anxiety symptoms are under good control with Buspar and Cymbalta. Will continue current regimen as patient reported effectiveness.  GI issues have improved and suspects related to Methotrexate.  Discussed risks vs benefits with medications and side effects in general.  Patient to contact provider if symptoms worsen or fail to improve.       11/11/22:   Declines therapy   Continue Cymbalta 60 mg by mouth twice daily to target depression and anxiety. Discussed all risks, benefits, alternatives, and side effects of Duloxetine including but not limited to GI upset, sexual dysfunction, bleeding risk, seizure risk, weight loss, insomnia, diaphoresis, drowsiness, headache, dizziness, fatigue, activation of chun or hypomania, increased  fragility fracture risk, hyponatremia, increased BP, hepatotoxicity, ocular effects, withdrawal syndrome following abrupt discontinuation, serotonin syndrome, and activation of suicidal ideation and behavior.  Discussed the need for patient to immediately call the office for any new or worsening symptoms, such as worsening depression; feeling nervous or restless; suicidal thoughts or actions; or other changes in mood or behavior, and all other concerns. Patient educated on medication compliance and the risks of suddenly stopping this medication or missing doses. Patient verbalized understanding and is agreeable to taking Duloxetine. Addressed all questions and concerns.   Increase Buspar from 10 mg by mouth twice daily TO 3 times daily  to target anxiety. Risks, benefits, alternatives discussed with patient including nausea, GI upset, dizziness, mild sedation, and falls risk.  After discussion of these risks and benefits, the patient voiced understanding and agreed to proceed. Instructed to take at 7am, between 12-3 pm, and at bedtime 8-9 pm, to not take 2nd dose later than 3 pm.     Patient presentation seems most consistent with anxiety and depression.  Suspect unrelieved anxiety symptoms are GI related based on assessment today vs dosing of Buspar as patient reported only taking Buspar once daily in the morning until one month ago.  Patient would take both morning and evening dose all at one in the morning.  Patient tolerating medications well without reported side effects.  Symptoms are managed well with Cymbalta.   Patient to contact provider if symptoms worsen or fail to improve.        Patient screened positive for depression based on a PHQ-9 score of 3 on 11/11/2022. Follow-up recommendations include: Prescribed antidepressant medication treatment and Suicide Risk Assessment performed.       TREATMENT PLAN/GOALS: Continue supportive psychotherapy efforts and medications as indicated. Treatment and medication  options discussed during today's visit. Patient acknowledged and verbally consented to continue with current treatment plan and was educated on the importance of compliance with treatment and follow-up appointments.    MEDICATION ISSUES:  ANNAMARIE reviewed as expected.  Discussed medication options and treatment plan of prescribed medication as well as the risks, benefits, and side effects including potential falls, possible impaired driving and metabolic adversities among others. Patient is agreeable to call the office with any worsening of symptoms or onset of side effects. Patient is agreeable to call 911 or go to the nearest ER should he/she begin having SI/HI. No medication side effects or related complaints today.     MEDS ORDERED DURING VISIT:  No orders of the defined types were placed in this encounter.      Return in about 3 months (around 4/6/2023).         I spent 24 minutes caring for Jaqueline on this date of service. This time includes time spent by me in the following activities: preparing for the visit, reviewing tests, obtaining and/or reviewing a separately obtained history, performing a medically appropriate examination and/or evaluation, counseling and educating the patient/family/caregiver, referring and communicating with other health care professionals and documenting information in the medical record.      This document has been electronically signed by NATE Moya  January 6, 2023 12:02 EST      Part of this note may be an electronic transcription/translation of spoken language to printed text using the Dragon Dictation System.

## 2023-02-07 DIAGNOSIS — F41.1 GENERALIZED ANXIETY DISORDER: Chronic | ICD-10-CM

## 2023-02-07 RX ORDER — BUSPIRONE HYDROCHLORIDE 10 MG/1
10 TABLET ORAL 3 TIMES DAILY
Qty: 90 TABLET | Refills: 2 | Status: SHIPPED | OUTPATIENT
Start: 2023-02-07 | End: 2023-05-08

## 2023-02-09 DIAGNOSIS — I10 ESSENTIAL HYPERTENSION: ICD-10-CM

## 2023-02-09 RX ORDER — ATENOLOL 25 MG/1
50 TABLET ORAL DAILY
Qty: 180 TABLET | Refills: 1 | Status: SHIPPED | OUTPATIENT
Start: 2023-02-09

## 2023-02-10 ENCOUNTER — LAB (OUTPATIENT)
Dept: LAB | Facility: HOSPITAL | Age: 68
End: 2023-02-10
Payer: MEDICARE

## 2023-02-10 DIAGNOSIS — M06.9 RHEUMATOID ARTHRITIS, INVOLVING UNSPECIFIED SITE, UNSPECIFIED WHETHER RHEUMATOID FACTOR PRESENT: ICD-10-CM

## 2023-02-10 DIAGNOSIS — Z79.899 ENCOUNTER FOR LONG-TERM (CURRENT) USE OF OTHER MEDICATIONS: ICD-10-CM

## 2023-02-10 DIAGNOSIS — R53.83 OTHER FATIGUE: ICD-10-CM

## 2023-02-10 LAB
ALBUMIN SERPL-MCNC: 4.3 G/DL (ref 3.5–5.2)
ALP SERPL-CCNC: 45 U/L (ref 39–117)
ALT SERPL W P-5'-P-CCNC: 15 U/L (ref 1–33)
AST SERPL-CCNC: 21 U/L (ref 1–32)
BASOPHILS # BLD AUTO: 0.09 10*3/MM3 (ref 0–0.2)
BASOPHILS NFR BLD AUTO: 1.1 % (ref 0–1.5)
BILIRUB CONJ SERPL-MCNC: <0.2 MG/DL (ref 0–0.3)
BILIRUB INDIRECT SERPL-MCNC: NORMAL MG/DL
BILIRUB SERPL-MCNC: 0.3 MG/DL (ref 0–1.2)
CREAT SERPL-MCNC: 0.98 MG/DL (ref 0.57–1)
CRP SERPL-MCNC: <0.3 MG/DL (ref 0–0.5)
DEPRECATED RDW RBC AUTO: 48.8 FL (ref 37–54)
EGFRCR SERPLBLD CKD-EPI 2021: 63 ML/MIN/1.73
EOSINOPHIL # BLD AUTO: 0.08 10*3/MM3 (ref 0–0.4)
EOSINOPHIL NFR BLD AUTO: 1 % (ref 0.3–6.2)
ERYTHROCYTE [DISTWIDTH] IN BLOOD BY AUTOMATED COUNT: 14.4 % (ref 12.3–15.4)
ERYTHROCYTE [SEDIMENTATION RATE] IN BLOOD: 1 MM/HR (ref 0–30)
HCT VFR BLD AUTO: 38.7 % (ref 34–46.6)
HGB BLD-MCNC: 12.5 G/DL (ref 12–15.9)
IMM GRANULOCYTES # BLD AUTO: 0.02 10*3/MM3 (ref 0–0.05)
IMM GRANULOCYTES NFR BLD AUTO: 0.2 % (ref 0–0.5)
LYMPHOCYTES # BLD AUTO: 1.29 10*3/MM3 (ref 0.7–3.1)
LYMPHOCYTES NFR BLD AUTO: 15.4 % (ref 19.6–45.3)
MCH RBC QN AUTO: 29.9 PG (ref 26.6–33)
MCHC RBC AUTO-ENTMCNC: 32.3 G/DL (ref 31.5–35.7)
MCV RBC AUTO: 92.6 FL (ref 79–97)
MONOCYTES # BLD AUTO: 0.59 10*3/MM3 (ref 0.1–0.9)
MONOCYTES NFR BLD AUTO: 7 % (ref 5–12)
NEUTROPHILS NFR BLD AUTO: 6.31 10*3/MM3 (ref 1.7–7)
NEUTROPHILS NFR BLD AUTO: 75.3 % (ref 42.7–76)
PLATELET # BLD AUTO: 287 10*3/MM3 (ref 140–450)
PMV BLD AUTO: 9 FL (ref 6–12)
PROT SERPL-MCNC: 6.7 G/DL (ref 6–8.5)
RBC # BLD AUTO: 4.18 10*6/MM3 (ref 3.77–5.28)
WBC NRBC COR # BLD: 8.38 10*3/MM3 (ref 3.4–10.8)

## 2023-02-10 PROCEDURE — 36415 COLL VENOUS BLD VENIPUNCTURE: CPT

## 2023-02-10 PROCEDURE — 80076 HEPATIC FUNCTION PANEL: CPT

## 2023-02-10 PROCEDURE — 85652 RBC SED RATE AUTOMATED: CPT

## 2023-02-10 PROCEDURE — 85025 COMPLETE CBC W/AUTO DIFF WBC: CPT

## 2023-02-10 PROCEDURE — 82565 ASSAY OF CREATININE: CPT

## 2023-02-10 PROCEDURE — 86140 C-REACTIVE PROTEIN: CPT

## 2023-03-24 ENCOUNTER — TELEPHONE (OUTPATIENT)
Dept: FAMILY MEDICINE CLINIC | Age: 68
End: 2023-03-24
Payer: MEDICARE

## 2023-03-24 DIAGNOSIS — Z87.891 PERSONAL HISTORY OF TOBACCO USE, PRESENTING HAZARDS TO HEALTH: Primary | ICD-10-CM

## 2023-03-24 NOTE — TELEPHONE ENCOUNTER
----- Message from Rosalinda Irizarry MD sent at 3/24/2022  5:27 PM EDT -----  Please notify pt that it is time for her 6 month low dose CT chest to screen for lung cancer.  Please pend order to me.  Thanks, BZLYNNETTE

## 2023-04-03 ENCOUNTER — LAB (OUTPATIENT)
Dept: LAB | Facility: HOSPITAL | Age: 68
End: 2023-04-03
Payer: MEDICARE

## 2023-04-03 ENCOUNTER — HOSPITAL ENCOUNTER (OUTPATIENT)
Dept: CT IMAGING | Facility: HOSPITAL | Age: 68
Discharge: HOME | End: 2023-04-03
Payer: MEDICARE

## 2023-04-03 DIAGNOSIS — Z79.899 ENCOUNTER FOR LONG-TERM (CURRENT) USE OF OTHER MEDICATIONS: ICD-10-CM

## 2023-04-03 DIAGNOSIS — M06.9 RHEUMATOID ARTHRITIS, INVOLVING UNSPECIFIED SITE, UNSPECIFIED WHETHER RHEUMATOID FACTOR PRESENT: ICD-10-CM

## 2023-04-03 DIAGNOSIS — Z87.891 PERSONAL HISTORY OF TOBACCO USE, PRESENTING HAZARDS TO HEALTH: ICD-10-CM

## 2023-04-03 DIAGNOSIS — R53.83 OTHER FATIGUE: ICD-10-CM

## 2023-04-03 LAB
ALBUMIN SERPL-MCNC: 4.2 G/DL (ref 3.5–5.2)
ALP SERPL-CCNC: 47 U/L (ref 39–117)
ALT SERPL W P-5'-P-CCNC: 16 U/L (ref 1–33)
AST SERPL-CCNC: 19 U/L (ref 1–32)
BASOPHILS # BLD AUTO: 0.07 10*3/MM3 (ref 0–0.2)
BASOPHILS NFR BLD AUTO: 0.9 % (ref 0–1.5)
BILIRUB CONJ SERPL-MCNC: <0.2 MG/DL (ref 0–0.3)
BILIRUB INDIRECT SERPL-MCNC: NORMAL MG/DL
BILIRUB SERPL-MCNC: <0.2 MG/DL (ref 0–1.2)
CREAT SERPL-MCNC: 0.9 MG/DL (ref 0.57–1)
CRP SERPL-MCNC: 0.4 MG/DL (ref 0–0.5)
DEPRECATED RDW RBC AUTO: 44.7 FL (ref 37–54)
EGFRCR SERPLBLD CKD-EPI 2021: 69.8 ML/MIN/1.73
EOSINOPHIL # BLD AUTO: 0.08 10*3/MM3 (ref 0–0.4)
EOSINOPHIL NFR BLD AUTO: 1.1 % (ref 0.3–6.2)
ERYTHROCYTE [DISTWIDTH] IN BLOOD BY AUTOMATED COUNT: 13.9 % (ref 12.3–15.4)
ERYTHROCYTE [SEDIMENTATION RATE] IN BLOOD: 6 MM/HR (ref 0–30)
HCT VFR BLD AUTO: 35.6 % (ref 34–46.6)
HGB BLD-MCNC: 12.1 G/DL (ref 12–15.9)
IMM GRANULOCYTES # BLD AUTO: 0.03 10*3/MM3 (ref 0–0.05)
IMM GRANULOCYTES NFR BLD AUTO: 0.4 % (ref 0–0.5)
LYMPHOCYTES # BLD AUTO: 1.77 10*3/MM3 (ref 0.7–3.1)
LYMPHOCYTES NFR BLD AUTO: 23.8 % (ref 19.6–45.3)
MCH RBC QN AUTO: 30.2 PG (ref 26.6–33)
MCHC RBC AUTO-ENTMCNC: 34 G/DL (ref 31.5–35.7)
MCV RBC AUTO: 88.8 FL (ref 79–97)
MONOCYTES # BLD AUTO: 0.63 10*3/MM3 (ref 0.1–0.9)
MONOCYTES NFR BLD AUTO: 8.5 % (ref 5–12)
NEUTROPHILS NFR BLD AUTO: 4.86 10*3/MM3 (ref 1.7–7)
NEUTROPHILS NFR BLD AUTO: 65.3 % (ref 42.7–76)
NRBC BLD AUTO-RTO: 0.1 /100 WBC (ref 0–0.2)
PLATELET # BLD AUTO: 290 10*3/MM3 (ref 140–450)
PMV BLD AUTO: 9.9 FL (ref 6–12)
PROT SERPL-MCNC: 6.7 G/DL (ref 6–8.5)
RBC # BLD AUTO: 4.01 10*6/MM3 (ref 3.77–5.28)
WBC NRBC COR # BLD: 7.44 10*3/MM3 (ref 3.4–10.8)

## 2023-04-03 PROCEDURE — 86140 C-REACTIVE PROTEIN: CPT

## 2023-04-03 PROCEDURE — 85652 RBC SED RATE AUTOMATED: CPT

## 2023-04-03 PROCEDURE — 82565 ASSAY OF CREATININE: CPT

## 2023-04-03 PROCEDURE — 71271 CT THORAX LUNG CANCER SCR C-: CPT

## 2023-04-03 PROCEDURE — 36415 COLL VENOUS BLD VENIPUNCTURE: CPT

## 2023-04-03 PROCEDURE — 80076 HEPATIC FUNCTION PANEL: CPT

## 2023-04-03 PROCEDURE — 85025 COMPLETE CBC W/AUTO DIFF WBC: CPT

## 2023-04-07 ENCOUNTER — TELEPHONE (OUTPATIENT)
Dept: FAMILY MEDICINE CLINIC | Age: 68
End: 2023-04-07
Payer: MEDICARE

## 2023-04-12 DIAGNOSIS — K21.9 GASTROESOPHAGEAL REFLUX DISEASE WITHOUT ESOPHAGITIS: ICD-10-CM

## 2023-04-12 RX ORDER — ESOMEPRAZOLE MAGNESIUM 40 MG/1
CAPSULE, DELAYED RELEASE ORAL
Qty: 90 CAPSULE | Refills: 1 | Status: SHIPPED | OUTPATIENT
Start: 2023-04-12

## 2023-04-25 ENCOUNTER — HOSPITAL ENCOUNTER (OUTPATIENT)
Dept: GENERAL RADIOLOGY | Facility: HOSPITAL | Age: 68
Discharge: HOME | End: 2023-04-25
Admitting: FAMILY MEDICINE
Payer: MEDICARE

## 2023-04-25 ENCOUNTER — OFFICE VISIT (OUTPATIENT)
Dept: FAMILY MEDICINE CLINIC | Age: 68
End: 2023-04-25
Payer: MEDICARE

## 2023-04-25 VITALS
HEIGHT: 59 IN | WEIGHT: 113 LBS | HEART RATE: 79 BPM | DIASTOLIC BLOOD PRESSURE: 57 MMHG | BODY MASS INDEX: 22.78 KG/M2 | SYSTOLIC BLOOD PRESSURE: 119 MMHG | OXYGEN SATURATION: 99 %

## 2023-04-25 DIAGNOSIS — M25.551 RIGHT HIP PAIN: Primary | ICD-10-CM

## 2023-04-25 DIAGNOSIS — K21.9 GASTROESOPHAGEAL REFLUX DISEASE WITHOUT ESOPHAGITIS: ICD-10-CM

## 2023-04-25 DIAGNOSIS — M05.742 RHEUMATOID ARTHRITIS INVOLVING BOTH HANDS WITH POSITIVE RHEUMATOID FACTOR: ICD-10-CM

## 2023-04-25 DIAGNOSIS — I10 ESSENTIAL HYPERTENSION: ICD-10-CM

## 2023-04-25 DIAGNOSIS — F33.1 MODERATE EPISODE OF RECURRENT MAJOR DEPRESSIVE DISORDER: ICD-10-CM

## 2023-04-25 DIAGNOSIS — M05.741 RHEUMATOID ARTHRITIS INVOLVING BOTH HANDS WITH POSITIVE RHEUMATOID FACTOR: ICD-10-CM

## 2023-04-25 DIAGNOSIS — F41.1 GENERALIZED ANXIETY DISORDER: ICD-10-CM

## 2023-04-25 DIAGNOSIS — M81.0 AGE-RELATED OSTEOPOROSIS WITHOUT CURRENT PATHOLOGICAL FRACTURE: ICD-10-CM

## 2023-04-25 DIAGNOSIS — M25.551 RIGHT HIP PAIN: ICD-10-CM

## 2023-04-25 PROBLEM — Z00.00 ANNUAL PHYSICAL EXAM: Status: RESOLVED | Noted: 2021-10-21 | Resolved: 2023-04-25

## 2023-04-25 PROCEDURE — 99214 OFFICE O/P EST MOD 30 MIN: CPT | Performed by: FAMILY MEDICINE

## 2023-04-25 PROCEDURE — 73502 X-RAY EXAM HIP UNI 2-3 VIEWS: CPT

## 2023-04-25 PROCEDURE — 3078F DIAST BP <80 MM HG: CPT | Performed by: FAMILY MEDICINE

## 2023-04-25 PROCEDURE — 1160F RVW MEDS BY RX/DR IN RCRD: CPT | Performed by: FAMILY MEDICINE

## 2023-04-25 PROCEDURE — 3074F SYST BP LT 130 MM HG: CPT | Performed by: FAMILY MEDICINE

## 2023-04-25 PROCEDURE — 1159F MED LIST DOCD IN RCRD: CPT | Performed by: FAMILY MEDICINE

## 2023-04-25 RX ORDER — AMLODIPINE BESYLATE 2.5 MG/1
2.5 TABLET ORAL DAILY
Qty: 90 TABLET | Refills: 1 | Status: SHIPPED | OUTPATIENT
Start: 2023-04-25

## 2023-04-25 NOTE — ASSESSMENT & PLAN NOTE
Recent EGD by Dr. Allred showed only gastritis.  She continues on Nexium 40 mg daily for now.  She does have an appointment to get in with GI in [place] and may as the reflux has been giving her some problems.

## 2023-04-25 NOTE — ASSESSMENT & PLAN NOTE
Remittent right hip pain for the past few years, with more consistent pain for the past 3 months since doing a lot of walking down in [place].  Checking x-ray today.  She has been following with chiropractor which provides temporary relief but nothing long-lasting.  Consider switching to Physical Therapy versus referral to Orthopedics going forward.

## 2023-04-25 NOTE — Clinical Note
Destiny thinks someone called her to get set up for her Prolia injection, but she wasn't sure.  Thanks, BZLYNNETTE

## 2023-04-25 NOTE — PROGRESS NOTES
Chief Complaint  Hypertension (6 month f/u)    Subjective          Jaqueline Katz presents to [facility] today for f/u of routine problems.    She has been struggling with R hip pain intermittently for years, but it has been more consistent for the past 3 months (when she was doing a lot of walking down in [place]).  She has been seeing the chiropractor and it helps temporarily, but the pain always comes back.  She is on methotrexate for rheumatoid arthritis, and she follows with Dr. [facility] in [place].    She is on losartan and atenolol for hypertension.  Her blood pressure has been well controlled.  She denies chest pain, palpitations or shortness of breath.    She is on duloxetine and buspirone for anxiety and depression.  She was referred to [facility] a few months ago and was last seen there on 1/6/2023.  Destiny did cancel her appointment with [facility] earlier this month as she felt that she was doing quite well.  Her buspirone is now up to 10 mg 3 times daily, although frequently she only takes it BID.     She is on esomeprazole for GERD.  She has had more indigestion and reflux than normal.  She is getting set up with GI and goes to see them in May.  She had an EGD with Dr. Allred [facility] at [place] that reportedly just showed gastritis.      She is on Prolia for osteoporosis.  She is up-to-date on DEXA, last done 5/2022 and this showed osteoporosis.      Current Outpatient Medications:   •  amLODIPine (NORVASC) 2.5 MG tablet, Take 1 tablet by mouth Daily. for blood pressure, Disp: 90 tablet, Rfl: 1  •  atenolol (TENORMIN) 25 MG tablet, Take 2 tablets by mouth Daily., Disp: 180 tablet, Rfl: 1  •  busPIRone (BUSPAR) 10 MG tablet, Take 1 tablet by mouth 3 (Three) Times a Day for 90 days. Indications: Anxiety Disorder, Disp: 90 tablet, Rfl: 2  •  denosumab (Prolia) 60 MG/ML solution prefilled syringe syringe, 1 mL Every 6 (Six) Months., Disp: , Rfl:   •  DULoxetine (CYMBALTA)  "60 MG capsule, TAKE ONE CAPSULE BY MOUTH TWICE DAILY, Disp: 180 capsule, Rfl: 1  •  esomeprazole (nexIUM) 40 MG capsule, TAKE ONE CAPSULE BY MOUTH EVERY DAY, Disp: 90 capsule, Rfl: 1  •  folic acid (FOLVITE) 1 MG tablet, Take 1 tablet by mouth Daily., Disp: , Rfl:   •  losartan (COZAAR) 100 MG tablet, TAKE ONE TABLET BY MOUTH EVERY DAY, Disp: 90 tablet, Rfl: 1  •  methotrexate 2.5 MG tablet, Take 8 tablets by mouth 1 (One) Time Per Week., Disp: , Rfl:   •  montelukast (SINGULAIR) 10 MG tablet, TAKE ONE TABLET BY MOUTH EVERY EVENING, Disp: 90 tablet, Rfl: 3  •  olopatadine (PATANOL) 0.1 % ophthalmic solution, Patanol 0.1 % eye drops  INSTILL 1 DROP INTO AFFECTED EYE(S) BY OPHTHALMIC ROUTE 2 TIMES PER DAY AT AN INTERVAL OF 6 TO 8 HOURS, Disp: , Rfl:     Current Facility-Administered Medications:   •  denosumab (PROLIA) syringe 60 mg, 60 mg, Subcutaneous, Q6 Months, Rosalinda Irizarry MD, 60 mg at 11/11/22 1018    Allergies:  Penicillins and Nsaids      Objective   Vital Signs:   Vitals:    04/25/23 1010   BP: 119/57   BP Location: Left arm   Patient Position: Sitting   Cuff Size: Adult   Pulse: 79   SpO2: 99%   Weight: 51.3 kg (113 lb)   Height: 149.9 cm (59.02\")       Physical Exam  Vitals reviewed.   Constitutional:       General: She is not in acute distress.     Appearance: Normal appearance. She is well-developed.   HENT:      Head: Normocephalic and atraumatic.      Right Ear: External ear normal.      Left Ear: External ear normal.   Eyes:      Extraocular Movements: Extraocular movements intact.      Conjunctiva/sclera: Conjunctivae normal.      Pupils: Pupils are equal, round, and reactive to light.   Cardiovascular:      Rate and Rhythm: Normal rate and regular rhythm.      Heart sounds: No murmur heard.  Pulmonary:      Effort: Pulmonary effort is normal.      Breath sounds: Normal breath sounds. No wheezing, rhonchi or rales.   Abdominal:      General: Bowel sounds are normal. There is no distension. "      Palpations: Abdomen is soft.      Tenderness: There is abdominal tenderness (mild, epigastric).   Musculoskeletal:         General: Normal range of motion.   Neurological:      Mental Status: She is alert.   Psychiatric:         Mood and Affect: Affect normal.           Lab Results   Component Value Date    GLUCOSE 96 11/11/2022    BUN 18 11/11/2022    CREATININE 0.90 04/03/2023    EGFRIFNONA 59 (L) 02/07/2022    BCR 19.4 11/11/2022    K 3.9 11/11/2022    CO2 25.9 11/11/2022    CALCIUM 9.3 11/11/2022    ALBUMIN 4.2 04/03/2023    LABIL2 1.5 03/24/2021    AST 19 04/03/2023    ALT 16 04/03/2023       Lab Results   Component Value Date    CHOL 189 11/11/2022    CHLPL 215 (H) 03/24/2021    TRIG 98 11/11/2022    HDL 49 11/11/2022     (H) 11/11/2022            Assessment and Plan    Diagnoses and all orders for this visit:    1. Right hip pain (Primary)  Assessment & Plan:  Remittent right hip pain for the past few years, with more consistent pain for the past 3 months since doing a lot of walking down in [place].  Checking x-ray today.  She has been following with chiropractor which provides temporary relief but nothing long-lasting.  Consider switching to Physical Therapy versus referral to Orthopedics going forward.    Orders:  -     XR Hip With or Without Pelvis 2 - 3 View Right; Future    2. Essential hypertension  Assessment & Plan:  Blood pressure running at goal.  Continue amlodipine 2.5 mg daily and atenolol 50 mg daily.  Refills sent as below.  Labs are reviewed and up-to-date.    Orders:  -     amLODIPine (NORVASC) 2.5 MG tablet; Take 1 tablet by mouth Daily. for blood pressure  Dispense: 90 tablet; Refill: 1    3. Generalized anxiety disorder  Assessment & Plan:  Buspirone 10 mg 3 times daily and duloxetine 60 mg twice daily has been controlling her symptoms well.  She does not feel that she needs to go back to see [facility] anymore at this time.  I will take back over on her prescriptions for now.  No  refills needed today.      4. Moderate episode of recurrent major depressive disorder  Assessment & Plan:  As per anxiety plan.      5. Rheumatoid arthritis involving both hands with positive rheumatoid factor (HCC)  Overview:  Following with Dr. [facility] in [place].  On methotrexate.      6. Gastroesophageal reflux disease without esophagitis  Assessment & Plan:  Recent EGD by Dr. Allred showed only gastritis.  She continues on Nexium 40 mg daily for now.  She does have an appointment to get in with GI in [place] and may as the reflux has been giving her some problems.      7. Age-related osteoporosis without current pathological fracture  Overview:  On Prolia.  DEXA is up-to-date, last done 5/2022 showing osteoporosis.    Assessment & Plan:  Checking on when her next shot is due.        Follow Up   Return in about 6 months (around 10/25/2023) for Medicare Wellness.  Patient was given instructions and counseling regarding her condition or for health maintenance advice. Please see specific information pulled into the AVS if appropriate.           04/25/2023

## 2023-04-25 NOTE — ASSESSMENT & PLAN NOTE
Blood pressure running at goal.  Continue amlodipine 2.5 mg daily and atenolol 50 mg daily.  Refills sent as below.  Labs are reviewed and up-to-date.

## 2023-04-25 NOTE — ASSESSMENT & PLAN NOTE
Buspirone 10 mg 3 times daily and duloxetine 60 mg twice daily has been controlling her symptoms well.  She does not feel that she needs to go back to see [facility] anymore at this time.  I will take back over on her prescriptions for now.  No refills needed today.

## 2023-05-12 DIAGNOSIS — F41.1 GENERALIZED ANXIETY DISORDER: Chronic | ICD-10-CM

## 2023-05-12 DIAGNOSIS — I10 ESSENTIAL HYPERTENSION: ICD-10-CM

## 2023-05-12 RX ORDER — BUSPIRONE HYDROCHLORIDE 10 MG/1
TABLET ORAL
Qty: 90 TABLET | Refills: 2 | OUTPATIENT
Start: 2023-05-12

## 2023-05-12 RX ORDER — AMLODIPINE BESYLATE 2.5 MG/1
2.5 TABLET ORAL DAILY
Qty: 90 TABLET | Refills: 1 | Status: CANCELLED | OUTPATIENT
Start: 2023-05-12

## 2023-05-12 RX ORDER — BUSPIRONE HYDROCHLORIDE 10 MG/1
10 TABLET ORAL 3 TIMES DAILY
Qty: 90 TABLET | Refills: 2 | Status: CANCELLED | OUTPATIENT
Start: 2023-05-12 | End: 2023-08-10

## 2023-05-12 RX ORDER — ATENOLOL 25 MG/1
50 TABLET ORAL DAILY
Qty: 180 TABLET | Refills: 1 | Status: CANCELLED | OUTPATIENT
Start: 2023-05-12

## 2023-05-12 NOTE — TELEPHONE ENCOUNTER
Medication refill request for Buspirone 10mg.     busPIRone (BUSPAR) 10 MG tablet (02/07/2023)    Called pt to schedule f/u appt as she canceled last one.     Pt reported she doesn't wish to return to see provider from our practice and will be reaching out to her PCP for future refills. Order refused and pended.

## 2023-05-12 NOTE — TELEPHONE ENCOUNTER
"  Caller: Jaqueline Katz \"Destiny\"    Relationship: Self    Best call back number: [phone]    Requested Prescriptions:   Requested Prescriptions     Pending Prescriptions Disp Refills   • busPIRone (BUSPAR) 10 MG tablet 90 tablet 2     Sig: Take 1 tablet by mouth 3 (Three) Times a Day for 90 days. Indications: Anxiety Disorder   • amLODIPine (NORVASC) 2.5 MG tablet 90 tablet 1     Sig: Take 1 tablet by mouth Daily. for blood pressure   • atenolol (TENORMIN) 25 MG tablet 180 tablet 1     Sig: Take 2 tablets by mouth Daily.        Pharmacy where request should be sent: [place], [place] - [address] - [phone] [facility] [phone] FX     Last office visit with prescribing clinician: 4/25/2023   Last telemedicine visit with prescribing clinician: 4/25/2023   Next office visit with prescribing clinician: 10/27/2023       Does the patient have less than a 3 day supply:  [x] Yes  [] No    Would you like a call back once the refill request has been completed: [x] Yes [] No    If the office needs to give you a call back, can they leave a voicemail: [x] Yes [] No    Nick Vilchis Rep   05/12/23 13:46 EDT             "

## 2023-05-15 ENCOUNTER — CLINICAL SUPPORT (OUTPATIENT)
Dept: FAMILY MEDICINE CLINIC | Age: 68
End: 2023-05-15
Payer: MEDICARE

## 2023-05-15 ENCOUNTER — TRANSCRIBE ORDERS (OUTPATIENT)
Dept: LAB | Facility: HOSPITAL | Age: 68
End: 2023-05-15
Payer: MEDICARE

## 2023-05-15 ENCOUNTER — LAB (OUTPATIENT)
Dept: LAB | Facility: HOSPITAL | Age: 68
End: 2023-05-15
Payer: MEDICARE

## 2023-05-15 DIAGNOSIS — M19.041 ARTHRITIS OF RIGHT HAND: ICD-10-CM

## 2023-05-15 DIAGNOSIS — M25.569 KNEE PAIN, UNSPECIFIED CHRONICITY, UNSPECIFIED LATERALITY: ICD-10-CM

## 2023-05-15 DIAGNOSIS — M06.9 RHEUMATOID ARTHRITIS, INVOLVING UNSPECIFIED SITE, UNSPECIFIED WHETHER RHEUMATOID FACTOR PRESENT: ICD-10-CM

## 2023-05-15 DIAGNOSIS — M81.0 SENILE OSTEOPOROSIS: ICD-10-CM

## 2023-05-15 DIAGNOSIS — K21.9 CHALASIA OF LOWER ESOPHAGEAL SPHINCTER: ICD-10-CM

## 2023-05-15 DIAGNOSIS — K21.9 CHALASIA OF LOWER ESOPHAGEAL SPHINCTER: Primary | ICD-10-CM

## 2023-05-15 DIAGNOSIS — M81.0 AGE-RELATED OSTEOPOROSIS WITHOUT CURRENT PATHOLOGICAL FRACTURE: ICD-10-CM

## 2023-05-15 LAB
ALBUMIN SERPL-MCNC: 4.4 G/DL (ref 3.5–5.2)
ALBUMIN/GLOB SERPL: 1.8 G/DL
ALP SERPL-CCNC: 51 U/L (ref 39–117)
ALT SERPL W P-5'-P-CCNC: 17 U/L (ref 1–33)
ANION GAP SERPL CALCULATED.3IONS-SCNC: 9 MMOL/L (ref 5–15)
AST SERPL-CCNC: 16 U/L (ref 1–32)
BASOPHILS # BLD AUTO: 0.02 10*3/MM3 (ref 0–0.2)
BASOPHILS NFR BLD AUTO: 0.2 % (ref 0–1.5)
BILIRUB SERPL-MCNC: 0.2 MG/DL (ref 0–1.2)
BUN SERPL-MCNC: 25 MG/DL (ref 8–23)
BUN/CREAT SERPL: 28.7 (ref 7–25)
CALCIUM SPEC-SCNC: 9 MG/DL (ref 8.6–10.5)
CHLORIDE SERPL-SCNC: 101 MMOL/L (ref 98–107)
CO2 SERPL-SCNC: 28 MMOL/L (ref 22–29)
CREAT SERPL-MCNC: 0.87 MG/DL (ref 0.57–1)
CREAT UR-MCNC: 74.6 MG/DL
DEPRECATED RDW RBC AUTO: 48.6 FL (ref 37–54)
EGFRCR SERPLBLD CKD-EPI 2021: 72.7 ML/MIN/1.73
EOSINOPHIL # BLD AUTO: 0 10*3/MM3 (ref 0–0.4)
EOSINOPHIL NFR BLD AUTO: 0 % (ref 0.3–6.2)
ERYTHROCYTE [DISTWIDTH] IN BLOOD BY AUTOMATED COUNT: 14.4 % (ref 12.3–15.4)
ERYTHROCYTE [SEDIMENTATION RATE] IN BLOOD: <1 MM/HR (ref 0–30)
GLOBULIN UR ELPH-MCNC: 2.4 GM/DL
GLUCOSE SERPL-MCNC: 93 MG/DL (ref 65–99)
HCT VFR BLD AUTO: 39.6 % (ref 34–46.6)
HGB BLD-MCNC: 13 G/DL (ref 12–15.9)
IMM GRANULOCYTES # BLD AUTO: 0.11 10*3/MM3 (ref 0–0.05)
IMM GRANULOCYTES NFR BLD AUTO: 1 % (ref 0–0.5)
LYMPHOCYTES # BLD AUTO: 1.29 10*3/MM3 (ref 0.7–3.1)
LYMPHOCYTES NFR BLD AUTO: 11.4 % (ref 19.6–45.3)
MCH RBC QN AUTO: 30.9 PG (ref 26.6–33)
MCHC RBC AUTO-ENTMCNC: 32.8 G/DL (ref 31.5–35.7)
MCV RBC AUTO: 94.1 FL (ref 79–97)
MONOCYTES # BLD AUTO: 0.89 10*3/MM3 (ref 0.1–0.9)
MONOCYTES NFR BLD AUTO: 7.8 % (ref 5–12)
NEUTROPHILS NFR BLD AUTO: 79.6 % (ref 42.7–76)
NEUTROPHILS NFR BLD AUTO: 9.04 10*3/MM3 (ref 1.7–7)
PLATELET # BLD AUTO: 340 10*3/MM3 (ref 140–450)
PMV BLD AUTO: 9 FL (ref 6–12)
POTASSIUM SERPL-SCNC: 4.2 MMOL/L (ref 3.5–5.2)
PROT ?TM UR-MCNC: 6.5 MG/DL
PROT SERPL-MCNC: 6.8 G/DL (ref 6–8.5)
PROT/CREAT UR: 0.09 MG/G{CREAT}
RBC # BLD AUTO: 4.21 10*6/MM3 (ref 3.77–5.28)
SODIUM SERPL-SCNC: 138 MMOL/L (ref 136–145)
WBC NRBC COR # BLD: 11.35 10*3/MM3 (ref 3.4–10.8)

## 2023-05-15 PROCEDURE — 82570 ASSAY OF URINE CREATININE: CPT

## 2023-05-15 PROCEDURE — 85652 RBC SED RATE AUTOMATED: CPT

## 2023-05-15 PROCEDURE — 36415 COLL VENOUS BLD VENIPUNCTURE: CPT

## 2023-05-15 PROCEDURE — 85025 COMPLETE CBC W/AUTO DIFF WBC: CPT

## 2023-05-15 PROCEDURE — 80053 COMPREHEN METABOLIC PANEL: CPT

## 2023-05-15 PROCEDURE — 84156 ASSAY OF PROTEIN URINE: CPT

## 2023-05-27 DIAGNOSIS — I10 ESSENTIAL HYPERTENSION: ICD-10-CM

## 2023-05-30 RX ORDER — LOSARTAN POTASSIUM 100 MG/1
TABLET ORAL
Qty: 90 TABLET | Refills: 1 | Status: SHIPPED | OUTPATIENT
Start: 2023-05-30

## 2023-06-14 DIAGNOSIS — F41.1 GENERALIZED ANXIETY DISORDER: ICD-10-CM

## 2023-06-14 DIAGNOSIS — F33.1 MODERATE EPISODE OF RECURRENT MAJOR DEPRESSIVE DISORDER: ICD-10-CM

## 2023-06-14 RX ORDER — DULOXETIN HYDROCHLORIDE 60 MG/1
CAPSULE, DELAYED RELEASE ORAL
Qty: 180 CAPSULE | Refills: 1 | Status: SHIPPED | OUTPATIENT
Start: 2023-06-14

## 2023-07-27 DIAGNOSIS — I10 ESSENTIAL HYPERTENSION: ICD-10-CM

## 2023-07-27 RX ORDER — ATENOLOL 25 MG/1
TABLET ORAL
Qty: 180 TABLET | Refills: 1 | Status: SHIPPED | OUTPATIENT
Start: 2023-07-27

## 2023-08-17 DIAGNOSIS — I10 ESSENTIAL HYPERTENSION: ICD-10-CM

## 2023-08-17 RX ORDER — AMLODIPINE BESYLATE 2.5 MG/1
TABLET ORAL
Qty: 90 TABLET | Refills: 1 | Status: SHIPPED | OUTPATIENT
Start: 2023-08-17

## 2023-08-29 ENCOUNTER — LAB (OUTPATIENT)
Dept: LAB | Facility: HOSPITAL | Age: 68
End: 2023-08-29
Payer: MEDICARE

## 2023-08-29 ENCOUNTER — OFFICE VISIT (OUTPATIENT)
Dept: FAMILY MEDICINE CLINIC | Age: 68
End: 2023-08-29
Payer: MEDICARE

## 2023-08-29 VITALS
HEIGHT: 59 IN | BODY MASS INDEX: 23.1 KG/M2 | SYSTOLIC BLOOD PRESSURE: 131 MMHG | WEIGHT: 114.6 LBS | DIASTOLIC BLOOD PRESSURE: 87 MMHG | HEART RATE: 64 BPM | OXYGEN SATURATION: 97 %

## 2023-08-29 DIAGNOSIS — N93.9 VAGINAL SPOTTING: Primary | ICD-10-CM

## 2023-08-29 DIAGNOSIS — Z79.899 ENCOUNTER FOR LONG-TERM (CURRENT) USE OF OTHER MEDICATIONS: ICD-10-CM

## 2023-08-29 DIAGNOSIS — R32 URINARY INCONTINENCE, UNSPECIFIED TYPE: ICD-10-CM

## 2023-08-29 DIAGNOSIS — M06.9 RHEUMATOID ARTHRITIS, INVOLVING UNSPECIFIED SITE, UNSPECIFIED WHETHER RHEUMATOID FACTOR PRESENT: ICD-10-CM

## 2023-08-29 PROBLEM — Z86.16 HISTORY OF COVID-19: Status: RESOLVED | Noted: 2022-07-28 | Resolved: 2023-08-29

## 2023-08-29 LAB
ALBUMIN SERPL-MCNC: 4.1 G/DL (ref 3.5–5.2)
ALBUMIN/GLOB SERPL: 1.6 G/DL
ALP SERPL-CCNC: 41 U/L (ref 39–117)
ALT SERPL W P-5'-P-CCNC: 14 U/L (ref 1–33)
ANION GAP SERPL CALCULATED.3IONS-SCNC: 9.9 MMOL/L (ref 5–15)
AST SERPL-CCNC: 18 U/L (ref 1–32)
BACTERIA UR QL AUTO: ABNORMAL /HPF
BASOPHILS # BLD AUTO: 0.06 10*3/MM3 (ref 0–0.2)
BASOPHILS NFR BLD AUTO: 1 % (ref 0–1.5)
BILIRUB SERPL-MCNC: 0.3 MG/DL (ref 0–1.2)
BILIRUB UR QL STRIP: NEGATIVE
BUN SERPL-MCNC: 16 MG/DL (ref 8–23)
BUN/CREAT SERPL: 18 (ref 7–25)
CALCIUM SPEC-SCNC: 9.3 MG/DL (ref 8.6–10.5)
CHLORIDE SERPL-SCNC: 102 MMOL/L (ref 98–107)
CLARITY UR: ABNORMAL
CO2 SERPL-SCNC: 24.1 MMOL/L (ref 22–29)
COLOR UR: YELLOW
CREAT SERPL-MCNC: 0.89 MG/DL (ref 0.57–1)
CRP SERPL-MCNC: <0.3 MG/DL (ref 0–0.5)
DEPRECATED RDW RBC AUTO: 48.1 FL (ref 37–54)
EGFRCR SERPLBLD CKD-EPI 2021: 70.7 ML/MIN/1.73
EOSINOPHIL # BLD AUTO: 0.08 10*3/MM3 (ref 0–0.4)
EOSINOPHIL NFR BLD AUTO: 1.3 % (ref 0.3–6.2)
ERYTHROCYTE [DISTWIDTH] IN BLOOD BY AUTOMATED COUNT: 14.3 % (ref 12.3–15.4)
ERYTHROCYTE [SEDIMENTATION RATE] IN BLOOD: <1 MM/HR (ref 0–30)
GLOBULIN UR ELPH-MCNC: 2.6 GM/DL
GLUCOSE SERPL-MCNC: 63 MG/DL (ref 65–99)
GLUCOSE UR STRIP-MCNC: NEGATIVE MG/DL
HCT VFR BLD AUTO: 39.3 % (ref 34–46.6)
HGB BLD-MCNC: 12.8 G/DL (ref 12–15.9)
HGB UR QL STRIP.AUTO: ABNORMAL
IMM GRANULOCYTES # BLD AUTO: 0.02 10*3/MM3 (ref 0–0.05)
IMM GRANULOCYTES NFR BLD AUTO: 0.3 % (ref 0–0.5)
KETONES UR QL STRIP: NEGATIVE
LEUKOCYTE ESTERASE UR QL STRIP.AUTO: NEGATIVE
LYMPHOCYTES # BLD AUTO: 1.4 10*3/MM3 (ref 0.7–3.1)
LYMPHOCYTES NFR BLD AUTO: 22.8 % (ref 19.6–45.3)
MCH RBC QN AUTO: 30 PG (ref 26.6–33)
MCHC RBC AUTO-ENTMCNC: 32.6 G/DL (ref 31.5–35.7)
MCV RBC AUTO: 92.3 FL (ref 79–97)
MONOCYTES # BLD AUTO: 0.56 10*3/MM3 (ref 0.1–0.9)
MONOCYTES NFR BLD AUTO: 9.1 % (ref 5–12)
NEUTROPHILS NFR BLD AUTO: 4.02 10*3/MM3 (ref 1.7–7)
NEUTROPHILS NFR BLD AUTO: 65.5 % (ref 42.7–76)
NITRITE UR QL STRIP: NEGATIVE
PH UR STRIP.AUTO: 6 [PH] (ref 5–8)
PLATELET # BLD AUTO: 287 10*3/MM3 (ref 140–450)
PMV BLD AUTO: 8.6 FL (ref 6–12)
POTASSIUM SERPL-SCNC: 4.1 MMOL/L (ref 3.5–5.2)
PROT SERPL-MCNC: 6.7 G/DL (ref 6–8.5)
PROT UR QL STRIP: NEGATIVE
RBC # BLD AUTO: 4.26 10*6/MM3 (ref 3.77–5.28)
RBC # UR STRIP: ABNORMAL /HPF
REF LAB TEST METHOD: ABNORMAL
SODIUM SERPL-SCNC: 136 MMOL/L (ref 136–145)
SP GR UR STRIP: 1.01 (ref 1–1.03)
SQUAMOUS #/AREA URNS HPF: ABNORMAL /HPF
UROBILINOGEN UR QL STRIP: ABNORMAL
WBC # UR STRIP: ABNORMAL /HPF
WBC NRBC COR # BLD: 6.14 10*3/MM3 (ref 3.4–10.8)

## 2023-08-29 PROCEDURE — 81001 URINALYSIS AUTO W/SCOPE: CPT | Performed by: FAMILY MEDICINE

## 2023-08-29 PROCEDURE — 85652 RBC SED RATE AUTOMATED: CPT

## 2023-08-29 PROCEDURE — 86140 C-REACTIVE PROTEIN: CPT

## 2023-08-29 PROCEDURE — 3075F SYST BP GE 130 - 139MM HG: CPT | Performed by: FAMILY MEDICINE

## 2023-08-29 PROCEDURE — 1160F RVW MEDS BY RX/DR IN RCRD: CPT | Performed by: FAMILY MEDICINE

## 2023-08-29 PROCEDURE — 85025 COMPLETE CBC W/AUTO DIFF WBC: CPT

## 2023-08-29 PROCEDURE — 99213 OFFICE O/P EST LOW 20 MIN: CPT | Performed by: FAMILY MEDICINE

## 2023-08-29 PROCEDURE — 3079F DIAST BP 80-89 MM HG: CPT | Performed by: FAMILY MEDICINE

## 2023-08-29 PROCEDURE — 1159F MED LIST DOCD IN RCRD: CPT | Performed by: FAMILY MEDICINE

## 2023-08-29 PROCEDURE — 80053 COMPREHEN METABOLIC PANEL: CPT

## 2023-08-29 PROCEDURE — 36415 COLL VENOUS BLD VENIPUNCTURE: CPT

## 2023-08-29 NOTE — PROGRESS NOTES
"Chief Complaint  Vaginal Bleeding (X 1 week ) and Urinary Incontinence    Subjective          Jaqueline Katz presents to [facility] today for acute complaint of urinary leakage for the past 7 days.  She reports negative dysuria, negative hematuria, positive frequency (but at baseline), negative urgency.  She reports negative fevers, negative chills, negative nausea, negative vomiting, negative suprapubic abdominal pain, negative back pain, negative flank pain.  She has not had UTIs in the past.    She has been going to [facility] where \"they talked me into testosterone\" pellets (placed in June).  She started to notice vaginal spotting for the past week.  +Breast tenderness.      Objective   Vital Signs:   Vitals:    08/29/23 1117   BP: 131/87   BP Location: Left arm   Patient Position: Sitting   Cuff Size: Adult   Pulse: 64   SpO2: 97%   Weight: 52 kg (114 lb 9.6 oz)   Height: 149.9 cm (59.02\")      Physical Exam  Vitals reviewed.   Constitutional:       General: She is not in acute distress.     Appearance: Normal appearance.   HENT:      Mouth/Throat:      Mouth: Mucous membranes are moist.   Eyes:      Extraocular Movements: Extraocular movements intact.      Pupils: Pupils are equal, round, and reactive to light.   Cardiovascular:      Rate and Rhythm: Normal rate and regular rhythm.      Heart sounds: No murmur heard.  Pulmonary:      Effort: Pulmonary effort is normal.      Breath sounds: Normal breath sounds. No wheezing, rhonchi or rales.   Abdominal:      Palpations: Abdomen is soft.      Tenderness: There is abdominal tenderness (mild). There is no right CVA tenderness, left CVA tenderness, guarding or rebound.   Musculoskeletal:         General: Normal range of motion.   Neurological:      Mental Status: She is alert.           Assessment and Plan    Diagnoses and all orders for this visit:    1. Vaginal spotting (Primary)  Assessment & Plan:  Symptoms do not seem " consistent with acute UTI today.  Her main issue is vaginal spotting.  She had testosterone pellets implanted about 2 and half months ago, so I suspect we are seeing some kind of withdrawal bleed.  She has also noted some breast tenderness.  She had already decided not to go back for repeat pellet implantation, which I think is a very wise decision.  We are going to give her another 4 to 6 weeks to see if the spotting will resolve on its own.  If symptoms persist, I will get her set up with GYN for further evaluation including ultrasound.  As regards her knee surgery tomorrow, there is no evidence of infection here today and she may proceed with that as planned.      2. Urinary incontinence, unspecified type  -     Urinalysis With Microscopic - Urine, Clean Catch; Future  -     Urinalysis With Culture If Indicated -; Future  -     Cancel: Urinalysis With Microscopic - Urine, Clean Catch  -     Urinalysis With Culture If Indicated - Urine, Clean Catch  -     Urinalysis, Microscopic Only - Urine, Clean Catch           Follow Up   Return if symptoms worsen or fail to improve, for Or as scheduled 10/27/2023.  Patient was given instructions and counseling regarding her condition or for health maintenance advice. Please see specific information pulled into the AVS if appropriate.         08/29/2023

## 2023-08-29 NOTE — ASSESSMENT & PLAN NOTE
Symptoms do not seem consistent with acute UTI today.  Her main issue is vaginal spotting.  She had testosterone pellets implanted about 2 and half months ago, so I suspect we are seeing some kind of withdrawal bleed.  She has also noted some breast tenderness.  She had already decided not to go back for repeat pellet implantation, which I think is a very wise decision.  We are going to give her another 4 to 6 weeks to see if the spotting will resolve on its own.  If symptoms persist, I will get her set up with GYN for further evaluation including ultrasound.  As regards her knee surgery tomorrow, there is no evidence of infection here today and she may proceed with that as planned.

## 2023-09-06 ENCOUNTER — TELEPHONE (OUTPATIENT)
Dept: FAMILY MEDICINE CLINIC | Age: 68
End: 2023-09-06
Payer: MEDICARE

## 2023-09-06 RX ORDER — ONDANSETRON 4 MG/1
4 TABLET, ORALLY DISINTEGRATING ORAL EVERY 8 HOURS PRN
Qty: 20 TABLET | Refills: 0 | Status: SHIPPED | OUTPATIENT
Start: 2023-09-06

## 2023-09-06 NOTE — TELEPHONE ENCOUNTER
Can you do me a favor? Will you ask Dr Irizarry if she would call in some Zofran ODT for my MIL Destiny Miles [birth year] to [facility] pharmacy in [place], she started vomiting today and can't hold anything down, she tried taking the zofran tablets but she keeps throwing them up. Thank you ;-)

## 2023-09-06 NOTE — TELEPHONE ENCOUNTER
Prescription sent for Zofran ODT as requested.  Please keep a close watch on symptoms and if Destiny is unable to keep down fluids or gets dehydrated, she may need to go to the ED for further evaluation.  Thanks, BZLYNNETTE

## 2023-09-18 DIAGNOSIS — K21.9 GASTROESOPHAGEAL REFLUX DISEASE WITHOUT ESOPHAGITIS: ICD-10-CM

## 2023-09-19 RX ORDER — ESOMEPRAZOLE MAGNESIUM 40 MG/1
CAPSULE, DELAYED RELEASE ORAL
Qty: 90 CAPSULE | Refills: 1 | Status: SHIPPED | OUTPATIENT
Start: 2023-09-19

## 2023-09-26 ENCOUNTER — TELEPHONE (OUTPATIENT)
Dept: FAMILY MEDICINE CLINIC | Age: 68
End: 2023-09-26
Payer: MEDICARE

## 2023-09-26 RX ORDER — SCOLOPAMINE TRANSDERMAL SYSTEM 1 MG/1
1 PATCH, EXTENDED RELEASE TRANSDERMAL
Qty: 5 PATCH | Refills: 0 | Status: SHIPPED | OUTPATIENT
Start: 2023-09-26

## 2023-09-26 NOTE — TELEPHONE ENCOUNTER
Patient is leaving for a cruise this Friday 09/29/23 and is asking if you could send her in a prescription for motion sickness patches to [facility]. Please avise

## 2023-10-11 NOTE — TELEPHONE ENCOUNTER
Pt pharmacy faxed medication refill request for Buspirone 10mg.     Pt has upcoming appt on 04/06/2023.     Medication order pended.    Admission

## 2023-10-13 DIAGNOSIS — J30.1 SEASONAL ALLERGIC RHINITIS DUE TO POLLEN: ICD-10-CM

## 2023-10-16 RX ORDER — MONTELUKAST SODIUM 10 MG/1
TABLET ORAL
Qty: 90 TABLET | Refills: 3 | Status: SHIPPED | OUTPATIENT
Start: 2023-10-16

## 2023-10-26 ENCOUNTER — TELEPHONE (OUTPATIENT)
Dept: FAMILY MEDICINE CLINIC | Age: 68
End: 2023-10-26
Payer: MEDICARE

## 2023-10-27 ENCOUNTER — OFFICE VISIT (OUTPATIENT)
Dept: FAMILY MEDICINE CLINIC | Age: 68
End: 2023-10-27
Payer: MEDICARE

## 2023-10-27 ENCOUNTER — LAB (OUTPATIENT)
Dept: LAB | Facility: HOSPITAL | Age: 68
End: 2023-10-27
Payer: MEDICARE

## 2023-10-27 VITALS
DIASTOLIC BLOOD PRESSURE: 69 MMHG | BODY MASS INDEX: 22.78 KG/M2 | OXYGEN SATURATION: 95 % | SYSTOLIC BLOOD PRESSURE: 118 MMHG | WEIGHT: 113 LBS | HEART RATE: 60 BPM | HEIGHT: 59 IN

## 2023-10-27 DIAGNOSIS — M25.569 KNEE PAIN, UNSPECIFIED CHRONICITY, UNSPECIFIED LATERALITY: ICD-10-CM

## 2023-10-27 DIAGNOSIS — M19.041 ARTHRITIS OF RIGHT HAND: ICD-10-CM

## 2023-10-27 DIAGNOSIS — M06.9 RHEUMATOID ARTHRITIS, INVOLVING UNSPECIFIED SITE, UNSPECIFIED WHETHER RHEUMATOID FACTOR PRESENT: ICD-10-CM

## 2023-10-27 DIAGNOSIS — K21.9 GASTROESOPHAGEAL REFLUX DISEASE WITHOUT ESOPHAGITIS: ICD-10-CM

## 2023-10-27 DIAGNOSIS — Z00.00 ANNUAL PHYSICAL EXAM: Primary | ICD-10-CM

## 2023-10-27 DIAGNOSIS — M05.741 RHEUMATOID ARTHRITIS INVOLVING BOTH HANDS WITH POSITIVE RHEUMATOID FACTOR: ICD-10-CM

## 2023-10-27 DIAGNOSIS — N81.10 VAGINAL PROLAPSE: ICD-10-CM

## 2023-10-27 DIAGNOSIS — F41.1 GENERALIZED ANXIETY DISORDER: ICD-10-CM

## 2023-10-27 DIAGNOSIS — I10 ESSENTIAL HYPERTENSION: ICD-10-CM

## 2023-10-27 DIAGNOSIS — K21.9 CHALASIA OF LOWER ESOPHAGEAL SPHINCTER: ICD-10-CM

## 2023-10-27 DIAGNOSIS — M81.0 AGE-RELATED OSTEOPOROSIS WITHOUT CURRENT PATHOLOGICAL FRACTURE: ICD-10-CM

## 2023-10-27 DIAGNOSIS — M81.0 OSTEOPOROSIS, UNSPECIFIED OSTEOPOROSIS TYPE, UNSPECIFIED PATHOLOGICAL FRACTURE PRESENCE: ICD-10-CM

## 2023-10-27 DIAGNOSIS — M05.742 RHEUMATOID ARTHRITIS INVOLVING BOTH HANDS WITH POSITIVE RHEUMATOID FACTOR: ICD-10-CM

## 2023-10-27 DIAGNOSIS — F33.1 MODERATE EPISODE OF RECURRENT MAJOR DEPRESSIVE DISORDER: ICD-10-CM

## 2023-10-27 PROBLEM — N93.9 VAGINAL SPOTTING: Status: RESOLVED | Noted: 2023-08-29 | Resolved: 2023-10-27

## 2023-10-27 LAB
ALBUMIN SERPL-MCNC: 4.1 G/DL (ref 3.5–5.2)
ALBUMIN/GLOB SERPL: 1.8 G/DL
ALP SERPL-CCNC: 55 U/L (ref 39–117)
ALT SERPL W P-5'-P-CCNC: 14 U/L (ref 1–33)
ANION GAP SERPL CALCULATED.3IONS-SCNC: 7.9 MMOL/L (ref 5–15)
AST SERPL-CCNC: 19 U/L (ref 1–32)
BASOPHILS # BLD AUTO: 0.03 10*3/MM3 (ref 0–0.2)
BASOPHILS NFR BLD AUTO: 0.6 % (ref 0–1.5)
BILIRUB SERPL-MCNC: 0.3 MG/DL (ref 0–1.2)
BUN SERPL-MCNC: 14 MG/DL (ref 8–23)
BUN/CREAT SERPL: 15.7 (ref 7–25)
CALCIUM SPEC-SCNC: 9.3 MG/DL (ref 8.6–10.5)
CHLORIDE SERPL-SCNC: 108 MMOL/L (ref 98–107)
CHOLEST SERPL-MCNC: 163 MG/DL (ref 0–200)
CO2 SERPL-SCNC: 25.1 MMOL/L (ref 22–29)
CREAT SERPL-MCNC: 0.89 MG/DL (ref 0.57–1)
CRP SERPL-MCNC: <0.3 MG/DL (ref 0–0.5)
DEPRECATED RDW RBC AUTO: 48.4 FL (ref 37–54)
EGFRCR SERPLBLD CKD-EPI 2021: 70.3 ML/MIN/1.73
EOSINOPHIL # BLD AUTO: 0.12 10*3/MM3 (ref 0–0.4)
EOSINOPHIL NFR BLD AUTO: 2.3 % (ref 0.3–6.2)
ERYTHROCYTE [DISTWIDTH] IN BLOOD BY AUTOMATED COUNT: 14.5 % (ref 12.3–15.4)
ERYTHROCYTE [SEDIMENTATION RATE] IN BLOOD: 1 MM/HR (ref 0–30)
GLOBULIN UR ELPH-MCNC: 2.3 GM/DL
GLUCOSE SERPL-MCNC: 85 MG/DL (ref 65–99)
HCT VFR BLD AUTO: 38.7 % (ref 34–46.6)
HDLC SERPL-MCNC: 50 MG/DL (ref 40–60)
HGB BLD-MCNC: 12.4 G/DL (ref 12–15.9)
IMM GRANULOCYTES # BLD AUTO: 0.02 10*3/MM3 (ref 0–0.05)
IMM GRANULOCYTES NFR BLD AUTO: 0.4 % (ref 0–0.5)
LDLC SERPL CALC-MCNC: 98 MG/DL (ref 0–100)
LDLC/HDLC SERPL: 1.94 {RATIO}
LYMPHOCYTES # BLD AUTO: 1.41 10*3/MM3 (ref 0.7–3.1)
LYMPHOCYTES NFR BLD AUTO: 26.6 % (ref 19.6–45.3)
MCH RBC QN AUTO: 29.3 PG (ref 26.6–33)
MCHC RBC AUTO-ENTMCNC: 32 G/DL (ref 31.5–35.7)
MCV RBC AUTO: 91.5 FL (ref 79–97)
MONOCYTES # BLD AUTO: 0.58 10*3/MM3 (ref 0.1–0.9)
MONOCYTES NFR BLD AUTO: 10.9 % (ref 5–12)
NEUTROPHILS NFR BLD AUTO: 3.15 10*3/MM3 (ref 1.7–7)
NEUTROPHILS NFR BLD AUTO: 59.2 % (ref 42.7–76)
PLATELET # BLD AUTO: 307 10*3/MM3 (ref 140–450)
PMV BLD AUTO: 9.3 FL (ref 6–12)
POTASSIUM SERPL-SCNC: 4.8 MMOL/L (ref 3.5–5.2)
PROT SERPL-MCNC: 6.4 G/DL (ref 6–8.5)
RBC # BLD AUTO: 4.23 10*6/MM3 (ref 3.77–5.28)
SODIUM SERPL-SCNC: 141 MMOL/L (ref 136–145)
TRIGL SERPL-MCNC: 79 MG/DL (ref 0–150)
VLDLC SERPL-MCNC: 15 MG/DL (ref 5–40)
WBC NRBC COR # BLD: 5.31 10*3/MM3 (ref 3.4–10.8)

## 2023-10-27 PROCEDURE — 85652 RBC SED RATE AUTOMATED: CPT

## 2023-10-27 PROCEDURE — 85025 COMPLETE CBC W/AUTO DIFF WBC: CPT

## 2023-10-27 PROCEDURE — 36415 COLL VENOUS BLD VENIPUNCTURE: CPT

## 2023-10-27 PROCEDURE — 80061 LIPID PANEL: CPT

## 2023-10-27 PROCEDURE — 80053 COMPREHEN METABOLIC PANEL: CPT

## 2023-10-27 PROCEDURE — 86140 C-REACTIVE PROTEIN: CPT

## 2023-10-27 RX ORDER — CETIRIZINE HYDROCHLORIDE 10 MG/1
TABLET ORAL
COMMUNITY

## 2023-10-27 RX ORDER — SOY PROTEIN
POWDER (GRAM) ORAL
COMMUNITY

## 2023-10-27 NOTE — ASSESSMENT & PLAN NOTE
Stable on esomeprazole 40 mg daily.  Refills were not needed today.  Continue to monitor.  Wean PPI as able.

## 2023-10-27 NOTE — ASSESSMENT & PLAN NOTE
Blood pressure has been running at goal.  Continue atenolol 25 mg daily, amlodipine 2.5 mg daily and losartan 100 mg daily.  Refills were not needed today.  Labs were needed today.  Continue to monitor.

## 2023-10-27 NOTE — ASSESSMENT & PLAN NOTE
She is UTD on colonoscopy, last done at [facility] 2/2020.  Ten year repeat recommended.  Pap smears no longer indicated by age and history.  She is UTD on mammogram, last done 11/2022 at [place] and that was normal.  She is UTD on DEXA, last done 5/2022 and this showed osteoporosis.  She is on Prolia for osteoporosis.  She is up-to-date on Pneumovax (1/2021), [id number] (1/2020), Shingrix (1/2023, 4/2023), Tdap (1/2023), flu (10/2023).  She is due for COVID; can be done at the pharmacy or here once vaccine is again available.  She is due for routine labs including lipids; ordered

## 2023-10-27 NOTE — ASSESSMENT & PLAN NOTE
Stable on buspirone 10 mg 2-3 times daily and duloxetine 60 mg daily.  Refills were not needed today.  Continue to monitor.

## 2023-10-27 NOTE — ASSESSMENT & PLAN NOTE
Vaginal prolapse obvious on exam today.  Referral placed to [facility] (Dr. Joyce or Dr. Guillaume) for further evaluation and treatment.   No

## 2023-10-27 NOTE — PROGRESS NOTES
The ABCs of the Annual Wellness Visit  Subsequent Medicare Wellness Visit    Subjective    Jaqueline Katz is a 68 y.o. female who presents for a Subsequent Medicare Wellness Visit.    She is UTD on colonoscopy, last done at [facility] 2/2020.  Ten year repeat recommended.  Pap smears no longer indicated by age and history.  She is UTD on mammogram, last done 11/2022 at [place] and that was normal.  She is UTD on DEXA, last done 5/2022 and this showed osteoporosis.  She is on Prolia for osteoporosis.  She is up-to-date on Pneumovax (1/2021), [id number] (1/2020), Shingrix (1/2023, 4/2023), Tdap (1/2023), flu (10/2023).  She is due for COVID.  She is due for routine labs including lipids.     The following portions of the patient's history were reviewed and   updated as appropriate: allergies, current medications, past family history, past medical history, past social history, past surgical history, and problem list.    Compared to one year ago, the patient feels her physical   health is the same.    Compared to one year ago, the patient feels her mental   health is the same.    Recent Hospitalizations:  She was not admitted to the hospital during the last year.       Current Medical Providers:  Patient Care Team:  Rosalinda Irizarry MD as PCP - General (Family Medicine)  Zoie Winter APRN as Nurse Practitioner (Behavioral Health)  Tony Faria MD as Consulting Physician (Rheumatology)    Outpatient Medications Prior to Visit   Medication Sig Dispense Refill    amLODIPine (NORVASC) 2.5 MG tablet TAKE ONE TABLET BY MOUTH EVERY DAY FOR BLOOD PRESSURE 90 tablet 1    atenolol (TENORMIN) 25 MG tablet TAKE TWO TABLETS BY MOUTH EVERY  tablet 1    busPIRone (BUSPAR) 10 MG tablet Take 1 tablet by mouth 3 (Three) Times a Day. Indications: Anxiety Disorder 90 tablet 5    cetirizine (zyrTEC) 10 MG tablet Take 1 tablet every day by oral route.      Cholecalciferol 100 MCG (4000 UT) capsule Take 1 capsule  every day by oral route.      Cobalamin Combinations (Vitamin B12-Folic Acid) 500-400 MCG tablet Take 1 tablet every day by oral route.      denosumab (Prolia) 60 MG/ML solution prefilled syringe syringe 1 mL Every 6 (Six) Months.      DULoxetine (CYMBALTA) 60 MG capsule TAKE ONE CAPSULE BY MOUTH TWICE DAILY 180 capsule 1    esomeprazole (nexIUM) 40 MG capsule TAKE ONE CAPSULE BY MOUTH EVERY DAY 90 capsule 1    folic acid (FOLVITE) 1 MG tablet Take 1 tablet by mouth Daily.      losartan (COZAAR) 100 MG tablet TAKE ONE TABLET BY MOUTH EVERY DAY 90 tablet 1    methotrexate 2.5 MG tablet Take 8 tablets by mouth 1 (One) Time Per Week.      montelukast (SINGULAIR) 10 MG tablet TAKE ONE TABLET BY MOUTH EVERY EVENING 90 tablet 3    olopatadine (PATANOL) 0.1 % ophthalmic solution Patanol 0.1 % eye drops   INSTILL 1 DROP INTO AFFECTED EYE(S) BY OPHTHALMIC ROUTE 2 TIMES PER DAY AT AN INTERVAL OF 6 TO 8 HOURS      ondansetron ODT (ZOFRAN-ODT) 4 MG disintegrating tablet Place 1 tablet on the tongue Every 8 (Eight) Hours As Needed for Nausea or Vomiting. 20 tablet 0    Scopolamine 1 MG/3DAYS patch Place 1 patch on the skin as directed by provider Every 72 (Seventy-Two) Hours. (Patient not taking: Reported on 10/27/2023) 5 patch 0     Facility-Administered Medications Prior to Visit   Medication Dose Route Frequency Provider Last Rate Last Admin    denosumab (PROLIA) syringe 60 mg  60 mg Subcutaneous Q6 Months Rosalinda Irizarry MD   60 mg at 05/15/23 1307       No opioid medication identified on active medication list. I have reviewed chart for other potential  high risk medication/s and harmful drug interactions in the elderly.        Aspirin is not on active medication list.  Aspirin use is not indicated based on review of current medical condition/s. Risk of harm outweighs potential benefits.  .    Patient Active Problem List   Diagnosis    Essential hypertension    Gastroesophageal reflux disease without esophagitis     "Primary osteoarthritis involving multiple joints    Moderate episode of recurrent major depressive disorder    Generalized anxiety disorder    Other irritable bowel syndrome    Age-related osteoporosis without current pathological fracture    Rheumatoid arthritis involving both hands with positive rheumatoid factor (HCC)    Annual physical exam    Seasonal allergic rhinitis due to pollen    Right hip pain    Vaginal prolapse     Advance Care Planning   Advance Care Planning     Advance Directive is not on file.  ACP discussion was held with the patient during this visit. Patient does not have an advance directive, information provided.     Objective    Vitals:    10/27/23 1034   BP: 118/69   BP Location: Left arm   Patient Position: Sitting   Cuff Size: Adult   Pulse: 60   SpO2: 95%   Weight: 51.3 kg (113 lb)   Height: 149.9 cm (59.02\")     Estimated body mass index is 22.81 kg/m² as calculated from the following:    Height as of this encounter: 149.9 cm (59.02\").    Weight as of this encounter: 51.3 kg (113 lb).    BMI is within normal parameters. No other follow-up for BMI required.      Does the patient have evidence of cognitive impairment? No          HEALTH RISK ASSESSMENT    Smoking Status:  Social History     Tobacco Use   Smoking Status Former    Packs/day: 1.50    Years: 25.00    Additional pack years: 0.00    Total pack years: 37.50    Types: Cigarettes    Quit date: 2010    Years since quittin.8   Smokeless Tobacco Never     Alcohol Consumption:  Social History     Substance and Sexual Activity   Alcohol Use Yes    Alcohol/week: 1.0 standard drink of alcohol    Types: 1 Glasses of wine per week    Comment: Wine maybe twice a week at dinner     Fall Risk Screen:    ROSANNE Fall Risk Assessment was completed, and patient is at LOW risk for falls.Assessment completed on:10/27/2023    Depression Screening:      10/27/2023    10:38 AM   PHQ-2/PHQ-9 Depression Screening   Little Interest or Pleasure in " Doing Things 0-->not at all   Feeling Down, Depressed or Hopeless 0-->not at all   PHQ-9: Brief Depression Severity Measure Score 0       Health Habits and Functional and Cognitive Screening:      10/27/2023    10:39 AM   Functional & Cognitive Status   Do you have difficulty preparing food and eating? No   Do you have difficulty bathing yourself, getting dressed or grooming yourself? No   Do you have difficulty using the toilet? No   Do you have difficulty moving around from place to place? No   Do you have trouble with steps or getting out of a bed or a chair? No   Current Diet Well Balanced Diet   Dental Exam Up to date   Eye Exam Up to date   Exercise (times per week) 3 times per week   Current Exercises Include Walking   Do you need help using the phone?  No   Are you deaf or do you have serious difficulty hearing?  No   Do you need help to go to places out of walking distance? No   Do you need help shopping? No   Do you need help preparing meals?  No   Do you need help with housework?  No   Do you need help with laundry? No   Do you need help taking your medications? No   Do you need help managing money? No   Do you ever drive or ride in a car without wearing a seat belt? No   Have you felt unusual stress, anger or loneliness in the last month? No   Who do you live with? Spouse   If you need help, do you have trouble finding someone available to you? No   Have you been bothered in the last four weeks by sexual problems? No   Do you have difficulty concentrating, remembering or making decisions? No       Age-appropriate Screening Schedule:  Refer to the list below for future screening recommendations based on patient's age, sex and/or medical conditions. Orders for these recommended tests are listed in the plan section. The patient has been provided with a written plan.    Health Maintenance   Topic Date Due    COVID-19 Vaccine (5 - 2023-24 season) 01/16/2024 (Originally 9/1/2023)    LUNG CANCER SCREENING   04/03/2024    DXA SCAN  05/04/2024    ANNUAL WELLNESS VISIT  10/27/2024    MAMMOGRAM  11/29/2024    COLORECTAL CANCER SCREENING  02/20/2030    TDAP/TD VACCINES (3 - Td or Tdap) 01/18/2033    HEPATITIS C SCREENING  Completed    INFLUENZA VACCINE  Completed    Pneumococcal Vaccine 65+  Completed    ZOSTER VACCINE  Completed                  CMS Preventative Services Quick Reference  Risk Factors Identified During Encounter  Immunizations Discussed/Encouraged: COVID19  The above risks/problems have been discussed with the patient.  Pertinent information has been shared with the patient in the After Visit Summary.  An After Visit Summary and PPPS were made available to the patient.    Follow Up:   Next Medicare Wellness visit to be scheduled in 1 year.       Additional E&M Note during same encounter follows:  Patient has multiple medical problems which are significant and separately identifiable that require additional work above and beyond the Medicare Wellness Visit.      Chief Complaint  Medicare Wellness-subsequent    Subjective        HPI  Jaqueline Katz is also being seen today for routine f/u of chronic issues.    She is noticing vaginal prolapse anytime she is standing or walking.  Most noticeable when she is going to the bathroom.  She has had some R flank pain but no pain in the vaginal.  She is maybe having some feelings of urinary retention but it is hard for her to tell.      She has been struggling with R hip pain intermittently for years, but it has been more consistent for the past 3 months (when she was doing a lot of walking down in [place]).  She has been seeing the chiropractor and it helps temporarily, but the pain always comes back.  She is on methotrexate for rheumatoid arthritis, and she follows with Dr. [facility] in [place].     She is on atenolol and losartan for HTN.  BP has been well controlled.  No denies chest pain, palpitations or shortness of breath.     She is on duloxetine and  "buspirone for anxiety and depression.  She has been seen by Zhanna Winter in the past but is no longer seeing her as she has been quite stable on her current regimen.  No anxiety or panic attacks, no depressed mood or anhedonia.     She is on esomeprazole for GERD.  Status post EGD with Dr. [facility] at [place] that reportedly showed gastritis.       She is on Prolia for osteoporosis.  She is up-to-date on DEXA, last done 5/2022 and this showed osteoporosis.       Review of Systems   Constitutional:  Negative for chills, fatigue and fever.   HENT:  Negative for congestion, hearing loss and rhinorrhea.    Eyes:  Negative for pain and visual disturbance.   Respiratory:  Negative for cough and shortness of breath.    Cardiovascular:  Negative for chest pain and palpitations.   Gastrointestinal:  Negative for abdominal pain, constipation, diarrhea, nausea and vomiting.   Genitourinary:  Negative for difficulty urinating and dysuria.        \"Something's dropping out\"   Musculoskeletal:  Positive for arthralgias. Negative for myalgias.   Neurological:  Negative for weakness and numbness.   Psychiatric/Behavioral:  Negative for dysphoric mood and sleep disturbance. The patient is not nervous/anxious.        Objective   Vital Signs:  /69 (BP Location: Left arm, Patient Position: Sitting, Cuff Size: Adult)   Pulse 60   Ht 149.9 cm (59.02\")   Wt 51.3 kg (113 lb)   SpO2 95%   BMI 22.81 kg/m²     Physical Exam  Vitals reviewed.   Constitutional:       General: She is not in acute distress.     Appearance: Normal appearance. She is well-developed.   HENT:      Head: Normocephalic and atraumatic.      Right Ear: External ear normal.      Left Ear: External ear normal.      Mouth/Throat:      Mouth: Mucous membranes are moist.   Eyes:      Extraocular Movements: Extraocular movements intact.      Conjunctiva/sclera: Conjunctivae normal.      Pupils: Pupils are equal, round, and reactive to light. "   Cardiovascular:      Rate and Rhythm: Normal rate and regular rhythm.      Heart sounds: No murmur heard.  Pulmonary:      Effort: Pulmonary effort is normal.      Breath sounds: Normal breath sounds. No wheezing, rhonchi or rales.   Abdominal:      General: Bowel sounds are normal. There is no distension.      Palpations: Abdomen is soft.      Tenderness: There is no abdominal tenderness.   Genitourinary:     Comments: +Vaginal prolapse  Musculoskeletal:         General: Normal range of motion.   Skin:     General: Skin is warm and dry.   Neurological:      Mental Status: She is alert and oriented to person, place, and time.      Deep Tendon Reflexes: Reflexes normal.   Psychiatric:         Mood and Affect: Mood and affect normal.         Behavior: Behavior normal.         Thought Content: Thought content normal.         Judgment: Judgment normal.        Lab Results   Component Value Date    GLUCOSE 63 (L) 08/29/2023    BUN 16 08/29/2023    CREATININE 0.89 08/29/2023    EGFR 70.7 08/29/2023    BCR 18.0 08/29/2023    K 4.1 08/29/2023    CO2 24.1 08/29/2023    CALCIUM 9.3 08/29/2023    ALBUMIN 4.1 08/29/2023    BILITOT 0.3 08/29/2023    AST 18 08/29/2023    ALT 14 08/29/2023       Lab Results   Component Value Date    CHOL 189 11/11/2022    CHLPL 215 (H) 03/24/2021    TRIG 98 11/11/2022    HDL 49 11/11/2022     (H) 11/11/2022       Lab Results   Component Value Date    WBC 6.14 08/29/2023    HGB 12.8 08/29/2023    HCT 39.3 08/29/2023    MCV 92.3 08/29/2023     08/29/2023                        Assessment and Plan   Diagnoses and all orders for this visit:    1. Annual physical exam (Primary)  Assessment & Plan:  She is UTD on colonoscopy, last done at [facility] 2/2020.  Ten year repeat recommended.  Pap smears no longer indicated by age and history.  She is UTD on mammogram, last done 11/2022 at [facility] and that was normal.  She is UTD on DEXA, last done 5/2022 and this showed  osteoporosis.  She is on Prolia for osteoporosis.  She is up-to-date on Pneumovax (1/2021), [id number] (1/2020), Shingrix (1/2023, 4/2023), Tdap (1/2023), flu (10/2023).  She is due for COVID; can be done at the pharmacy or here once vaccine is again available.  She is due for routine labs including lipids; ordered      2. Vaginal prolapse  Assessment & Plan:  Vaginal prolapse obvious on exam today.  Referral placed to [facility] (Dr. Joyce or Dr. Guillaume) for further evaluation and treatment.    Orders:  -     Ambulatory Referral to Urology    3. Essential hypertension  Assessment & Plan:  Blood pressure has been running at goal.  Continue atenolol 25 mg daily, amlodipine 2.5 mg daily and losartan 100 mg daily.  Refills were not needed today.  Labs were needed today.  Continue to monitor.      Orders:  -     Lipid Panel; Future    4. Generalized anxiety disorder  Assessment & Plan:  Stable on buspirone 10 mg 2-3 times daily and duloxetine 60 mg daily.  Refills were not needed today.  Continue to monitor.        5. Moderate episode of recurrent major depressive disorder  Assessment & Plan:  As per anxiety plan.      6. Rheumatoid arthritis involving both hands with positive rheumatoid factor (HCC)  Overview:  Following with Dr. [facility] in [place].  On methotrexate.      7. Gastroesophageal reflux disease without esophagitis  Assessment & Plan:  Stable on esomeprazole 40 mg daily.  Refills were not needed today.  Continue to monitor.  Wean PPI as able.        8. Age-related osteoporosis without current pathological fracture  Overview:  On Prolia.  DEXA is up-to-date, last done 5/2022 showing osteoporosis.                 Follow Up   Return in about 6 months (around 4/27/2024) for Recheck.  Patient was given instructions and counseling regarding her condition or for health maintenance advice. Please see specific information pulled into the AVS if appropriate.

## 2023-11-10 ENCOUNTER — TELEPHONE (OUTPATIENT)
Dept: FAMILY MEDICINE CLINIC | Age: 68
End: 2023-11-10

## 2023-11-10 DIAGNOSIS — N81.10 VAGINAL PROLAPSE: Primary | ICD-10-CM

## 2023-11-10 NOTE — TELEPHONE ENCOUNTER
I found a Dr. Anne Marie Joe who is in Urology in [place], so I think that's who she probably meant.  Referral placed.  Thanks, BZLYNNETTE

## 2023-11-10 NOTE — TELEPHONE ENCOUNTER
"Caller: Jaqueline Katz \"Destiny\"    Relationship: Self    Best call back number: [phone]     What specialty or service is being requested: URO GYNECOLOGIST    What is the provider, practice or medical service name: DR. MARLENY HARRINGTON    What is the office location: [place]    What is the office phone number: FAX: [phone]  PHONE NUMBE: [phone]    Any additional details: PATIENT STATES THIS OFFICE CAN GET HER IN QUICKLY, BUT THEY NEED A REFERRAL AND HER MEDICAL RECORDS FIRST.    "

## 2023-11-14 ENCOUNTER — CLINICAL SUPPORT (OUTPATIENT)
Dept: FAMILY MEDICINE CLINIC | Age: 68
End: 2023-11-14
Payer: MEDICARE

## 2023-11-14 DIAGNOSIS — M81.0 AGE-RELATED OSTEOPOROSIS WITHOUT CURRENT PATHOLOGICAL FRACTURE: Primary | ICD-10-CM

## 2023-12-01 ENCOUNTER — TELEPHONE (OUTPATIENT)
Dept: FAMILY MEDICINE CLINIC | Age: 68
End: 2023-12-01
Payer: MEDICARE

## 2023-12-01 DIAGNOSIS — Z12.31 ENCOUNTER FOR SCREENING MAMMOGRAM FOR MALIGNANT NEOPLASM OF BREAST: Primary | ICD-10-CM

## 2023-12-01 NOTE — TELEPHONE ENCOUNTER
----- Message from Rosalinda Irizarry MD sent at 12/1/2022 12:35 PM EST -----  Regarding: f/u screening mammo  Please call pt to let her know that she is due for her yearly screening mammogram.  Please pend order for screening mammo and send to me.  Thanks, JONATHAN

## 2023-12-14 DIAGNOSIS — I10 ESSENTIAL HYPERTENSION: ICD-10-CM

## 2023-12-14 RX ORDER — LOSARTAN POTASSIUM 100 MG/1
TABLET ORAL
Qty: 90 TABLET | Refills: 1 | Status: SHIPPED | OUTPATIENT
Start: 2023-12-14

## 2023-12-19 DIAGNOSIS — F41.1 GENERALIZED ANXIETY DISORDER: Chronic | ICD-10-CM

## 2023-12-19 RX ORDER — BUSPIRONE HYDROCHLORIDE 10 MG/1
10 TABLET ORAL 3 TIMES DAILY
Qty: 90 TABLET | Refills: 5 | Status: SHIPPED | OUTPATIENT
Start: 2023-12-19

## 2023-12-21 ENCOUNTER — LAB (OUTPATIENT)
Dept: LAB | Facility: HOSPITAL | Age: 68
End: 2023-12-21
Payer: MEDICARE

## 2023-12-21 ENCOUNTER — TRANSCRIBE ORDERS (OUTPATIENT)
Dept: ADMINISTRATIVE | Facility: HOSPITAL | Age: 68
End: 2023-12-21
Payer: MEDICARE

## 2023-12-21 DIAGNOSIS — Z79.899 ENCOUNTER FOR LONG-TERM (CURRENT) USE OF OTHER MEDICATIONS: ICD-10-CM

## 2023-12-21 DIAGNOSIS — M06.9 RHEUMATOID ARTHRITIS, INVOLVING UNSPECIFIED SITE, UNSPECIFIED WHETHER RHEUMATOID FACTOR PRESENT: ICD-10-CM

## 2023-12-21 DIAGNOSIS — M06.9 RHEUMATOID ARTHRITIS, INVOLVING UNSPECIFIED SITE, UNSPECIFIED WHETHER RHEUMATOID FACTOR PRESENT: Primary | ICD-10-CM

## 2023-12-21 LAB
ALBUMIN SERPL-MCNC: 4 G/DL (ref 3.5–5.2)
ALP SERPL-CCNC: 49 U/L (ref 39–117)
ALT SERPL W P-5'-P-CCNC: 15 U/L (ref 1–33)
AST SERPL-CCNC: 21 U/L (ref 1–32)
BASOPHILS # BLD AUTO: 0.09 10*3/MM3 (ref 0–0.2)
BASOPHILS NFR BLD AUTO: 1.3 % (ref 0–1.5)
BILIRUB CONJ SERPL-MCNC: <0.2 MG/DL (ref 0–0.3)
BILIRUB INDIRECT SERPL-MCNC: NORMAL MG/DL
BILIRUB SERPL-MCNC: 0.5 MG/DL (ref 0–1.2)
CREAT SERPL-MCNC: 0.81 MG/DL (ref 0.57–1)
CRP SERPL-MCNC: <0.3 MG/DL (ref 0–0.5)
DEPRECATED RDW RBC AUTO: 49.6 FL (ref 37–54)
EGFRCR SERPLBLD CKD-EPI 2021: 78.7 ML/MIN/1.73
EOSINOPHIL # BLD AUTO: 0.08 10*3/MM3 (ref 0–0.4)
EOSINOPHIL NFR BLD AUTO: 1.1 % (ref 0.3–6.2)
ERYTHROCYTE [DISTWIDTH] IN BLOOD BY AUTOMATED COUNT: 15.1 % (ref 12.3–15.4)
ERYTHROCYTE [SEDIMENTATION RATE] IN BLOOD: <1 MM/HR (ref 0–30)
HCT VFR BLD AUTO: 38.8 % (ref 34–46.6)
HGB BLD-MCNC: 12.3 G/DL (ref 12–15.9)
IMM GRANULOCYTES # BLD AUTO: 0.01 10*3/MM3 (ref 0–0.05)
IMM GRANULOCYTES NFR BLD AUTO: 0.1 % (ref 0–0.5)
LYMPHOCYTES # BLD AUTO: 1.55 10*3/MM3 (ref 0.7–3.1)
LYMPHOCYTES NFR BLD AUTO: 21.9 % (ref 19.6–45.3)
MCH RBC QN AUTO: 28.8 PG (ref 26.6–33)
MCHC RBC AUTO-ENTMCNC: 31.7 G/DL (ref 31.5–35.7)
MCV RBC AUTO: 90.9 FL (ref 79–97)
MONOCYTES # BLD AUTO: 0.43 10*3/MM3 (ref 0.1–0.9)
MONOCYTES NFR BLD AUTO: 6.1 % (ref 5–12)
NEUTROPHILS NFR BLD AUTO: 4.91 10*3/MM3 (ref 1.7–7)
NEUTROPHILS NFR BLD AUTO: 69.5 % (ref 42.7–76)
PLATELET # BLD AUTO: 323 10*3/MM3 (ref 140–450)
PMV BLD AUTO: 9.4 FL (ref 6–12)
PROT SERPL-MCNC: 6.7 G/DL (ref 6–8.5)
RBC # BLD AUTO: 4.27 10*6/MM3 (ref 3.77–5.28)
WBC NRBC COR # BLD AUTO: 7.07 10*3/MM3 (ref 3.4–10.8)

## 2023-12-21 PROCEDURE — 36415 COLL VENOUS BLD VENIPUNCTURE: CPT

## 2023-12-21 PROCEDURE — 82565 ASSAY OF CREATININE: CPT

## 2023-12-21 PROCEDURE — 85652 RBC SED RATE AUTOMATED: CPT

## 2023-12-21 PROCEDURE — 80076 HEPATIC FUNCTION PANEL: CPT

## 2023-12-21 PROCEDURE — 86140 C-REACTIVE PROTEIN: CPT

## 2023-12-21 PROCEDURE — 85025 COMPLETE CBC W/AUTO DIFF WBC: CPT

## 2024-01-03 DIAGNOSIS — I10 ESSENTIAL HYPERTENSION: ICD-10-CM

## 2024-01-03 DIAGNOSIS — F41.1 GENERALIZED ANXIETY DISORDER: ICD-10-CM

## 2024-01-03 DIAGNOSIS — F33.1 MODERATE EPISODE OF RECURRENT MAJOR DEPRESSIVE DISORDER: ICD-10-CM

## 2024-01-03 RX ORDER — DULOXETIN HYDROCHLORIDE 60 MG/1
CAPSULE, DELAYED RELEASE ORAL
Qty: 180 CAPSULE | Refills: 1 | Status: SHIPPED | OUTPATIENT
Start: 2024-01-03

## 2024-01-03 RX ORDER — ATENOLOL 25 MG/1
TABLET ORAL
Qty: 180 TABLET | Refills: 1 | Status: SHIPPED | OUTPATIENT
Start: 2024-01-03

## 2024-01-17 DIAGNOSIS — Z12.31 ENCOUNTER FOR SCREENING MAMMOGRAM FOR MALIGNANT NEOPLASM OF BREAST: ICD-10-CM

## 2024-02-02 ENCOUNTER — LAB (OUTPATIENT)
Dept: LAB | Facility: HOSPITAL | Age: 69
End: 2024-02-02
Payer: MEDICARE

## 2024-02-02 ENCOUNTER — TRANSCRIBE ORDERS (OUTPATIENT)
Dept: ADMINISTRATIVE | Facility: HOSPITAL | Age: 69
End: 2024-02-02
Payer: MEDICARE

## 2024-02-02 DIAGNOSIS — Z79.899 ENCOUNTER FOR LONG-TERM (CURRENT) USE OF OTHER MEDICATIONS: ICD-10-CM

## 2024-02-02 DIAGNOSIS — M06.9 RHEUMATOID ARTHRITIS, INVOLVING UNSPECIFIED SITE, UNSPECIFIED WHETHER RHEUMATOID FACTOR PRESENT: ICD-10-CM

## 2024-02-02 DIAGNOSIS — M06.9 RHEUMATOID ARTHRITIS, INVOLVING UNSPECIFIED SITE, UNSPECIFIED WHETHER RHEUMATOID FACTOR PRESENT: Primary | ICD-10-CM

## 2024-02-02 LAB
ALBUMIN SERPL-MCNC: 3.9 G/DL (ref 3.5–5.2)
ALBUMIN/GLOB SERPL: 1.6 G/DL
ALP SERPL-CCNC: 43 U/L (ref 39–117)
ALT SERPL W P-5'-P-CCNC: 16 U/L (ref 1–33)
ANION GAP SERPL CALCULATED.3IONS-SCNC: 10.7 MMOL/L (ref 5–15)
AST SERPL-CCNC: 18 U/L (ref 1–32)
BASOPHILS # BLD AUTO: 0.05 10*3/MM3 (ref 0–0.2)
BASOPHILS NFR BLD AUTO: 0.9 % (ref 0–1.5)
BILIRUB SERPL-MCNC: <0.2 MG/DL (ref 0–1.2)
BUN SERPL-MCNC: 18 MG/DL (ref 8–23)
BUN/CREAT SERPL: 20.9 (ref 7–25)
CALCIUM SPEC-SCNC: 8.6 MG/DL (ref 8.6–10.5)
CHLORIDE SERPL-SCNC: 105 MMOL/L (ref 98–107)
CO2 SERPL-SCNC: 24.3 MMOL/L (ref 22–29)
CREAT SERPL-MCNC: 0.86 MG/DL (ref 0.57–1)
CRP SERPL-MCNC: <0.3 MG/DL (ref 0–0.5)
DEPRECATED RDW RBC AUTO: 51.1 FL (ref 37–54)
EGFRCR SERPLBLD CKD-EPI 2021: 73.2 ML/MIN/1.73
EOSINOPHIL # BLD AUTO: 0.13 10*3/MM3 (ref 0–0.4)
EOSINOPHIL NFR BLD AUTO: 2.4 % (ref 0.3–6.2)
ERYTHROCYTE [DISTWIDTH] IN BLOOD BY AUTOMATED COUNT: 15.3 % (ref 12.3–15.4)
ERYTHROCYTE [SEDIMENTATION RATE] IN BLOOD: <1 MM/HR (ref 0–30)
GLOBULIN UR ELPH-MCNC: 2.5 GM/DL
GLUCOSE SERPL-MCNC: 156 MG/DL (ref 65–99)
HCT VFR BLD AUTO: 35.3 % (ref 34–46.6)
HGB BLD-MCNC: 11.1 G/DL (ref 12–15.9)
IMM GRANULOCYTES # BLD AUTO: 0.02 10*3/MM3 (ref 0–0.05)
IMM GRANULOCYTES NFR BLD AUTO: 0.4 % (ref 0–0.5)
LYMPHOCYTES # BLD AUTO: 1.55 10*3/MM3 (ref 0.7–3.1)
LYMPHOCYTES NFR BLD AUTO: 29.1 % (ref 19.6–45.3)
MCH RBC QN AUTO: 28.8 PG (ref 26.6–33)
MCHC RBC AUTO-ENTMCNC: 31.4 G/DL (ref 31.5–35.7)
MCV RBC AUTO: 91.5 FL (ref 79–97)
MONOCYTES # BLD AUTO: 0.57 10*3/MM3 (ref 0.1–0.9)
MONOCYTES NFR BLD AUTO: 10.7 % (ref 5–12)
NEUTROPHILS NFR BLD AUTO: 3 10*3/MM3 (ref 1.7–7)
NEUTROPHILS NFR BLD AUTO: 56.5 % (ref 42.7–76)
PLATELET # BLD AUTO: 271 10*3/MM3 (ref 140–450)
PMV BLD AUTO: 9.1 FL (ref 6–12)
POTASSIUM SERPL-SCNC: 4.3 MMOL/L (ref 3.5–5.2)
PROT SERPL-MCNC: 6.4 G/DL (ref 6–8.5)
RBC # BLD AUTO: 3.86 10*6/MM3 (ref 3.77–5.28)
SODIUM SERPL-SCNC: 140 MMOL/L (ref 136–145)
WBC NRBC COR # BLD AUTO: 5.32 10*3/MM3 (ref 3.4–10.8)

## 2024-02-02 PROCEDURE — 85652 RBC SED RATE AUTOMATED: CPT

## 2024-02-02 PROCEDURE — 80053 COMPREHEN METABOLIC PANEL: CPT

## 2024-02-02 PROCEDURE — 85025 COMPLETE CBC W/AUTO DIFF WBC: CPT

## 2024-02-02 PROCEDURE — 86140 C-REACTIVE PROTEIN: CPT

## 2024-02-02 PROCEDURE — 36415 COLL VENOUS BLD VENIPUNCTURE: CPT

## 2024-02-26 ENCOUNTER — PATIENT MESSAGE (OUTPATIENT)
Dept: FAMILY MEDICINE CLINIC | Age: 69
End: 2024-02-26
Payer: MEDICARE

## 2024-02-26 DIAGNOSIS — D64.9 ANEMIA, UNSPECIFIED TYPE: Primary | ICD-10-CM

## 2024-02-26 NOTE — LETTER
February 27, 2024     Patient: Jaqueline Katz   YOB: 1955   Date of Visit: 2/26/2024       To Whom It May Concern:    Jaqueline Katz is my patient, and has been under my care since 3/18/2021. I am intimately familiar with her history and with the functional limitations imposed by her disability.  She meets the definition of disability under the Americans with Disabilities Act, the Fair Housing Act, and the Rehabilitation Act of 1973.    Due to mental illness, Jaqueline has certain limitations regarding coping with stress/anxiety. In order to help alleviate these difficulties, and to enhance her ability to live independently and to fully use and enjoy the dwelling unit you own and/or administer, I am prescribing an emotional support animal that will assist Jaqueline in coping with her disability.    Please feel free to contact me with any questions or concerns.    Sincerely,            Rosalinda Irizarry MD  [facility], Family Medicine -- [place]  [phone]

## 2024-02-27 NOTE — TELEPHONE ENCOUNTER
Unfortunately, I do not have any openings tomorrow.  I can put in labs for an initial evaluation of the anemia that she can get done tomorrow while she is in town, however, and then we can get her a f/u in an acute spot to follow-up on the results and discuss next steps.  Thanks, JONATHAN

## 2024-02-28 ENCOUNTER — LAB (OUTPATIENT)
Dept: LAB | Facility: HOSPITAL | Age: 69
End: 2024-02-28
Payer: MEDICARE

## 2024-02-28 DIAGNOSIS — D64.9 ANEMIA, UNSPECIFIED TYPE: ICD-10-CM

## 2024-02-28 LAB
BASOPHILS # BLD AUTO: 0.07 10*3/MM3 (ref 0–0.2)
BASOPHILS NFR BLD AUTO: 1.1 % (ref 0–1.5)
DEPRECATED RDW RBC AUTO: 51.4 FL (ref 37–54)
EOSINOPHIL # BLD AUTO: 0.05 10*3/MM3 (ref 0–0.4)
EOSINOPHIL NFR BLD AUTO: 0.8 % (ref 0.3–6.2)
ERYTHROCYTE [DISTWIDTH] IN BLOOD BY AUTOMATED COUNT: 15.4 % (ref 12.3–15.4)
FERRITIN SERPL-MCNC: 12.4 NG/ML (ref 13–150)
FOLATE SERPL-MCNC: >20 NG/ML (ref 4.78–24.2)
HCT VFR BLD AUTO: 35.7 % (ref 34–46.6)
HGB BLD-MCNC: 11.4 G/DL (ref 12–15.9)
IMM GRANULOCYTES # BLD AUTO: 0.01 10*3/MM3 (ref 0–0.05)
IMM GRANULOCYTES NFR BLD AUTO: 0.2 % (ref 0–0.5)
IRON 24H UR-MRATE: 86 MCG/DL (ref 37–145)
IRON SATN MFR SERPL: 19 % (ref 20–50)
LYMPHOCYTES # BLD AUTO: 1.35 10*3/MM3 (ref 0.7–3.1)
LYMPHOCYTES NFR BLD AUTO: 21.1 % (ref 19.6–45.3)
MCH RBC QN AUTO: 29.1 PG (ref 26.6–33)
MCHC RBC AUTO-ENTMCNC: 31.9 G/DL (ref 31.5–35.7)
MCV RBC AUTO: 91.1 FL (ref 79–97)
MONOCYTES # BLD AUTO: 0.69 10*3/MM3 (ref 0.1–0.9)
MONOCYTES NFR BLD AUTO: 10.8 % (ref 5–12)
NEUTROPHILS NFR BLD AUTO: 4.22 10*3/MM3 (ref 1.7–7)
NEUTROPHILS NFR BLD AUTO: 66 % (ref 42.7–76)
PLATELET # BLD AUTO: 302 10*3/MM3 (ref 140–450)
PMV BLD AUTO: 9.6 FL (ref 6–12)
RBC # BLD AUTO: 3.92 10*6/MM3 (ref 3.77–5.28)
TIBC SERPL-MCNC: 463 MCG/DL (ref 298–536)
TRANSFERRIN SERPL-MCNC: 311 MG/DL (ref 200–360)
VIT B12 BLD-MCNC: 1381 PG/ML (ref 211–946)
WBC NRBC COR # BLD AUTO: 6.39 10*3/MM3 (ref 3.4–10.8)

## 2024-02-28 PROCEDURE — 82728 ASSAY OF FERRITIN: CPT

## 2024-02-28 PROCEDURE — 84466 ASSAY OF TRANSFERRIN: CPT

## 2024-02-28 PROCEDURE — 83540 ASSAY OF IRON: CPT

## 2024-02-28 PROCEDURE — 85025 COMPLETE CBC W/AUTO DIFF WBC: CPT

## 2024-02-28 PROCEDURE — 82746 ASSAY OF FOLIC ACID SERUM: CPT

## 2024-02-28 PROCEDURE — 82607 VITAMIN B-12: CPT

## 2024-02-28 PROCEDURE — 36415 COLL VENOUS BLD VENIPUNCTURE: CPT

## 2024-03-01 DIAGNOSIS — I10 ESSENTIAL HYPERTENSION: ICD-10-CM

## 2024-03-04 RX ORDER — AMLODIPINE BESYLATE 2.5 MG/1
TABLET ORAL
Qty: 90 TABLET | Refills: 1 | Status: SHIPPED | OUTPATIENT
Start: 2024-03-04

## 2024-03-05 DIAGNOSIS — K21.9 GASTROESOPHAGEAL REFLUX DISEASE WITHOUT ESOPHAGITIS: ICD-10-CM

## 2024-03-05 RX ORDER — ESOMEPRAZOLE MAGNESIUM 40 MG/1
CAPSULE, DELAYED RELEASE ORAL
Qty: 90 CAPSULE | Refills: 1 | Status: SHIPPED | OUTPATIENT
Start: 2024-03-05

## 2024-03-11 ENCOUNTER — TELEPHONE (OUTPATIENT)
Dept: FAMILY MEDICINE CLINIC | Age: 69
End: 2024-03-11
Payer: MEDICARE

## 2024-03-11 NOTE — TELEPHONE ENCOUNTER
"  Caller: Jaqueline Katz \"Destiny\"    Relationship: Self    Best call back number: [phone]     What is the best time to reach you: ANYTIME     Who are you requesting to speak with (clinical staff, provider,  specific staff member): CLINICAL     What was the call regarding: PATIENT IS CALLING TO CHECK THE STATUS OF A EMOTIONAL SUPPORT DOG.   "

## 2024-03-29 ENCOUNTER — LAB (OUTPATIENT)
Dept: LAB | Facility: HOSPITAL | Age: 69
End: 2024-03-29
Payer: MEDICARE

## 2024-03-29 DIAGNOSIS — Z79.899 ENCOUNTER FOR LONG-TERM (CURRENT) USE OF OTHER MEDICATIONS: ICD-10-CM

## 2024-03-29 DIAGNOSIS — M06.9 RHEUMATOID ARTHRITIS, INVOLVING UNSPECIFIED SITE, UNSPECIFIED WHETHER RHEUMATOID FACTOR PRESENT: ICD-10-CM

## 2024-03-29 LAB
ALBUMIN SERPL-MCNC: 4 G/DL (ref 3.5–5.2)
ALBUMIN/GLOB SERPL: 1.6 G/DL
ALP SERPL-CCNC: 45 U/L (ref 39–117)
ALT SERPL W P-5'-P-CCNC: 19 U/L (ref 1–33)
ANION GAP SERPL CALCULATED.3IONS-SCNC: 10 MMOL/L (ref 5–15)
AST SERPL-CCNC: 19 U/L (ref 1–32)
BASOPHILS # BLD AUTO: 0.06 10*3/MM3 (ref 0–0.2)
BASOPHILS NFR BLD AUTO: 0.7 % (ref 0–1.5)
BILIRUB SERPL-MCNC: 0.3 MG/DL (ref 0–1.2)
BUN SERPL-MCNC: 27 MG/DL (ref 8–23)
BUN/CREAT SERPL: 26.5 (ref 7–25)
CALCIUM SPEC-SCNC: 9 MG/DL (ref 8.6–10.5)
CHLORIDE SERPL-SCNC: 105 MMOL/L (ref 98–107)
CO2 SERPL-SCNC: 24 MMOL/L (ref 22–29)
CREAT SERPL-MCNC: 1.02 MG/DL (ref 0.57–1)
CRP SERPL-MCNC: <0.3 MG/DL (ref 0–0.5)
DEPRECATED RDW RBC AUTO: 53.5 FL (ref 37–54)
EGFRCR SERPLBLD CKD-EPI 2021: 59.7 ML/MIN/1.73
EOSINOPHIL # BLD AUTO: 0.1 10*3/MM3 (ref 0–0.4)
EOSINOPHIL NFR BLD AUTO: 1.2 % (ref 0.3–6.2)
ERYTHROCYTE [DISTWIDTH] IN BLOOD BY AUTOMATED COUNT: 16 % (ref 12.3–15.4)
ERYTHROCYTE [SEDIMENTATION RATE] IN BLOOD: <1 MM/HR (ref 0–30)
GLOBULIN UR ELPH-MCNC: 2.5 GM/DL
GLUCOSE SERPL-MCNC: 86 MG/DL (ref 65–99)
HCT VFR BLD AUTO: 36.7 % (ref 34–46.6)
HGB BLD-MCNC: 11.8 G/DL (ref 12–15.9)
IMM GRANULOCYTES # BLD AUTO: 0.02 10*3/MM3 (ref 0–0.05)
IMM GRANULOCYTES NFR BLD AUTO: 0.2 % (ref 0–0.5)
LYMPHOCYTES # BLD AUTO: 1.97 10*3/MM3 (ref 0.7–3.1)
LYMPHOCYTES NFR BLD AUTO: 23.7 % (ref 19.6–45.3)
MCH RBC QN AUTO: 29.6 PG (ref 26.6–33)
MCHC RBC AUTO-ENTMCNC: 32.2 G/DL (ref 31.5–35.7)
MCV RBC AUTO: 92.2 FL (ref 79–97)
MONOCYTES # BLD AUTO: 0.64 10*3/MM3 (ref 0.1–0.9)
MONOCYTES NFR BLD AUTO: 7.7 % (ref 5–12)
NEUTROPHILS NFR BLD AUTO: 5.52 10*3/MM3 (ref 1.7–7)
NEUTROPHILS NFR BLD AUTO: 66.5 % (ref 42.7–76)
PLATELET # BLD AUTO: 304 10*3/MM3 (ref 140–450)
PMV BLD AUTO: 9.4 FL (ref 6–12)
POTASSIUM SERPL-SCNC: 4.4 MMOL/L (ref 3.5–5.2)
PROT SERPL-MCNC: 6.5 G/DL (ref 6–8.5)
RBC # BLD AUTO: 3.98 10*6/MM3 (ref 3.77–5.28)
SODIUM SERPL-SCNC: 139 MMOL/L (ref 136–145)
WBC NRBC COR # BLD AUTO: 8.31 10*3/MM3 (ref 3.4–10.8)

## 2024-03-29 PROCEDURE — 85025 COMPLETE CBC W/AUTO DIFF WBC: CPT

## 2024-03-29 PROCEDURE — 85652 RBC SED RATE AUTOMATED: CPT

## 2024-03-29 PROCEDURE — 86140 C-REACTIVE PROTEIN: CPT

## 2024-03-29 PROCEDURE — 80053 COMPREHEN METABOLIC PANEL: CPT

## 2024-03-29 PROCEDURE — 36415 COLL VENOUS BLD VENIPUNCTURE: CPT

## 2024-04-11 ENCOUNTER — TELEPHONE (OUTPATIENT)
Dept: FAMILY MEDICINE CLINIC | Age: 69
End: 2024-04-11
Payer: MEDICARE

## 2024-04-11 DIAGNOSIS — Z12.2 SCREENING FOR LUNG CANCER: Primary | ICD-10-CM

## 2024-04-11 DIAGNOSIS — Z87.891 PERSONAL HISTORY OF TOBACCO USE, PRESENTING HAZARDS TO HEALTH: ICD-10-CM

## 2024-04-11 NOTE — TELEPHONE ENCOUNTER
----- Message from Rosalinda Irizarry MD sent at 4/12/2023  1:22 PM EDT -----  Please notify pt that it is time for  yearly low dose CT chest to screen for lung cancer.  Please pend order to me.  Thanks, JONATHAN

## 2024-04-17 ENCOUNTER — TELEPHONE (OUTPATIENT)
Dept: FAMILY MEDICINE CLINIC | Age: 69
End: 2024-04-17
Payer: MEDICARE

## 2024-04-17 NOTE — TELEPHONE ENCOUNTER
Pt due for Prolia 5/14/24    Last Dexa -2.5  DEXA Bone Density Axial (05/04/2022 09:14)     Last CMP- Calcium 9.0  Comprehensive Metabolic Panel (03/29/2024 14:09)     Last office Visit   Office Visit with Rosalinda Irizarry MD (10/27/2023)

## 2024-05-03 ENCOUNTER — TELEPHONE (OUTPATIENT)
Dept: FAMILY MEDICINE CLINIC | Age: 69
End: 2024-05-03
Payer: MEDICARE

## 2024-05-03 DIAGNOSIS — Z78.0 POSTMENOPAUSAL: Primary | ICD-10-CM

## 2024-05-10 ENCOUNTER — LAB (OUTPATIENT)
Dept: LAB | Facility: HOSPITAL | Age: 69
End: 2024-05-10
Payer: MEDICARE

## 2024-05-10 ENCOUNTER — HOSPITAL ENCOUNTER (OUTPATIENT)
Dept: CT IMAGING | Facility: HOSPITAL | Age: 69
Discharge: HOME | End: 2024-05-10
Payer: MEDICARE

## 2024-05-10 DIAGNOSIS — M06.9 RHEUMATOID ARTHRITIS, INVOLVING UNSPECIFIED SITE, UNSPECIFIED WHETHER RHEUMATOID FACTOR PRESENT: ICD-10-CM

## 2024-05-10 DIAGNOSIS — Z79.899 ENCOUNTER FOR LONG-TERM (CURRENT) USE OF OTHER MEDICATIONS: ICD-10-CM

## 2024-05-10 DIAGNOSIS — Z87.891 PERSONAL HISTORY OF TOBACCO USE, PRESENTING HAZARDS TO HEALTH: ICD-10-CM

## 2024-05-10 LAB
BASOPHILS # BLD AUTO: 0.07 10*3/MM3 (ref 0–0.2)
BASOPHILS NFR BLD AUTO: 0.7 % (ref 0–1.5)
DEPRECATED RDW RBC AUTO: 53.5 FL (ref 37–54)
EOSINOPHIL # BLD AUTO: 0.04 10*3/MM3 (ref 0–0.4)
EOSINOPHIL NFR BLD AUTO: 0.4 % (ref 0.3–6.2)
ERYTHROCYTE [DISTWIDTH] IN BLOOD BY AUTOMATED COUNT: 15.8 % (ref 12.3–15.4)
ERYTHROCYTE [SEDIMENTATION RATE] IN BLOOD: 3 MM/HR (ref 0–30)
HCT VFR BLD AUTO: 38.5 % (ref 34–46.6)
HGB BLD-MCNC: 12.5 G/DL (ref 12–15.9)
IMM GRANULOCYTES # BLD AUTO: 0.03 10*3/MM3 (ref 0–0.05)
IMM GRANULOCYTES NFR BLD AUTO: 0.3 % (ref 0–0.5)
LYMPHOCYTES # BLD AUTO: 1.14 10*3/MM3 (ref 0.7–3.1)
LYMPHOCYTES NFR BLD AUTO: 11.6 % (ref 19.6–45.3)
MCH RBC QN AUTO: 30 PG (ref 26.6–33)
MCHC RBC AUTO-ENTMCNC: 32.5 G/DL (ref 31.5–35.7)
MCV RBC AUTO: 92.3 FL (ref 79–97)
MONOCYTES # BLD AUTO: 0.77 10*3/MM3 (ref 0.1–0.9)
MONOCYTES NFR BLD AUTO: 7.8 % (ref 5–12)
NEUTROPHILS NFR BLD AUTO: 7.82 10*3/MM3 (ref 1.7–7)
NEUTROPHILS NFR BLD AUTO: 79.2 % (ref 42.7–76)
PLATELET # BLD AUTO: 310 10*3/MM3 (ref 140–450)
PMV BLD AUTO: 9.4 FL (ref 6–12)
RBC # BLD AUTO: 4.17 10*6/MM3 (ref 3.77–5.28)
WBC NRBC COR # BLD AUTO: 9.87 10*3/MM3 (ref 3.4–10.8)

## 2024-05-10 PROCEDURE — 71271 CT THORAX LUNG CANCER SCR C-: CPT

## 2024-05-10 PROCEDURE — 80053 COMPREHEN METABOLIC PANEL: CPT

## 2024-05-10 PROCEDURE — 36415 COLL VENOUS BLD VENIPUNCTURE: CPT

## 2024-05-10 PROCEDURE — 85025 COMPLETE CBC W/AUTO DIFF WBC: CPT

## 2024-05-10 PROCEDURE — 86140 C-REACTIVE PROTEIN: CPT

## 2024-05-10 PROCEDURE — 85652 RBC SED RATE AUTOMATED: CPT

## 2024-05-11 LAB
ALBUMIN SERPL-MCNC: 3.8 G/DL (ref 3.5–5.2)
ALBUMIN/GLOB SERPL: 1.4 G/DL
ALP SERPL-CCNC: 59 U/L (ref 39–117)
ALT SERPL W P-5'-P-CCNC: 18 U/L (ref 1–33)
ANION GAP SERPL CALCULATED.3IONS-SCNC: 13.7 MMOL/L (ref 5–15)
AST SERPL-CCNC: 24 U/L (ref 1–32)
BILIRUB SERPL-MCNC: 0.2 MG/DL (ref 0–1.2)
BUN SERPL-MCNC: 26 MG/DL (ref 8–23)
BUN/CREAT SERPL: 19 (ref 7–25)
CALCIUM SPEC-SCNC: 9 MG/DL (ref 8.6–10.5)
CHLORIDE SERPL-SCNC: 103 MMOL/L (ref 98–107)
CO2 SERPL-SCNC: 21.3 MMOL/L (ref 22–29)
CREAT SERPL-MCNC: 1.37 MG/DL (ref 0.57–1)
CRP SERPL-MCNC: 1.04 MG/DL (ref 0–0.5)
EGFRCR SERPLBLD CKD-EPI 2021: 41.9 ML/MIN/1.73
GLOBULIN UR ELPH-MCNC: 2.7 GM/DL
GLUCOSE SERPL-MCNC: 87 MG/DL (ref 65–99)
POTASSIUM SERPL-SCNC: 3.9 MMOL/L (ref 3.5–5.2)
PROT SERPL-MCNC: 6.5 G/DL (ref 6–8.5)
SODIUM SERPL-SCNC: 138 MMOL/L (ref 136–145)

## 2024-05-31 ENCOUNTER — OFFICE VISIT (OUTPATIENT)
Dept: FAMILY MEDICINE CLINIC | Age: 69
End: 2024-05-31
Payer: MEDICARE

## 2024-05-31 ENCOUNTER — HOSPITAL ENCOUNTER (OUTPATIENT)
Dept: GENERAL RADIOLOGY | Facility: HOSPITAL | Age: 69
Discharge: HOME | End: 2024-05-31
Payer: MEDICARE

## 2024-05-31 VITALS
HEIGHT: 59 IN | OXYGEN SATURATION: 93 % | DIASTOLIC BLOOD PRESSURE: 71 MMHG | BODY MASS INDEX: 23.59 KG/M2 | SYSTOLIC BLOOD PRESSURE: 122 MMHG | HEART RATE: 90 BPM | WEIGHT: 117 LBS

## 2024-05-31 DIAGNOSIS — M79.672 ACUTE FOOT PAIN, LEFT: ICD-10-CM

## 2024-05-31 DIAGNOSIS — M05.741 RHEUMATOID ARTHRITIS INVOLVING BOTH HANDS WITH POSITIVE RHEUMATOID FACTOR: ICD-10-CM

## 2024-05-31 DIAGNOSIS — F33.1 MODERATE EPISODE OF RECURRENT MAJOR DEPRESSIVE DISORDER: ICD-10-CM

## 2024-05-31 DIAGNOSIS — F41.1 GENERALIZED ANXIETY DISORDER: ICD-10-CM

## 2024-05-31 DIAGNOSIS — M05.742 RHEUMATOID ARTHRITIS INVOLVING BOTH HANDS WITH POSITIVE RHEUMATOID FACTOR: ICD-10-CM

## 2024-05-31 DIAGNOSIS — K21.9 GASTROESOPHAGEAL REFLUX DISEASE WITHOUT ESOPHAGITIS: ICD-10-CM

## 2024-05-31 DIAGNOSIS — M81.0 AGE-RELATED OSTEOPOROSIS WITHOUT CURRENT PATHOLOGICAL FRACTURE: ICD-10-CM

## 2024-05-31 DIAGNOSIS — I10 ESSENTIAL HYPERTENSION: Primary | ICD-10-CM

## 2024-05-31 DIAGNOSIS — L23.7 POISON IVY DERMATITIS: ICD-10-CM

## 2024-05-31 PROBLEM — Z00.00 ANNUAL PHYSICAL EXAM: Status: RESOLVED | Noted: 2021-10-21 | Resolved: 2024-05-31

## 2024-05-31 PROCEDURE — 1159F MED LIST DOCD IN RCRD: CPT | Performed by: FAMILY MEDICINE

## 2024-05-31 PROCEDURE — 3074F SYST BP LT 130 MM HG: CPT | Performed by: FAMILY MEDICINE

## 2024-05-31 PROCEDURE — 96372 THER/PROPH/DIAG INJ SC/IM: CPT | Performed by: FAMILY MEDICINE

## 2024-05-31 PROCEDURE — 1160F RVW MEDS BY RX/DR IN RCRD: CPT | Performed by: FAMILY MEDICINE

## 2024-05-31 PROCEDURE — 99214 OFFICE O/P EST MOD 30 MIN: CPT | Performed by: FAMILY MEDICINE

## 2024-05-31 PROCEDURE — 3078F DIAST BP <80 MM HG: CPT | Performed by: FAMILY MEDICINE

## 2024-05-31 PROCEDURE — 73630 X-RAY EXAM OF FOOT: CPT

## 2024-05-31 RX ORDER — CELECOXIB 200 MG/1
CAPSULE ORAL
COMMUNITY
Start: 2024-01-22

## 2024-05-31 RX ORDER — METHOCARBAMOL 750 MG/1
750 TABLET, FILM COATED ORAL 4 TIMES DAILY
COMMUNITY
Start: 2024-05-16

## 2024-05-31 RX ORDER — CALCIUM POLYCARBOPHIL 625 MG/1
TABLET, FILM COATED ORAL
COMMUNITY

## 2024-05-31 RX ORDER — ACETAMINOPHEN 500 MG
1000 TABLET ORAL 4 TIMES DAILY
COMMUNITY
Start: 2024-05-16

## 2024-05-31 RX ORDER — DULOXETIN HYDROCHLORIDE 60 MG/1
60 CAPSULE, DELAYED RELEASE ORAL 2 TIMES DAILY
Qty: 180 CAPSULE | Refills: 1 | Status: SHIPPED | OUTPATIENT
Start: 2024-05-31

## 2024-05-31 RX ORDER — PRUCALOPRIDE 2 MG/1
1 TABLET, FILM COATED ORAL DAILY
COMMUNITY

## 2024-05-31 RX ORDER — LOSARTAN POTASSIUM 100 MG/1
100 TABLET ORAL DAILY
Qty: 90 TABLET | Refills: 1 | Status: SHIPPED | OUTPATIENT
Start: 2024-05-31

## 2024-05-31 RX ORDER — ATENOLOL 25 MG/1
50 TABLET ORAL DAILY
Qty: 180 TABLET | Refills: 1 | Status: SHIPPED | OUTPATIENT
Start: 2024-05-31

## 2024-05-31 RX ORDER — PREDNISONE 5 MG/1
TABLET ORAL
Qty: 36 TABLET | Refills: 0 | Status: SHIPPED | OUTPATIENT
Start: 2024-05-31

## 2024-05-31 NOTE — ASSESSMENT & PLAN NOTE
Stable on duloxetine 60 mg twice daily and buspirone 10 mg 3 times daily.  Refills were needed today.  Continue to monitor.

## 2024-05-31 NOTE — ASSESSMENT & PLAN NOTE
Blood pressure has been running at goal.  Continue amlodipine 2.5 mg daily, atenolol 50 mg daily and losartan 100 mg daily.  Refills were needed today.  Labs were not needed today.  Continue to monitor.

## 2024-05-31 NOTE — PROGRESS NOTES
Chief Complaint  Hypertension (6 month f/u), Poison Ivy, and Foot Swelling (L foot is swelling )    Subjective          Jaqueline Katz presents to [facility] today for routine follow-up.     She just underwent hysterectomy and bladder repair with  Urology on 5/14/2024.  She did have some postoperative anemia that is stable.  She is doing so much better since she was out of surgery.      She started with itching from poison ivy yesterday.  Rash is diffuse, involving arms, legs and upper chest as well as behind the ears.  She has used Benadryl.      She has pain in the L foot for the past month.  Starts on the lateral dorsal foot and wraps around under the foot to the arch.    She is on methotrexate for RA.  She is following with Dr. Bienvenido Cordero in [place].    She has shown persistent     She is on amlodipine, atenolol and losartan for hypertension.  Her BP has been well controlled.  She denies chest pain, palpitations or shortness of breath.      She is on duloxetine and buspirone for anxiety and depression.  She has been seen by [facility] in the past but is no longer seeing her as she has been quite stable on her current regimen.  She denies anxiety or panic attacks, depressed mood or anhedonia.     She is on esomeprazole for GERD.  Status post EGD with Dr. [facility] at [facility] that reportedly showed gastritis.  She denies indigestion or reflux.     She is on Prolia for osteoporosis.  She is up-to-date on DEXA, last done 5/2022 and this showed osteoporosis.         Current Outpatient Medications:     acetaminophen (TYLENOL) 500 MG tablet, Take 2 tablets by mouth 4 (Four) Times a Day., Disp: , Rfl:     amLODIPine (NORVASC) 2.5 MG tablet, TAKE ONE TABLET BY MOUTH EVERY DAY FOR BLOOD PRESSURE, Disp: 90 tablet, Rfl: 1    atenolol (TENORMIN) 25 MG tablet, Take 2 tablets by mouth Daily., Disp: 180 tablet, Rfl: 1    busPIRone (BUSPAR) 10 MG tablet, TAKE ONE TABLET  BY MOUTH THREE TIMES DAILY, Disp: 90 tablet, Rfl: 5    celecoxib (CeleBREX) 200 MG capsule, TAKE ONE CAPSULE BY MOUTH TWICE DAILY WITH FOOD AS NEEDED, Disp: , Rfl:     cetirizine (zyrTEC) 10 MG tablet, Take 1 tablet every day by oral route., Disp: , Rfl:     Cholecalciferol 100 MCG (4000 UT) capsule, Take 1 capsule every day by oral route., Disp: , Rfl:     Cobalamin Combinations (Vitamin B12-Folic Acid) 500-400 MCG tablet, Take 1 tablet every day by oral route., Disp: , Rfl:     denosumab (Prolia) 60 MG/ML solution prefilled syringe syringe, 1 mL Every 6 (Six) Months., Disp: , Rfl:     DULoxetine (CYMBALTA) 60 MG capsule, Take 1 capsule by mouth 2 (Two) Times a Day., Disp: 180 capsule, Rfl: 1    esomeprazole (nexIUM) 40 MG capsule, TAKE ONE CAPSULE BY MOUTH EVERY DAY, Disp: 90 capsule, Rfl: 1    Fiber-Lax 625 MG tablet, TAKE ONE TABLET BY MOUTH TWICE DAILY with 8 ounces of water. MAY take BOTH AT ONCE AND increase UP TO 4 tabs DAILY AS tolerated., Disp: , Rfl:     folic acid (FOLVITE) 1 MG tablet, Take 1 tablet by mouth Daily., Disp: , Rfl:     Iron, Ferrous Sulfate, 325 (65 Fe) MG tablet, Take 325 mg by mouth Daily., Disp: 30 tablet, Rfl: 5    losartan (COZAAR) 100 MG tablet, Take 1 tablet by mouth Daily., Disp: 90 tablet, Rfl: 1    methocarbamol (ROBAXIN) 750 MG tablet, Take 1 tablet by mouth 4 (Four) Times a Day., Disp: , Rfl:     methotrexate 2.5 MG tablet, Take 8 tablets by mouth 1 (One) Time Per Week., Disp: , Rfl:     montelukast (SINGULAIR) 10 MG tablet, TAKE ONE TABLET BY MOUTH EVERY EVENING, Disp: 90 tablet, Rfl: 3    olopatadine (PATANOL) 0.1 % ophthalmic solution, Patanol 0.1 % eye drops  INSTILL 1 DROP INTO AFFECTED EYE(S) BY OPHTHALMIC ROUTE 2 TIMES PER DAY AT AN INTERVAL OF 6 TO 8 HOURS, Disp: , Rfl:     ondansetron ODT (ZOFRAN-ODT) 4 MG disintegrating tablet, Place 1 tablet on the tongue Every 8 (Eight) Hours As Needed for Nausea or Vomiting., Disp: 20 tablet, Rfl: 0    Prucalopride Succinate  "(Motegrity) 2 MG tablet, Take 1 tablet by mouth Daily., Disp: , Rfl:     RSVPreF3 Vac Recomb Adjuvanted (Arexvy) 120 MCG/0.5ML reconstituted suspension injection, Inject  into the appropriate muscle as directed by prescriber., Disp: 1 each, Rfl: 0    predniSONE (DELTASONE) 5 MG tablet, Take 8 tabs PO the first day, then 7 the following day, then 6 the day after, and so on decreasing by 1 tab per day until medication is gone, Disp: 36 tablet, Rfl: 0    Current Facility-Administered Medications:     denosumab (PROLIA) syringe 60 mg, 60 mg, Subcutaneous, Q6 Months, Rosalinda Irizarry MD, 60 mg at 11/14/23 1418  Medications Discontinued During This Encounter   Medication Reason    losartan (COZAAR) 100 MG tablet Reorder    atenolol (TENORMIN) 25 MG tablet Reorder    DULoxetine (CYMBALTA) 60 MG capsule Reorder         Allergies:  Penicillins and Nsaids      Objective   Vital Signs:   Vitals:    05/31/24 1415   BP: 122/71   BP Location: Right arm   Patient Position: Sitting   Cuff Size: Adult   Pulse: 90   SpO2: 93%   Weight: 53.1 kg (117 lb)   Height: 149.9 cm (59.02\")     Body mass index is 23.62 kg/m².  BMI is within normal parameters. No other follow-up for BMI required.        Physical Exam  Vitals reviewed.   Constitutional:       General: She is not in acute distress.     Appearance: Normal appearance. She is well-developed.   HENT:      Head: Normocephalic and atraumatic.      Right Ear: External ear normal.      Left Ear: External ear normal.   Eyes:      Extraocular Movements: Extraocular movements intact.      Conjunctiva/sclera: Conjunctivae normal.      Pupils: Pupils are equal, round, and reactive to light.   Cardiovascular:      Rate and Rhythm: Normal rate and regular rhythm.      Heart sounds: No murmur heard.  Pulmonary:      Effort: Pulmonary effort is normal.      Breath sounds: Normal breath sounds. No wheezing, rhonchi or rales.   Abdominal:      General: Bowel sounds are normal. There is no " distension.      Palpations: Abdomen is soft.      Tenderness: There is no abdominal tenderness.   Musculoskeletal:         General: Tenderness (L lateral dorsal foot, no swelling or skin changes/erythema) present. Normal range of motion.   Skin:     Findings: Rash (diffuse erythematous papulovesicular rash with excoriation and bruising) present.   Neurological:      Mental Status: She is alert.   Psychiatric:         Mood and Affect: Affect normal.           Lab Results   Component Value Date    GLUCOSE 165 (H) 05/14/2024    BUN 26 (H) 05/10/2024    CREATININE 1.37 (H) 05/10/2024    EGFRIFNONA 59 (L) 02/07/2022    BCR 19.0 05/10/2024    K 4.5 05/14/2024    CO2 21.3 (L) 05/10/2024    CALCIUM 9.0 05/10/2024    ALBUMIN 3.8 05/10/2024    LABIL2 1.5 03/24/2021    AST 24 05/10/2024    ALT 18 05/10/2024       Lab Results   Component Value Date    CHOL 163 10/27/2023    CHLPL 215 (H) 03/24/2021    TRIG 79 10/27/2023    HDL 50 10/27/2023    LDL 98 10/27/2023       Lab Results   Component Value Date    WBC 7.12 05/16/2024    HGB 9.0 (L) 05/16/2024    HCT 28.2 (L) 05/16/2024    MCV 94 05/16/2024     05/16/2024            Assessment and Plan    Assessment & Plan  Essential hypertension  Blood pressure has been running at goal.  Continue amlodipine 2.5 mg daily, atenolol 50 mg daily and losartan 100 mg daily.  Refills were needed today.  Labs were not needed today.  Continue to monitor.   Moderate episode of recurrent major depressive disorder  Stable on duloxetine 60 mg twice daily and buspirone 10 mg 3 times daily.  Refills were needed today.  Continue to monitor.   Generalized anxiety disorder    As per depression plan.  Gastroesophageal reflux disease without esophagitis  Stable on esomeprazole 40 mg daily.  Refills were not needed today.  Continue to monitor.  Wean PPI as able.   Age-related osteoporosis without current pathological fracture  On Prolia.  DEXA is up-to-date, last done 5/2022 showing  osteoporosis.  Rheumatoid arthritis involving both hands with positive rheumatoid factor  Following with Dr. [facility] in [place]. On methotrexate.     Poison ivy dermatitis  Poison ivy dermatitis.  Due to diffuse involvement, including of the face, treating with a steroid pack.  She requests a steroid injection today, but we discussed the risk of rebound rash with IM steroids.    Acute foot pain, left  Acute left foot pain x 1 month.  Checking XR.    Orders Placed This Encounter   Procedures    XR Foot 3+ View Left     New Medications Ordered This Visit   Medications    atenolol (TENORMIN) 25 MG tablet     Sig: Take 2 tablets by mouth Daily.     Dispense:  180 tablet     Refill:  1     This prescription was filled on 1/3/2024. Any refills authorized will be placed on file.    DULoxetine (CYMBALTA) 60 MG capsule     Sig: Take 1 capsule by mouth 2 (Two) Times a Day.     Dispense:  180 capsule     Refill:  1     This prescription was filled on 1/3/2024. Any refills authorized will be placed on file.    losartan (COZAAR) 100 MG tablet     Sig: Take 1 tablet by mouth Daily.     Dispense:  90 tablet     Refill:  1     This prescription was filled on 12/14/2023. Any refills authorized will be placed on file.    predniSONE (DELTASONE) 5 MG tablet     Sig: Take 8 tabs PO the first day, then 7 the following day, then 6 the day after, and so on decreasing by 1 tab per day until medication is gone     Dispense:  36 tablet     Refill:  0           Follow Up   Return in about 6 months (around 11/30/2024) for Medicare Wellness.  Patient was given instructions and counseling regarding her condition or for health maintenance advice. Please see specific information pulled into the AVS if appropriate.           05/31/2024

## 2024-06-02 NOTE — TELEPHONE ENCOUNTER
Problem: Adult Inpatient Plan of Care  Goal: Plan of Care Review  Outcome: Ongoing, Progressing  Flowsheets (Taken 6/2/2024 9646)  Progress: improving  Plan of Care Reviewed With: patient  Goal: Patient-Specific Goal (Individualized)  Outcome: Ongoing, Progressing  Goal: Absence of Hospital-Acquired Illness or Injury  Outcome: Ongoing, Progressing  Intervention: Identify and Manage Fall Risk  Recent Flowsheet Documentation  Taken 6/2/2024 0830 by Radha Santo RN  Safety Promotion/Fall Prevention: safety round/check completed  Goal: Optimal Comfort and Wellbeing  Outcome: Ongoing, Progressing  Goal: Readiness for Transition of Care  Outcome: Ongoing, Progressing   Goal Outcome Evaluation:  Plan of Care Reviewed With: patient        Progress: improving                                   From: Jaqueline Katz  To: Rosalinda Juan C  Sent: 2/26/2024 5:53 PM EST  Subject: Need letter    Would you please write me a letter for an emotional support animal. I am getting a place in [place] to stay a few times a year and in order to take my dog Poonam I have to have a letter from my doctor. I do have anxiety that you know of. I can’t imagine not being able to take her with me when I go for exteded periods of time. I would really appreciate it. Thank you.

## 2024-06-03 NOTE — TELEPHONE ENCOUNTER
[facility]  [address] [place] [id number]  [phone]       Name: Myah Solomon   MRN: [id number]  Date: 6/3/2024     REASON FOR VISIT: Fifth Post-Surgical Visit   Surgery: Right knee arthroscopy medial meniscus repair, microfracture on December 1, 2023.    INTERVAL HISTORY:  Myah Solomon is a 16 year old female who returns after the aforementioned procedure.  The post-operative course has been unremarkable with pain well controlled and overall progress noted.     Patient previously sustained an re-injury to her right knee and had been going to physical therapy. She was doing well until 2 months ago. 5-6/10 pain. She has difficulty with stairs and squatting.       PE:   There were no vitals filed for this visit.    Estimated body mass index is 19.58 kg/m² as calculated from the following:    Height as of 1/11/24: 5' 7\" (1.702 m).    Weight as of 5/7/24: 125 lb.    Physical Exam  Constitutional:       Appearance: Normal appearance.   HENT:      Head: Normocephalic and atraumatic.   Eyes:      Extraocular Movements: Extraocular movements intact.   Neck:      Musculoskeletal: Normal range of motion and neck supple.   Cardiovascular:      Pulses: Normal pulses.   Pulmonary:      Effort: Pulmonary effort is normal. No respiratory distress.   Abdominal:      General: There is no distension.   Skin:     General: Skin is warm.      Capillary Refill: Capillary refill takes less than 2 seconds.      Findings: No bruising.   Neurological:      General: No focal deficit present.      Mental Status: She is alert.   Psychiatric:         Mood and Affect: Mood normal.     Examination of the right knee knee demonstrates:     Physical examination the patient is alert and oriented x3, well-developed, well-nourished, no acute distress.     Full ROM. Subtle pain with end range of motion / including deep flexion.  No significant mechanical findings or Suzanne's test.    Incisional  Records received, please review and advise.   sites are clean dry intact without signs of active pathology.  Calf is soft and nontender to palpation.    The contralateral knee is without limitation in range of motion or strength, no positive provocative maneuvers.       IMPRESSION: Myah Solomon is a 16 year old female who presents 6 months s/p  Right knee arthroscopy medial meniscus repair, microfracture on December 1, 2023.    Patient presents with persistent medial joint line discomfort in the setting of prior medial meniscus repair.  We discussed the role of repeat MRI scan at this time.    PLAN:   We had a lengthy discussion with the patient regarding the patient's findings consistent with the expected postoperative course. We recommend continuation of physical therapy with rehabilitation efforts focused on strengthening, range of motion, functional ability, and return to baseline activity. The patient can continue to progress per protocol.    In light of the chronicity of symptoms, loss of normal function, and  failure to progress conservatively we recommend an MRI to evaluate the integrity of the patient's findings. The patient will follow up after imaging.   Differential diagnosis includes but not limited to: cartilage injury/loose body, meniscus tear/injury, ACL tear, bone marrow edema, and osteoarthritis.     External records were also reviewed for pertinent historical findings contributing to the patients undiagnosed new problem with uncertain prognosis.     We provided education, and discussed at great length the use of OrthoBiologics, specifically, Platelet Rich Plasma (PRP). We discussed the growing evidence for the efficacy of PRP injections with regard to the patient's specific findings, as well as the promotion of healing for muscle, tendon, and joint injuries.     We discussed the scientific rationale for this procedure which is that the plasma contains platelets which release growth factors that induce a healing response wherever they are  applied. We also discussed the benefits, risks, and limitations. The patient understands that there is a slightly higher level of complexity to this procedure compared to other injections such as cortisone, or viscosupplementation.     We also discussed the benefits of PRP in comparison to surgery, specifically including, but not limited to: less invasive than an open surgical procedure for the same condition, possible shorter recovery time, significantly more cost effective.      All questions were answered appropriately and the patient was in agreement with the treatment plan.      FOLLOW-UP:  Return to clinic in six weeks.        Radha Bonds MD  Knee, Shoulder, & Elbow Surgery / Sports Medicine Specialist  Orthopaedic Surgery  [address] [place] [address] [id number] [place] [email or web address].org  [email or web address]  t: [phone]  o: [phone]  f: [phone]

## 2024-06-04 DIAGNOSIS — S92.355A CLOSED NONDISPLACED FRACTURE OF FIFTH METATARSAL BONE OF LEFT FOOT, INITIAL ENCOUNTER: Primary | ICD-10-CM

## 2024-06-04 RX ORDER — CELECOXIB 200 MG/1
CAPSULE ORAL
Qty: 90 CAPSULE | Refills: 1 | Status: SHIPPED | OUTPATIENT
Start: 2024-06-04

## 2024-06-04 RX ORDER — FERROUS SULFATE 325(65) MG
1 TABLET ORAL DAILY
Qty: 30 TABLET | Refills: 5 | Status: SHIPPED | OUTPATIENT
Start: 2024-06-04

## 2024-06-06 ENCOUNTER — HOSPITAL ENCOUNTER (OUTPATIENT)
Dept: BONE DENSITY | Facility: HOSPITAL | Age: 69
Discharge: HOME | End: 2024-06-06
Payer: MEDICARE

## 2024-06-06 DIAGNOSIS — Z78.0 POSTMENOPAUSAL: ICD-10-CM

## 2024-06-06 PROCEDURE — 77080 DXA BONE DENSITY AXIAL: CPT

## 2024-06-12 RX ORDER — METHOTREXATE 2.5 MG/1
TABLET ORAL
Qty: 96 TABLET | Refills: 0 | Status: SHIPPED | OUTPATIENT
Start: 2024-06-12

## 2024-06-17 ENCOUNTER — OFFICE VISIT (OUTPATIENT)
Dept: FAMILY MEDICINE CLINIC | Age: 69
End: 2024-06-17
Payer: MEDICARE

## 2024-06-17 ENCOUNTER — OFFICE VISIT (OUTPATIENT)
Dept: PODIATRY | Facility: CLINIC | Age: 69
End: 2024-06-17
Payer: MEDICARE

## 2024-06-17 VITALS
TEMPERATURE: 97.3 F | BODY MASS INDEX: 23.99 KG/M2 | OXYGEN SATURATION: 98 % | SYSTOLIC BLOOD PRESSURE: 128 MMHG | WEIGHT: 119 LBS | HEART RATE: 119 BPM | HEIGHT: 59 IN | DIASTOLIC BLOOD PRESSURE: 72 MMHG

## 2024-06-17 VITALS
OXYGEN SATURATION: 94 % | DIASTOLIC BLOOD PRESSURE: 78 MMHG | SYSTOLIC BLOOD PRESSURE: 138 MMHG | BODY MASS INDEX: 23.42 KG/M2 | HEART RATE: 117 BPM | WEIGHT: 116 LBS | TEMPERATURE: 98 F

## 2024-06-17 DIAGNOSIS — U07.1 COVID: Primary | ICD-10-CM

## 2024-06-17 DIAGNOSIS — M79.672 LEFT FOOT PAIN: ICD-10-CM

## 2024-06-17 DIAGNOSIS — J01.00 ACUTE NON-RECURRENT MAXILLARY SINUSITIS: ICD-10-CM

## 2024-06-17 DIAGNOSIS — S92.355A CLOSED NONDISPLACED FRACTURE OF FIFTH METATARSAL BONE OF LEFT FOOT, INITIAL ENCOUNTER: Primary | ICD-10-CM

## 2024-06-17 LAB
EXPIRATION DATE: ABNORMAL
FLUAV AG UPPER RESP QL IA.RAPID: NOT DETECTED
FLUBV AG UPPER RESP QL IA.RAPID: NOT DETECTED
INTERNAL CONTROL: ABNORMAL
Lab: ABNORMAL
SARS-COV-2 AG UPPER RESP QL IA.RAPID: DETECTED

## 2024-06-17 PROCEDURE — 87428 SARSCOV & INF VIR A&B AG IA: CPT | Performed by: NURSE PRACTITIONER

## 2024-06-17 PROCEDURE — 3078F DIAST BP <80 MM HG: CPT | Performed by: PODIATRIST

## 2024-06-17 PROCEDURE — 1160F RVW MEDS BY RX/DR IN RCRD: CPT | Performed by: PODIATRIST

## 2024-06-17 PROCEDURE — 1159F MED LIST DOCD IN RCRD: CPT | Performed by: PODIATRIST

## 2024-06-17 PROCEDURE — 1160F RVW MEDS BY RX/DR IN RCRD: CPT | Performed by: NURSE PRACTITIONER

## 2024-06-17 PROCEDURE — 99213 OFFICE O/P EST LOW 20 MIN: CPT | Performed by: NURSE PRACTITIONER

## 2024-06-17 PROCEDURE — 3078F DIAST BP <80 MM HG: CPT | Performed by: NURSE PRACTITIONER

## 2024-06-17 PROCEDURE — 1159F MED LIST DOCD IN RCRD: CPT | Performed by: NURSE PRACTITIONER

## 2024-06-17 PROCEDURE — 99203 OFFICE O/P NEW LOW 30 MIN: CPT | Performed by: PODIATRIST

## 2024-06-17 PROCEDURE — 3075F SYST BP GE 130 - 139MM HG: CPT | Performed by: PODIATRIST

## 2024-06-17 PROCEDURE — 3074F SYST BP LT 130 MM HG: CPT | Performed by: NURSE PRACTITIONER

## 2024-06-17 PROCEDURE — 1125F AMNT PAIN NOTED PAIN PRSNT: CPT | Performed by: NURSE PRACTITIONER

## 2024-06-17 RX ORDER — CEFDINIR 300 MG/1
300 CAPSULE ORAL 2 TIMES DAILY
Qty: 14 CAPSULE | Refills: 0 | Status: SHIPPED | OUTPATIENT
Start: 2024-06-17 | End: 2024-06-24

## 2024-06-17 RX ORDER — ESTRADIOL 0.1 MG/G
1 CREAM VAGINAL 3 TIMES WEEKLY
COMMUNITY
Start: 2024-06-14

## 2024-06-17 RX ORDER — ONDANSETRON 4 MG/1
4 TABLET, ORALLY DISINTEGRATING ORAL EVERY 8 HOURS PRN
Qty: 20 TABLET | Refills: 0 | Status: SHIPPED | OUTPATIENT
Start: 2024-06-17

## 2024-06-17 RX ORDER — GUAIFENESIN 600 MG/1
600 TABLET, EXTENDED RELEASE ORAL 2 TIMES DAILY
Qty: 20 TABLET | Refills: 0 | Status: SHIPPED | OUTPATIENT
Start: 2024-06-17

## 2024-06-17 NOTE — PROGRESS NOTES
Chief Complaint  Sinusitis (Congestion, headache, facial pain, body aches onset over the weekend. )    Subjective        Jaqueline Katz presents to [facility] today for sinus pressure, headache, pain around the eyes, pressure behind the eyes, low-grade fever, body aches and chills, started 3 days ago.  Has not been around anyone that is contagious that she knows of, does have a history of allergies and recurrent sinus infections, last problem she had with her sinuses was in 2022.      Current Outpatient Medications:     amLODIPine (NORVASC) 2.5 MG tablet, TAKE ONE TABLET BY MOUTH EVERY DAY FOR BLOOD PRESSURE, Disp: 90 tablet, Rfl: 1    atenolol (TENORMIN) 25 MG tablet, Take 2 tablets by mouth Daily., Disp: 180 tablet, Rfl: 1    busPIRone (BUSPAR) 10 MG tablet, TAKE ONE TABLET BY MOUTH THREE TIMES DAILY, Disp: 90 tablet, Rfl: 5    celecoxib (CeleBREX) 200 MG capsule, TAKE ONE CAPSULE BY MOUTH TWICE DAILY WITH FOOD AS NEEDED, Disp: 90 capsule, Rfl: 1    cetirizine (zyrTEC) 10 MG tablet, Take 1 tablet every day by oral route., Disp: , Rfl:     Cholecalciferol 100 MCG (4000 UT) capsule, Take 1 capsule every day by oral route., Disp: , Rfl:     denosumab (Prolia) 60 MG/ML solution prefilled syringe syringe, 1 mL Every 6 (Six) Months., Disp: , Rfl:     DULoxetine (CYMBALTA) 60 MG capsule, Take 1 capsule by mouth 2 (Two) Times a Day., Disp: 180 capsule, Rfl: 1    esomeprazole (nexIUM) 40 MG capsule, TAKE ONE CAPSULE BY MOUTH EVERY DAY, Disp: 90 capsule, Rfl: 1    estradiol (ESTRACE) 0.1 MG/GM vaginal cream, Insert 1 g into the vagina 3 (Three) Times a Week., Disp: , Rfl:     FeroSul 325 (65 Fe) MG tablet, TAKE ONE TABLET BY MOUTH EVERY DAY WITH FOOD, Disp: 30 tablet, Rfl: 5    Fiber-Lax 625 MG tablet, TAKE ONE TABLET BY MOUTH TWICE DAILY with 8 ounces of water. MAY take BOTH AT ONCE AND increase UP TO 4 tabs DAILY AS tolerated., Disp: , Rfl:     folic acid (FOLVITE) 1 MG tablet, Take 1  tablet by mouth Daily., Disp: , Rfl:     losartan (COZAAR) 100 MG tablet, Take 1 tablet by mouth Daily., Disp: 90 tablet, Rfl: 1    methocarbamol (ROBAXIN) 750 MG tablet, Take 1 tablet by mouth 4 (Four) Times a Day., Disp: , Rfl:     methotrexate 2.5 MG tablet, TAKE EIGHT TABLETS BY MOUTH ONCE WEEKLY AS DIRECTED, Disp: 96 tablet, Rfl: 0    montelukast (SINGULAIR) 10 MG tablet, TAKE ONE TABLET BY MOUTH EVERY EVENING, Disp: 90 tablet, Rfl: 3    olopatadine (PATANOL) 0.1 % ophthalmic solution, Patanol 0.1 % eye drops  INSTILL 1 DROP INTO AFFECTED EYE(S) BY OPHTHALMIC ROUTE 2 TIMES PER DAY AT AN INTERVAL OF 6 TO 8 HOURS, Disp: , Rfl:     ondansetron ODT (ZOFRAN-ODT) 4 MG disintegrating tablet, Place 1 tablet on the tongue Every 8 (Eight) Hours As Needed for Nausea or Vomiting., Disp: 20 tablet, Rfl: 0    Prucalopride Succinate (Motegrity) 2 MG tablet, Take 1 tablet by mouth Daily., Disp: , Rfl:     cefdinir (OMNICEF) 300 MG capsule, Take 1 capsule by mouth 2 (Two) Times a Day for 7 days., Disp: 14 capsule, Rfl: 0    guaiFENesin (Mucinex) 600 MG 12 hr tablet, Take 1 tablet by mouth 2 (Two) Times a Day., Disp: 20 tablet, Rfl: 0    Current Facility-Administered Medications:     denosumab (PROLIA) syringe 60 mg, 60 mg, Subcutaneous, Q6 Months, Rosalinda Irizarry MD, 60 mg at 05/31/24 1447  Medications Discontinued During This Encounter   Medication Reason    acetaminophen (TYLENOL) 500 MG tablet *Therapy completed    Cobalamin Combinations (Vitamin B12-Folic Acid) 500-400 MCG tablet *Therapy completed    predniSONE (DELTASONE) 5 MG tablet *Therapy completed    RSVPreF3 Vac Recomb Adjuvanted (Arexvy) 120 MCG/0.5ML reconstituted suspension injection *Therapy completed    ondansetron ODT (ZOFRAN-ODT) 4 MG disintegrating tablet Reorder         Allergies:  Penicillins and Nsaids      Objective   Vital Signs:   Vitals:    06/17/24 1552   BP: 128/72   BP Location: Right arm   Patient Position: Sitting   Pulse: 119   Temp:  "97.3 °F (36.3 °C)   TempSrc: Oral   SpO2: 98%  Comment: room air   Weight: 54 kg (119 lb)   Height: 149.9 cm (59.02\")     Body mass index is 24.02 kg/m².  BMI is within normal parameters. No other follow-up for BMI required.        Physical Exam  Constitutional:       Appearance: She is ill-appearing.   HENT:      Right Ear: Tympanic membrane normal.      Left Ear: Tympanic membrane normal.      Nose: Congestion present.      Comments: Bilateral maxillary sinus tenderness, frontal sinus tenderness bilateral     Mouth/Throat:      Pharynx: No oropharyngeal exudate or posterior oropharyngeal erythema.   Neck:      Vascular: No carotid bruit.   Cardiovascular:      Rate and Rhythm: Normal rate and regular rhythm.      Heart sounds: Normal heart sounds.   Pulmonary:      Effort: Pulmonary effort is normal.      Breath sounds: Normal breath sounds.   Musculoskeletal:         General: Normal range of motion.   Skin:     General: Skin is warm and dry.   Neurological:      General: No focal deficit present.      Mental Status: She is alert.   Psychiatric:         Mood and Affect: Mood normal.         Behavior: Behavior normal.             Lab Results   Component Value Date    GLUCOSE 165 (H) 05/14/2024    BUN 26 (H) 05/10/2024    CREATININE 1.37 (H) 05/10/2024    EGFRIFNONA 59 (L) 02/07/2022    BCR 19.0 05/10/2024    K 4.5 05/14/2024    CO2 21.3 (L) 05/10/2024    CALCIUM 9.0 05/10/2024    ALBUMIN 3.8 05/10/2024    LABIL2 1.5 03/24/2021    AST 24 05/10/2024    ALT 18 05/10/2024       Lab Results   Component Value Date    CHOL 163 10/27/2023    CHLPL 215 (H) 03/24/2021    TRIG 79 10/27/2023    HDL 50 10/27/2023    LDL 98 10/27/2023       Lab Results   Component Value Date    WBC 7.12 05/16/2024    HGB 9.0 (L) 05/16/2024    HCT 28.2 (L) 05/16/2024    MCV 94 05/16/2024     05/16/2024     Lab Results (last 24 hours)       Procedure Component Value Units Date/Time    POCT SARS-CoV-2 Antigen JW + Flu [[id number]]  " (Abnormal) Collected: 06/17/24 1617    Specimen: Swab Updated: 06/17/24 1618     SARS Antigen Detected     Influenza A Antigen JW Not Detected     Influenza B Antigen JW Not Detected     Internal Control Passed     Lot Number #[id number]     Expiration Date 11/2/24               Procedures         Diagnoses and all orders for this visit:    1. COVID (Primary)  -     POCT SARS-CoV-2 Antigen JW + Flu  -     guaiFENesin (Mucinex) 600 MG 12 hr tablet; Take 1 tablet by mouth 2 (Two) Times a Day.  Dispense: 20 tablet; Refill: 0  -     ondansetron ODT (ZOFRAN-ODT) 4 MG disintegrating tablet; Place 1 tablet on the tongue Every 8 (Eight) Hours As Needed for Nausea or Vomiting.  Dispense: 20 tablet; Refill: 0    2. Acute non-recurrent maxillary sinusitis  -     cefdinir (OMNICEF) 300 MG capsule; Take 1 capsule by mouth 2 (Two) Times a Day for 7 days.  Dispense: 14 capsule; Refill: 0  -     guaiFENesin (Mucinex) 600 MG 12 hr tablet; Take 1 tablet by mouth 2 (Two) Times a Day.  Dispense: 20 tablet; Refill: 0            Follow Up  Return if symptoms worsen or fail to improve, for If not improving by Friday or if symptoms get worse please return to office.  Patient was given instructions and counseling regarding her condition or for health maintenance advice. Please see specific information pulled into the AVS if appropriate.           Henrique Simmons, APRN  06/17/2024    Please note that portions of this document were completed using a voice recognition program.

## 2024-06-19 DIAGNOSIS — I10 ESSENTIAL HYPERTENSION: ICD-10-CM

## 2024-06-19 RX ORDER — AMLODIPINE BESYLATE 2.5 MG/1
TABLET ORAL
Qty: 90 TABLET | Refills: 1 | Status: SHIPPED | OUTPATIENT
Start: 2024-06-19

## 2024-06-20 ENCOUNTER — LAB (OUTPATIENT)
Dept: LAB | Facility: HOSPITAL | Age: 69
End: 2024-06-20
Payer: MEDICARE

## 2024-06-20 ENCOUNTER — PATIENT ROUNDING (BHMG ONLY) (OUTPATIENT)
Dept: PODIATRY | Facility: CLINIC | Age: 69
End: 2024-06-20
Payer: MEDICARE

## 2024-06-20 DIAGNOSIS — M06.9 RHEUMATOID ARTHRITIS, INVOLVING UNSPECIFIED SITE, UNSPECIFIED WHETHER RHEUMATOID FACTOR PRESENT: ICD-10-CM

## 2024-06-20 DIAGNOSIS — Z79.899 ENCOUNTER FOR LONG-TERM (CURRENT) USE OF OTHER MEDICATIONS: ICD-10-CM

## 2024-06-20 LAB
ALBUMIN SERPL-MCNC: 4 G/DL (ref 3.5–5.2)
ALBUMIN/GLOB SERPL: 1.7 G/DL
ALP SERPL-CCNC: 70 U/L (ref 39–117)
ALT SERPL W P-5'-P-CCNC: 49 U/L (ref 1–33)
ANION GAP SERPL CALCULATED.3IONS-SCNC: 9.7 MMOL/L (ref 5–15)
AST SERPL-CCNC: 50 U/L (ref 1–32)
BASOPHILS # BLD AUTO: 0.05 10*3/MM3 (ref 0–0.2)
BASOPHILS NFR BLD AUTO: 0.6 % (ref 0–1.5)
BILIRUB SERPL-MCNC: 0.2 MG/DL (ref 0–1.2)
BUN SERPL-MCNC: 17 MG/DL (ref 8–23)
BUN/CREAT SERPL: 19.1 (ref 7–25)
CALCIUM SPEC-SCNC: 8.7 MG/DL (ref 8.6–10.5)
CHLORIDE SERPL-SCNC: 108 MMOL/L (ref 98–107)
CO2 SERPL-SCNC: 22.3 MMOL/L (ref 22–29)
CREAT SERPL-MCNC: 0.89 MG/DL (ref 0.57–1)
CRP SERPL-MCNC: 0.72 MG/DL (ref 0–0.5)
DEPRECATED RDW RBC AUTO: 45.1 FL (ref 37–54)
EGFRCR SERPLBLD CKD-EPI 2021: 70.3 ML/MIN/1.73
EOSINOPHIL # BLD AUTO: 0.14 10*3/MM3 (ref 0–0.4)
EOSINOPHIL NFR BLD AUTO: 1.7 % (ref 0.3–6.2)
ERYTHROCYTE [DISTWIDTH] IN BLOOD BY AUTOMATED COUNT: 13.8 % (ref 12.3–15.4)
ERYTHROCYTE [SEDIMENTATION RATE] IN BLOOD: 4 MM/HR (ref 0–30)
GLOBULIN UR ELPH-MCNC: 2.4 GM/DL
GLUCOSE SERPL-MCNC: 78 MG/DL (ref 65–99)
HCT VFR BLD AUTO: 35.2 % (ref 34–46.6)
HGB BLD-MCNC: 11.6 G/DL (ref 12–15.9)
IMM GRANULOCYTES # BLD AUTO: 0.04 10*3/MM3 (ref 0–0.05)
IMM GRANULOCYTES NFR BLD AUTO: 0.5 % (ref 0–0.5)
LYMPHOCYTES # BLD AUTO: 1.89 10*3/MM3 (ref 0.7–3.1)
LYMPHOCYTES NFR BLD AUTO: 22.9 % (ref 19.6–45.3)
MCH RBC QN AUTO: 30.2 PG (ref 26.6–33)
MCHC RBC AUTO-ENTMCNC: 33 G/DL (ref 31.5–35.7)
MCV RBC AUTO: 91.7 FL (ref 79–97)
MONOCYTES # BLD AUTO: 0.94 10*3/MM3 (ref 0.1–0.9)
MONOCYTES NFR BLD AUTO: 11.4 % (ref 5–12)
NEUTROPHILS NFR BLD AUTO: 5.19 10*3/MM3 (ref 1.7–7)
NEUTROPHILS NFR BLD AUTO: 62.9 % (ref 42.7–76)
NRBC BLD AUTO-RTO: 0 /100 WBC (ref 0–0.2)
PLATELET # BLD AUTO: 279 10*3/MM3 (ref 140–450)
PMV BLD AUTO: 9.6 FL (ref 6–12)
POTASSIUM SERPL-SCNC: 4.6 MMOL/L (ref 3.5–5.2)
PROT SERPL-MCNC: 6.4 G/DL (ref 6–8.5)
RBC # BLD AUTO: 3.84 10*6/MM3 (ref 3.77–5.28)
SODIUM SERPL-SCNC: 140 MMOL/L (ref 136–145)
WBC NRBC COR # BLD AUTO: 8.25 10*3/MM3 (ref 3.4–10.8)

## 2024-06-20 PROCEDURE — 36415 COLL VENOUS BLD VENIPUNCTURE: CPT

## 2024-06-20 PROCEDURE — 85025 COMPLETE CBC W/AUTO DIFF WBC: CPT

## 2024-06-20 PROCEDURE — 86140 C-REACTIVE PROTEIN: CPT

## 2024-06-20 PROCEDURE — 80053 COMPREHEN METABOLIC PANEL: CPT

## 2024-06-20 PROCEDURE — 85652 RBC SED RATE AUTOMATED: CPT

## 2024-06-20 NOTE — PROGRESS NOTES
A [employer] message has been sent to the patient for PATIENT ROUNDING with [facility]

## 2024-07-01 PROBLEM — G56.03 BILATERAL CARPAL TUNNEL SYNDROME: Status: ACTIVE | Noted: 2024-07-01

## 2024-07-01 PROBLEM — K21.9 GASTROESOPHAGEAL REFLUX DISEASE: Status: ACTIVE | Noted: 2024-07-01

## 2024-07-01 PROBLEM — M05.79 RHEUMATOID ARTHRITIS INVOLVING MULTIPLE SITES WITH POSITIVE RHEUMATOID FACTOR: Status: ACTIVE | Noted: 2024-07-01

## 2024-07-01 PROBLEM — M25.561 ARTHRALGIA OF RIGHT KNEE: Status: ACTIVE | Noted: 2024-07-01

## 2024-07-01 PROBLEM — R76.8 POSITIVE ANA (ANTINUCLEAR ANTIBODY): Status: ACTIVE | Noted: 2024-07-01

## 2024-07-01 PROBLEM — M19.041 PRIMARY OSTEOARTHRITIS OF BOTH HANDS: Status: ACTIVE | Noted: 2024-07-01

## 2024-07-01 PROBLEM — Z79.899 HIGH RISK MEDICATION USE: Status: ACTIVE | Noted: 2024-07-01

## 2024-07-01 PROBLEM — M19.042 PRIMARY OSTEOARTHRITIS OF BOTH HANDS: Status: ACTIVE | Noted: 2024-07-01

## 2024-07-01 NOTE — ASSESSMENT & PLAN NOTE
Failed Fosamax and IV bisphosphonate.  Currently on Prolia per PCP.    She gets her DEXA scans with her PCP as well.

## 2024-07-01 NOTE — PROGRESS NOTES
Office Follow Up      Date: 07/02/2024   Patient Name: Jaqueline Katz  MRN: [id number]  YOB: 1955    Referring Physician: Rosalinda Irizarry*     Chief Complaint: Rheumatoid arthritis.      History of Present Illness: Jaqueline Katz is a 68 y.o. female who is here today for follow up on rheumatoid arthritis.  Fractured L foot in interim. Used ice and boot. No surgery.   Elbows are painful. R thumb is sore. No swelling.   Having R knee pain.   RA is stable.  No SE's with MTX except possible associated oral ulcers. No infections. Other joints are doing well without swelling.    R knee is still unstable.  Had ACL and MCL tears in R knee. Had repair of MCL. Knee is still unstable.   Celebrex helps with joint pain.  Pain scale: 5/10. The problem has not changed. The primary symptoms reported include: pain and stiffness. The following symptoms are not reported:  swelling. The patient assesses the interval disease activity as: stable. The patient's assessment of treatment is: helping some. The patient is not experiencing side effects. The locations affected since last visit are low back and right knee. Associated symptoms include AM stiffness (1 Hours). Pertinent negatives include fever, infection, fatigue, inactivity gelling, eye symptoms, change in vision, sicca complaints, skin lesion(s), chest pain, changing cough, edema, joint swelling and abdominal pain.  Had SINA BSO and bladder repair in interim.   Had COVID in the interim. Did ok.   She has episodes of oral ulcers in mouth.     Subjective   Review of Systems: Review of Systems   Constitutional:  Positive for fatigue and unexpected weight gain. Negative for chills, fever and unexpected weight loss.        Night sweats    HENT:  Positive for sore throat. Negative for dental problem, mouth sores, sinus pressure and swollen glands.         Oral ulcers     Eyes:  Negative for photophobia, pain and redness.   Respiratory:  Negative for  apnea, cough, chest tightness and shortness of breath.    Cardiovascular:  Negative for chest pain and leg swelling.   Gastrointestinal:  Positive for abdominal distention and nausea. Negative for abdominal pain, diarrhea and GERD.        Hemorrhoids   Early satiety    Genitourinary:  Negative for dysuria and hematuria.        Nocturia     Musculoskeletal:         Morning stiffness     Skin:  Negative for dry skin, rash, skin lesions and bruise.        Itching    Allergic/Immunologic: Positive for environmental allergies.   Neurological:  Positive for headache. Negative for dizziness, seizures, syncope, weakness and memory problem.   Hematological:  Negative for adenopathy. Does not bruise/bleed easily.   Psychiatric/Behavioral:  Negative for sleep disturbance, suicidal ideas, depressed mood and stress. The patient is nervous/anxious.    All other systems reviewed and are negative.       Medications:   Current Outpatient Medications:     amLODIPine (NORVASC) 2.5 MG tablet, TAKE ONE TABLET BY MOUTH EVERY DAY FOR BLOOD PRESSURE, Disp: 90 tablet, Rfl: 1    atenolol (TENORMIN) 25 MG tablet, Take 2 tablets by mouth Daily., Disp: 180 tablet, Rfl: 1    busPIRone (BUSPAR) 10 MG tablet, TAKE ONE TABLET BY MOUTH THREE TIMES DAILY, Disp: 90 tablet, Rfl: 5    celecoxib (CeleBREX) 200 MG capsule, TAKE ONE CAPSULE BY MOUTH TWICE DAILY WITH FOOD AS NEEDED, Disp: 90 capsule, Rfl: 1    cetirizine (zyrTEC) 10 MG tablet, Take 1 tablet every day by oral route., Disp: , Rfl:     Cholecalciferol 100 MCG (4000 UT) capsule, Take 1 capsule every day by oral route., Disp: , Rfl:     denosumab (Prolia) 60 MG/ML solution prefilled syringe syringe, 1 mL Every 6 (Six) Months., Disp: , Rfl:     DULoxetine (CYMBALTA) 60 MG capsule, Take 1 capsule by mouth 2 (Two) Times a Day., Disp: 180 capsule, Rfl: 1    esomeprazole (nexIUM) 40 MG capsule, TAKE ONE CAPSULE BY MOUTH EVERY DAY, Disp: 90 capsule, Rfl: 1    estradiol (ESTRACE) 0.1 MG/GM vaginal  cream, Insert 1 g into the vagina 3 (Three) Times a Week., Disp: , Rfl:     FeroSul 325 (65 Fe) MG tablet, TAKE ONE TABLET BY MOUTH EVERY DAY WITH FOOD, Disp: 30 tablet, Rfl: 5    Fiber-Lax 625 MG tablet, TAKE ONE TABLET BY MOUTH TWICE DAILY with 8 ounces of water. MAY take BOTH AT ONCE AND increase UP TO 4 tabs DAILY AS tolerated., Disp: , Rfl:     folic acid (FOLVITE) 1 MG tablet, Take 3 tablets by mouth Daily., Disp: 90 tablet, Rfl: 3    losartan (COZAAR) 100 MG tablet, Take 1 tablet by mouth Daily., Disp: 90 tablet, Rfl: 1    methotrexate 2.5 MG tablet, Take 8 tablets by mouth 1 (One) Time Per Week., Disp: 96 tablet, Rfl: 0    montelukast (SINGULAIR) 10 MG tablet, TAKE ONE TABLET BY MOUTH EVERY EVENING, Disp: 90 tablet, Rfl: 3    olopatadine (PATANOL) 0.1 % ophthalmic solution, Patanol 0.1 % eye drops  INSTILL 1 DROP INTO AFFECTED EYE(S) BY OPHTHALMIC ROUTE 2 TIMES PER DAY AT AN INTERVAL OF 6 TO 8 HOURS, Disp: , Rfl:     ondansetron ODT (ZOFRAN-ODT) 4 MG disintegrating tablet, Place 1 tablet on the tongue Every 8 (Eight) Hours As Needed for Nausea or Vomiting., Disp: 20 tablet, Rfl: 0    Prucalopride Succinate (Motegrity) 2 MG tablet, Take 1 tablet by mouth Daily., Disp: , Rfl:     Semaglutide-Weight Management (Wegovy) 0.25 MG/0.5ML solution auto-injector, Inject 0.5 mL under the skin into the appropriate area as directed 1 (One) Time Per Week. Pt has not started medication yet., Disp: , Rfl:     guaiFENesin (Mucinex) 600 MG 12 hr tablet, Take 1 tablet by mouth 2 (Two) Times a Day., Disp: 20 tablet, Rfl: 0    hydrOXYzine (ATARAX) 25 MG tablet, Take 1 tablet by mouth Daily As Needed for Itching., Disp: , Rfl:     methocarbamol (ROBAXIN) 750 MG tablet, Take 1 tablet by mouth 4 (Four) Times a Day., Disp: , Rfl:     Current Facility-Administered Medications:     denosumab (PROLIA) syringe 60 mg, 60 mg, Subcutaneous, Q6 Months, Rosalinda Irizarry MD, 60 mg at 05/31/24 2260    Allergies:   Allergies   Allergen  "Reactions    Penicillins Hives    Nsaids Nausea And Vomiting       I have reviewed and updated the patient's chief complaint, history of present illness, review of systems, past medical history, surgical history, family history, social history, medications and allergy list as appropriate.     Objective    Vital Signs:   Vitals:    07/02/24 1451   BP: 120/74   Pulse: 79   Temp: 98 °F (36.7 °C)   Weight: 53.5 kg (118 lb)   Height: 149.9 cm (59.02\")   PainSc:   5     Body mass index is 23.82 kg/m².    Physical Exam:  GENERAL:  The patient is well-developed and well nourished.  Cooperative and oriented x3.  Affect is normal.  Hydration appears normal.  HEENT:  Normocephalic and atraumatic.  No notable alopecia.  No facial rash.  Lids and conjunctiva are normal.  Pupils are equal and sclerae are clear.  I see no oral or nasal ulcers.  Lips, teeth, and gums are within normal limits.  Oropharynx is clear.  NECK:  Neck is supple without adenopathy, masses, or thyromegaly.  CARDIOVASCULAR:  Normal S1, S2.  No murmurs are heard.  LUNGS:  Clear to auscultation   ABDOMEN:  Soft and nontender without HSM.   EXTREMITIES:  No edema.  No cyanosis or clubbing.  SKIN:  Palpation and inspection are normal.  I see no rashes, nodules, or nail pits.  NEUROLOGIC:  Gait is normal.   MUSCULOSKELETAL:  Complete joint exam was performed.  There was full range of motion of the shoulders, elbows, wrists, and hands without notable deformities, soft tissue swelling, synovitis, or atrophy except as noted.  Heberden's and Emmanuel's nodes are present.  Hips have good flexion and internal and external rotation.  Knees have no palpable effusions. Crepitus in R knee.  There is full flexion and full extension of the knees without pain.  Ankles have no soft tissue swelling or synovitis or major deformities.   BACK:  Straight without scoliosis.    Joint Exam 07/02/2024     The following joints were examined and normal:   Left Glenohumeral, Right " Glenohumeral, Left Elbow, Right Elbow, Left Wrist, Right Wrist, Left MCP 1, Right MCP 1, Left MCP 2, Right MCP 2, Left MCP 3, Right MCP 3, Left MCP 4, Right MCP 4, Left MCP 5, Right MCP 5, Left IP (thumb), Right IP (thumb), Left PIP 2 (finger), Right PIP 2 (finger), Left PIP 3 (finger), Right PIP 3 (finger), Left PIP 4 (finger), Right PIP 4 (finger), Left PIP 5 (finger), Right PIP 5 (finger), Left Knee, Right Knee       Patient Global: 4 mm  Provider Global: 1 mm    Assessment / Plan      Assessment & Plan  Rheumatoid arthritis involving multiple sites with positive rheumatoid factor  Onset early 2021. Rheumatoid factor and CCP positive.  BRITTA positive.  Pain and swelling in MCP joints and MTP joints.    RA seems to be doing well. No active swelling. Biggest problem is unstable R knee.    Tender joint count is  0, swollen joint count is  0, physician global is 1 , visual analog pain scale is 3, pt global is 4.  CDAI is 5-low disease activity   New lab order provided to continue labs every 6 weeks  Continue MTX at 20 mg weekly.   Increase folic acid to 3 q day for oral ulcers. .   Recheck in 3 months  High risk medication use  Methotrexate  Well-tolerated.  Primary osteoarthritis of both hands  She has Heberden's and Emmanuel's nodes.  Stable  Age-related osteoporosis without current pathological fracture  Failed Fosamax and IV bisphosphonate.  Currently on Prolia per PCP.    She gets her DEXA scans with her PCP as well.  Positive BRITTA (antinuclear antibody)  1: 640 homogenous.  Subtypes all -10/21.  No evidence of BRITTA associated disease.  Gastroesophageal reflux disease, unspecified whether esophagitis present  Tolerating Celebrex.  Bilateral carpal tunnel syndrome  Status post bilateral release with good results.  Arthralgia of right knee  History of ACL and MCL tears with instability and chronic pain.  MCL was repaired but she is still having pain and instability.    Orders Placed This Encounter   Procedures    CBC  Auto Differential    Comprehensive Metabolic Panel    C-reactive Protein    Sedimentation Rate     New Medications Ordered This Visit   Medications    folic acid (FOLVITE) 1 MG tablet     Sig: Take 3 tablets by mouth Daily.     Dispense:  90 tablet     Refill:  3    methotrexate 2.5 MG tablet     Sig: Take 8 tablets by mouth 1 (One) Time Per Week.     Dispense:  96 tablet     Refill:  0          Follow Up:   Return in about 3 months (around 10/2/2024).        Tony Faria MD  [facility] [place]

## 2024-07-01 NOTE — ASSESSMENT & PLAN NOTE
Onset early 2021. Rheumatoid factor and CCP positive.  BRITTA positive.  Pain and swelling in MCP joints and MTP joints.    RA seems to be doing well. No active swelling. Biggest problem is unstable R knee.    Tender joint count is  0, swollen joint count is  0, physician global is 1 , visual analog pain scale is 3, pt global is 4.  CDAI is 5-low disease activity   New lab order provided to continue labs every 6 weeks  Continue MTX at 20 mg weekly.   Increase folic acid to 3 q day for oral ulcers. .   Recheck in 3 months

## 2024-07-01 NOTE — ASSESSMENT & PLAN NOTE
History of ACL and MCL tears with instability and chronic pain.  MCL was repaired but she is still having pain and instability.

## 2024-07-02 ENCOUNTER — OFFICE VISIT (OUTPATIENT)
Age: 69
End: 2024-07-02
Payer: MEDICARE

## 2024-07-02 VITALS
SYSTOLIC BLOOD PRESSURE: 120 MMHG | WEIGHT: 118 LBS | BODY MASS INDEX: 23.79 KG/M2 | HEART RATE: 79 BPM | DIASTOLIC BLOOD PRESSURE: 74 MMHG | HEIGHT: 59 IN | TEMPERATURE: 98 F

## 2024-07-02 DIAGNOSIS — M81.0 AGE-RELATED OSTEOPOROSIS WITHOUT CURRENT PATHOLOGICAL FRACTURE: ICD-10-CM

## 2024-07-02 DIAGNOSIS — G56.03 BILATERAL CARPAL TUNNEL SYNDROME: ICD-10-CM

## 2024-07-02 DIAGNOSIS — K21.9 GASTROESOPHAGEAL REFLUX DISEASE, UNSPECIFIED WHETHER ESOPHAGITIS PRESENT: ICD-10-CM

## 2024-07-02 DIAGNOSIS — M25.561 ARTHRALGIA OF RIGHT KNEE: ICD-10-CM

## 2024-07-02 DIAGNOSIS — Z79.899 HIGH RISK MEDICATION USE: ICD-10-CM

## 2024-07-02 DIAGNOSIS — R76.8 POSITIVE ANA (ANTINUCLEAR ANTIBODY): ICD-10-CM

## 2024-07-02 DIAGNOSIS — M19.042 PRIMARY OSTEOARTHRITIS OF BOTH HANDS: ICD-10-CM

## 2024-07-02 DIAGNOSIS — M05.79 RHEUMATOID ARTHRITIS INVOLVING MULTIPLE SITES WITH POSITIVE RHEUMATOID FACTOR: Primary | ICD-10-CM

## 2024-07-02 DIAGNOSIS — M19.041 PRIMARY OSTEOARTHRITIS OF BOTH HANDS: ICD-10-CM

## 2024-07-02 PROCEDURE — 1160F RVW MEDS BY RX/DR IN RCRD: CPT | Performed by: INTERNAL MEDICINE

## 2024-07-02 PROCEDURE — 3074F SYST BP LT 130 MM HG: CPT | Performed by: INTERNAL MEDICINE

## 2024-07-02 PROCEDURE — 3078F DIAST BP <80 MM HG: CPT | Performed by: INTERNAL MEDICINE

## 2024-07-02 PROCEDURE — 1159F MED LIST DOCD IN RCRD: CPT | Performed by: INTERNAL MEDICINE

## 2024-07-02 PROCEDURE — 99214 OFFICE O/P EST MOD 30 MIN: CPT | Performed by: INTERNAL MEDICINE

## 2024-07-02 RX ORDER — SEMAGLUTIDE 0.25 MG/.5ML
0.25 INJECTION, SOLUTION SUBCUTANEOUS WEEKLY
COMMUNITY

## 2024-07-02 RX ORDER — METHOTREXATE 2.5 MG/1
20 TABLET ORAL WEEKLY
Qty: 96 TABLET | Refills: 0 | Status: SHIPPED | OUTPATIENT
Start: 2024-07-02

## 2024-07-02 RX ORDER — FOLIC ACID 1 MG/1
3 TABLET ORAL DAILY
Qty: 90 TABLET | Refills: 3 | Status: SHIPPED | OUTPATIENT
Start: 2024-07-02

## 2024-08-08 ENCOUNTER — OFFICE VISIT (OUTPATIENT)
Dept: PODIATRY | Facility: CLINIC | Age: 69
End: 2024-08-08
Payer: MEDICARE

## 2024-08-08 VITALS
BODY MASS INDEX: 23.39 KG/M2 | TEMPERATURE: 96.9 F | HEART RATE: 74 BPM | SYSTOLIC BLOOD PRESSURE: 120 MMHG | DIASTOLIC BLOOD PRESSURE: 78 MMHG | OXYGEN SATURATION: 98 % | HEIGHT: 59 IN | WEIGHT: 116 LBS

## 2024-08-08 DIAGNOSIS — S92.355D CLOSED NONDISPLACED FRACTURE OF FIFTH METATARSAL BONE OF LEFT FOOT WITH ROUTINE HEALING, SUBSEQUENT ENCOUNTER: ICD-10-CM

## 2024-08-08 DIAGNOSIS — M79.672 LEFT FOOT PAIN: Primary | ICD-10-CM

## 2024-08-08 RX ORDER — ONDANSETRON 8 MG/1
1 TABLET, ORALLY DISINTEGRATING ORAL 3 TIMES DAILY
COMMUNITY
Start: 2024-07-10

## 2024-08-08 NOTE — PROGRESS NOTES
[facility] - PODIATRY    Today's Date: 08/08/24    Patient Name: Jaqueline Katz  MRN: [id number]  CSN: [id number]  PCP: Rosalinda Irizarry MD,  Referring Provider: No ref. provider found    SUBJECTIVE     Chief Complaint   Patient presents with    Left Foot - Follow-up     5th metatarsal fx   Unable to wear boot due to heat     HPI: Jaqueline Katz, a 68 y.o.female, presents to clinic.    Patient is a 68-year-old female presenting with left foot pain.  Patient states has been hurting for about 4 to 6 weeks.  She says she does not remember specifically injuring it.  Patient states it is sore to the touch.  She has not worn a boot.    8.8.24 - improving, but unable to wear boot due to heat.   Past Medical History:   Diagnosis Date    Age-related osteoporosis without current pathological fracture     Allergic rhinitis due to pollen     Anemia     Anxiety     Arthritis     Bunion     Cancer     Contact with and (suspected) exposure to other viral communicable diseases     Depression     Difficulty walking     Essential (primary) hypertension     Gastro-esophageal reflux disease without esophagitis     Hypertension     Mixed irritable bowel syndrome     Nausea with vomiting, unspecified     Osteopenia     Osteoporosis     ON PROLIA    Primary osteoarthritis of both ankles     Primary osteoarthritis of both feet     Primary osteoarthritis of both hands     Pulmonary arterial hypertension     Stress fracture      Past Surgical History:   Procedure Laterality Date    APPENDECTOMY      CHOLECYSTECTOMY      ROTATOR CUFF REPAIR      SHOULDER SURGERY Left      Family History   Problem Relation Age of Onset    Heart failure Mother     Hyperthyroidism Mother     Hypertension Mother     Heart attack Father     Hypertension Father     Diabetes type II Sister     Hyperthyroidism Sister     Cancer Sister     Hyperlipidemia Sister     Hyperlipidemia Sister     Hyperlipidemia Sister     Alcohol abuse Brother      Hyperlipidemia Brother     Hyperlipidemia Brother     OCD Son     Hyperthyroidism Nephew     Aortic stenosis Other     Stroke Other      Social History     Socioeconomic History    Marital status:     Number of children: 3   Tobacco Use    Smoking status: Former     Current packs/day: 0.00     Average packs/day: 1.5 packs/day for 25.0 years (37.5 ttl pk-yrs)     Types: Cigarettes     Start date: 1985     Quit date: 2010     Years since quittin.6     Passive exposure: Past    Smokeless tobacco: Never   Vaping Use    Vaping status: Never Used   Substance and Sexual Activity    Alcohol use: Yes     Alcohol/week: 1.0 standard drink of alcohol     Types: 1 Glasses of wine per week     Comment: Wine maybe twice a week at dinner    Drug use: Not Currently    Sexual activity: Yes     Partners: Male     Comment: Too old     Allergies   Allergen Reactions    Penicillins Hives    Nsaids Nausea And Vomiting     Current Outpatient Medications   Medication Sig Dispense Refill    amLODIPine (NORVASC) 2.5 MG tablet TAKE ONE TABLET BY MOUTH EVERY DAY FOR BLOOD PRESSURE 90 tablet 1    atenolol (TENORMIN) 25 MG tablet Take 2 tablets by mouth Daily. 180 tablet 1    busPIRone (BUSPAR) 10 MG tablet TAKE ONE TABLET BY MOUTH THREE TIMES DAILY 90 tablet 5    celecoxib (CeleBREX) 200 MG capsule TAKE ONE CAPSULE BY MOUTH TWICE DAILY WITH FOOD AS NEEDED 90 capsule 1    cetirizine (zyrTEC) 10 MG tablet Take 1 tablet every day by oral route.      Cholecalciferol 100 MCG (4000 UT) capsule Take 1 capsule every day by oral route.      denosumab (Prolia) 60 MG/ML solution prefilled syringe syringe 1 mL Every 6 (Six) Months.      DULoxetine (CYMBALTA) 60 MG capsule Take 1 capsule by mouth 2 (Two) Times a Day. 180 capsule 1    esomeprazole (nexIUM) 40 MG capsule TAKE ONE CAPSULE BY MOUTH EVERY DAY 90 capsule 1    estradiol (ESTRACE) 0.1 MG/GM vaginal cream Insert 1 g into the vagina 3 (Three) Times a Week.      FeroSul 325 (65 Fe)  MG tablet TAKE ONE TABLET BY MOUTH EVERY DAY WITH FOOD 30 tablet 5    Fiber-Lax 625 MG tablet TAKE ONE TABLET BY MOUTH TWICE DAILY with 8 ounces of water. MAY take BOTH AT ONCE AND increase UP TO 4 tabs DAILY AS tolerated.      folic acid (FOLVITE) 1 MG tablet Take 3 tablets by mouth Daily. 90 tablet 3    guaiFENesin (Mucinex) 600 MG 12 hr tablet Take 1 tablet by mouth 2 (Two) Times a Day. 20 tablet 0    losartan (COZAAR) 100 MG tablet Take 1 tablet by mouth Daily. 90 tablet 1    methotrexate 2.5 MG tablet Take 8 tablets by mouth 1 (One) Time Per Week. 96 tablet 0    montelukast (SINGULAIR) 10 MG tablet TAKE ONE TABLET BY MOUTH EVERY EVENING 90 tablet 3    olopatadine (PATANOL) 0.1 % ophthalmic solution Patanol 0.1 % eye drops   INSTILL 1 DROP INTO AFFECTED EYE(S) BY OPHTHALMIC ROUTE 2 TIMES PER DAY AT AN INTERVAL OF 6 TO 8 HOURS      ondansetron ODT (ZOFRAN-ODT) 8 MG disintegrating tablet Take 1 tablet by mouth 3 times a day.      Prucalopride Succinate (Motegrity) 2 MG tablet Take 1 tablet by mouth Daily.      Semaglutide-Weight Management (Wegovy) 0.25 MG/0.5ML solution auto-injector Inject 0.5 mL under the skin into the appropriate area as directed 1 (One) Time Per Week. Pt has not started medication yet.      ondansetron ODT (ZOFRAN-ODT) 4 MG disintegrating tablet Place 1 tablet on the tongue Every 8 (Eight) Hours As Needed for Nausea or Vomiting. (Patient not taking: Reported on 8/8/2024) 20 tablet 0     Current Facility-Administered Medications   Medication Dose Route Frequency Provider Last Rate Last Admin    denosumab (PROLIA) syringe 60 mg  60 mg Subcutaneous Q6 Months Rosalinda Irizarry MD   60 mg at 05/31/24 1447     Review of Systems   Constitutional: Negative.    HENT: Negative.     Eyes: Negative.    Respiratory: Negative.     Cardiovascular: Negative.    Gastrointestinal: Negative.    Endocrine: Negative.    Genitourinary: Negative.    Musculoskeletal: Negative.         Left foot pain   Skin:  Negative.    Allergic/Immunologic: Negative.    Neurological: Negative.    Hematological: Negative.    Psychiatric/Behavioral: Negative.     All other systems reviewed and are negative.      OBJECTIVE     Vitals:    08/08/24 1327   BP: 120/78   Pulse: 74   Temp: 96.9 °F (36.1 °C)   SpO2: 98%       WBC   Date Value Ref Range Status   06/20/2024 8.25 3.40 - 10.80 10*3/mm3 Final   05/16/2024 7.12 3.70 - 10.30 10*3/uL Final     RBC   Date Value Ref Range Status   06/20/2024 3.84 3.77 - 5.28 10*6/mm3 Final   05/16/2024 3.01 (L) 3.90 - 5.20 10*6/uL Final     Hemoglobin   Date Value Ref Range Status   06/20/2024 11.6 (L) 12.0 - 15.9 g/dL Final   05/16/2024 9.0 (L) 11.2 - 15.7 g/dL Final     Hematocrit   Date Value Ref Range Status   06/20/2024 35.2 34.0 - 46.6 % Final   05/16/2024 28.2 (L) 34.0 - 45.0 % Final     MCV   Date Value Ref Range Status   06/20/2024 91.7 79.0 - 97.0 fL Final   05/16/2024 94 79 - 98 fL Final     MCH   Date Value Ref Range Status   06/20/2024 30.2 26.6 - 33.0 pg Final   05/16/2024 29.9 26.0 - 32.0 pg Final     MCHC   Date Value Ref Range Status   06/20/2024 33.0 31.5 - 35.7 g/dL Final   05/16/2024 31.9 30.7 - 35.5 g/dL Final     RDW   Date Value Ref Range Status   06/20/2024 13.8 12.3 - 15.4 % Final   05/16/2024 16.6 (H) 11.5 - 14.5 % Final     RDW-SD   Date Value Ref Range Status   06/20/2024 45.1 37.0 - 54.0 fl Final     MPV   Date Value Ref Range Status   06/20/2024 9.6 6.0 - 12.0 fL Final   05/16/2024 10.3 8.8 - 12.5 fL Final     Platelets   Date Value Ref Range Status   06/20/2024 279 140 - 450 10*3/mm3 Final   05/16/2024 183 155 - 369 10*3/uL Final     Neutrophil %   Date Value Ref Range Status   06/20/2024 62.9 42.7 - 76.0 % Final     Lymphocyte %   Date Value Ref Range Status   06/20/2024 22.9 19.6 - 45.3 % Final     Monocyte %   Date Value Ref Range Status   06/20/2024 11.4 5.0 - 12.0 % Final     Eosinophil %   Date Value Ref Range Status   06/20/2024 1.7 0.3 - 6.2 % Final     Basophil %    Date Value Ref Range Status   06/20/2024 0.6 0.0 - 1.5 % Final     Immature Grans %   Date Value Ref Range Status   06/20/2024 0.5 0.0 - 0.5 % Final     Neutrophils, Absolute   Date Value Ref Range Status   06/20/2024 5.19 1.70 - 7.00 10*3/mm3 Final     Lymphocytes, Absolute   Date Value Ref Range Status   06/20/2024 1.89 0.70 - 3.10 10*3/mm3 Final     Monocytes, Absolute   Date Value Ref Range Status   06/20/2024 0.94 (H) 0.10 - 0.90 10*3/mm3 Final     Eosinophils, Absolute   Date Value Ref Range Status   06/20/2024 0.14 0.00 - 0.40 10*3/mm3 Final     Basophils, Absolute   Date Value Ref Range Status   06/20/2024 0.05 0.00 - 0.20 10*3/mm3 Final     Immature Grans, Absolute   Date Value Ref Range Status   06/20/2024 0.04 0.00 - 0.05 10*3/mm3 Final     nRBC   Date Value Ref Range Status   06/20/2024 0.0 0.0 - 0.2 /100 WBC Final         Lab Results   Component Value Date    GLUCOSE 78 06/20/2024    BUN 17 06/20/2024    CREATININE 0.89 06/20/2024    EGFRIFNONA 59 (L) 02/07/2022    BCR 19.1 06/20/2024    K 4.6 06/20/2024    CO2 22.3 06/20/2024    CALCIUM 8.7 06/20/2024    ALBUMIN 4.0 06/20/2024    LABIL2 1.5 03/24/2021    AST 50 (H) 06/20/2024    ALT 49 (H) 06/20/2024       Patient seen in no apparent distress.      PHYSICAL EXAM:     Foot/Ankle Exam    GENERAL  Appearance:  appears stated age  Orientation:  AAOx3  Affect:  appropriate  Gait:  unimpaired  Assistance:  independent  Right shoe gear: casual shoe  Left shoe gear: casual shoe    VASCULAR     Right Foot Vascularity   Normal vascular exam    Dorsalis pedis:  2+  Posterior tibial:  2+  Skin temperature:  warm  Edema grading:  None  CFT:  < 3 seconds  Pedal hair growth:  Present  Varicosities:  none     Left Foot Vascularity   Normal vascular exam    Dorsalis pedis:  2+  Posterior tibial:  2+  Skin temperature:  warm  Edema grading:  None  CFT:  < 3 seconds  Pedal hair growth:  Present  Varicosities:  none     NEUROLOGIC     Right Foot Neurologic   Normal  sensation    Light touch sensation: normal  Vibratory sensation: normal  Hot/Cold sensation: normal  Protective Sensation using Lincoln-Elisabeth Monofilament:   Sites intact: 10  Sites tested: 10     Left Foot Neurologic   Normal sensation    Light touch sensation: normal  Vibratory sensation: normal  Hot/Cold sensation:  normal  Protective Sensation using Lincoln-Elisabeth Monofilament:   Sites intact: 10  Sites tested: 10    MUSCULOSKELETAL     Left Foot Musculoskeletal   Tenderness:  fifth metatarsal base tenderness    MUSCLE STRENGTH     Right Foot Muscle Strength   Foot dorsiflexion:  4  Foot plantar flexion:  4  Foot inversion:  4  Foot eversion:  4     Left Foot Muscle Strength   Foot dorsiflexion:  4  Foot plantar flexion:  4  Foot inversion:  4  Foot eversion:  4    RANGE OF MOTION     Right Foot Range of Motion   Foot and ankle ROM within normal limits       Left Foot Range of Motion   Foot and ankle ROM within normal limits      DERMATOLOGIC      Right Foot Dermatologic   Skin  Right foot skin is intact.      Left Foot Dermatologic   Skin  Left foot skin is intact.       RADIOLOGY:      No results found.    ASSESSMENT/PLAN     Diagnoses and all orders for this visit:    1. Left foot pain (Primary)    2. Closed nondisplaced fracture of fifth metatarsal bone of left foot with routine healing, subsequent encounter        Comprehensive lower extremity examination and evaluation was performed.    Patient to continue stretching exercises and icing in the evening as tolerated. Discussed compression therapy and resting the extremity.  Anti-inflammatory medication to begin taking if okay by PCP.    Weightbearing as tolerated     Return to clinic in 2 months    Discussed findings and treatment plan including risks, benefits, and treatment options with patient in detail. Patient agreed with treatment plan.    Medications and allergies reviewed.  Reviewed available lab values along with other pertinent labs.  These  were discussed with the patient.    An After Visit Summary was printed and given to the patient at discharge, including (if requested) any available informative/educational handouts regarding diagnosis, treatment, or medications. All questions were answered to patient/family satisfaction. Should symptoms fail to improve or worsen they agree to call or return to clinic or to go to the Emergency Department. Discussed the importance of following up with any needed screening tests/labs/specialist appointments and any requested follow-up recommended by me today. Importance of maintaining follow-up discussed and patient accepts that missed appointments can delay diagnosis and potentially lead to worsening of conditions.    Return in about 3 months (around 11/8/2024)., or sooner if acute issues arise.    This document has been electronically signed by Jules Bosch DPM on August 8, 2024 13:42 EDT

## 2024-08-15 ENCOUNTER — LAB (OUTPATIENT)
Dept: LAB | Facility: HOSPITAL | Age: 69
End: 2024-08-15
Payer: MEDICARE

## 2024-08-15 DIAGNOSIS — Z79.899 HIGH RISK MEDICATION USE: ICD-10-CM

## 2024-08-15 DIAGNOSIS — M05.79 RHEUMATOID ARTHRITIS INVOLVING MULTIPLE SITES WITH POSITIVE RHEUMATOID FACTOR: ICD-10-CM

## 2024-08-15 LAB
ALBUMIN SERPL-MCNC: 4 G/DL (ref 3.5–5.2)
ALBUMIN/GLOB SERPL: 1.8 G/DL
ALP SERPL-CCNC: 47 U/L (ref 39–117)
ALT SERPL W P-5'-P-CCNC: 19 U/L (ref 1–33)
ANION GAP SERPL CALCULATED.3IONS-SCNC: 7.5 MMOL/L (ref 5–15)
AST SERPL-CCNC: 21 U/L (ref 1–32)
BASOPHILS # BLD AUTO: 0.07 10*3/MM3 (ref 0–0.2)
BASOPHILS NFR BLD AUTO: 1.2 % (ref 0–1.5)
BILIRUB SERPL-MCNC: 0.3 MG/DL (ref 0–1.2)
BUN SERPL-MCNC: 22 MG/DL (ref 8–23)
BUN/CREAT SERPL: 25.6 (ref 7–25)
CALCIUM SPEC-SCNC: 9 MG/DL (ref 8.6–10.5)
CHLORIDE SERPL-SCNC: 106 MMOL/L (ref 98–107)
CO2 SERPL-SCNC: 23.5 MMOL/L (ref 22–29)
CREAT SERPL-MCNC: 0.86 MG/DL (ref 0.57–1)
CRP SERPL-MCNC: <0.3 MG/DL (ref 0–0.5)
DEPRECATED RDW RBC AUTO: 46.8 FL (ref 37–54)
EGFRCR SERPLBLD CKD-EPI 2021: 73.2 ML/MIN/1.73
EOSINOPHIL # BLD AUTO: 0.1 10*3/MM3 (ref 0–0.4)
EOSINOPHIL NFR BLD AUTO: 1.7 % (ref 0.3–6.2)
ERYTHROCYTE [DISTWIDTH] IN BLOOD BY AUTOMATED COUNT: 13.8 % (ref 12.3–15.4)
ERYTHROCYTE [SEDIMENTATION RATE] IN BLOOD: 1 MM/HR (ref 0–30)
GLOBULIN UR ELPH-MCNC: 2.2 GM/DL
GLUCOSE SERPL-MCNC: 103 MG/DL (ref 65–99)
HCT VFR BLD AUTO: 37.5 % (ref 34–46.6)
HGB BLD-MCNC: 12 G/DL (ref 12–15.9)
IMM GRANULOCYTES # BLD AUTO: 0.01 10*3/MM3 (ref 0–0.05)
IMM GRANULOCYTES NFR BLD AUTO: 0.2 % (ref 0–0.5)
LYMPHOCYTES # BLD AUTO: 1.46 10*3/MM3 (ref 0.7–3.1)
LYMPHOCYTES NFR BLD AUTO: 24.1 % (ref 19.6–45.3)
MCH RBC QN AUTO: 29.8 PG (ref 26.6–33)
MCHC RBC AUTO-ENTMCNC: 32 G/DL (ref 31.5–35.7)
MCV RBC AUTO: 93.1 FL (ref 79–97)
MONOCYTES # BLD AUTO: 0.46 10*3/MM3 (ref 0.1–0.9)
MONOCYTES NFR BLD AUTO: 7.6 % (ref 5–12)
NEUTROPHILS NFR BLD AUTO: 3.96 10*3/MM3 (ref 1.7–7)
NEUTROPHILS NFR BLD AUTO: 65.2 % (ref 42.7–76)
PLATELET # BLD AUTO: 291 10*3/MM3 (ref 140–450)
PMV BLD AUTO: 9.5 FL (ref 6–12)
POTASSIUM SERPL-SCNC: 4 MMOL/L (ref 3.5–5.2)
PROT SERPL-MCNC: 6.2 G/DL (ref 6–8.5)
RBC # BLD AUTO: 4.03 10*6/MM3 (ref 3.77–5.28)
SODIUM SERPL-SCNC: 137 MMOL/L (ref 136–145)
WBC NRBC COR # BLD AUTO: 6.06 10*3/MM3 (ref 3.4–10.8)

## 2024-08-15 PROCEDURE — 36415 COLL VENOUS BLD VENIPUNCTURE: CPT

## 2024-08-15 PROCEDURE — 80053 COMPREHEN METABOLIC PANEL: CPT

## 2024-08-15 PROCEDURE — 86140 C-REACTIVE PROTEIN: CPT

## 2024-08-15 PROCEDURE — 85025 COMPLETE CBC W/AUTO DIFF WBC: CPT

## 2024-08-15 PROCEDURE — 85652 RBC SED RATE AUTOMATED: CPT

## 2024-08-27 DIAGNOSIS — I10 ESSENTIAL HYPERTENSION: ICD-10-CM

## 2024-08-27 DIAGNOSIS — K21.9 GASTROESOPHAGEAL REFLUX DISEASE WITHOUT ESOPHAGITIS: ICD-10-CM

## 2024-08-27 RX ORDER — LOSARTAN POTASSIUM 100 MG/1
100 TABLET ORAL DAILY
Qty: 90 TABLET | Refills: 1 | Status: SHIPPED | OUTPATIENT
Start: 2024-08-27

## 2024-08-27 RX ORDER — ESOMEPRAZOLE MAGNESIUM 40 MG/1
CAPSULE, DELAYED RELEASE ORAL
Qty: 90 CAPSULE | Refills: 1 | Status: SHIPPED | OUTPATIENT
Start: 2024-08-27

## 2024-10-15 ENCOUNTER — LAB (OUTPATIENT)
Dept: LAB | Facility: HOSPITAL | Age: 69
End: 2024-10-15
Payer: MEDICARE

## 2024-10-15 DIAGNOSIS — Z79.899 HIGH RISK MEDICATION USE: ICD-10-CM

## 2024-10-15 DIAGNOSIS — M05.79 RHEUMATOID ARTHRITIS INVOLVING MULTIPLE SITES WITH POSITIVE RHEUMATOID FACTOR: ICD-10-CM

## 2024-10-15 LAB
ALBUMIN SERPL-MCNC: 4.2 G/DL (ref 3.5–5.2)
ALBUMIN/GLOB SERPL: 1.5 G/DL
ALP SERPL-CCNC: 64 U/L (ref 39–117)
ALT SERPL W P-5'-P-CCNC: 14 U/L (ref 1–33)
ANION GAP SERPL CALCULATED.3IONS-SCNC: 7 MMOL/L (ref 5–15)
AST SERPL-CCNC: 17 U/L (ref 1–32)
BASOPHILS # BLD AUTO: 0.09 10*3/MM3 (ref 0–0.2)
BASOPHILS NFR BLD AUTO: 1.2 % (ref 0–1.5)
BILIRUB SERPL-MCNC: 0.2 MG/DL (ref 0–1.2)
BUN SERPL-MCNC: 16 MG/DL (ref 8–23)
BUN/CREAT SERPL: 17 (ref 7–25)
CALCIUM SPEC-SCNC: 9.2 MG/DL (ref 8.6–10.5)
CHLORIDE SERPL-SCNC: 107 MMOL/L (ref 98–107)
CO2 SERPL-SCNC: 24 MMOL/L (ref 22–29)
CREAT SERPL-MCNC: 0.94 MG/DL (ref 0.57–1)
CRP SERPL-MCNC: <0.3 MG/DL (ref 0–0.5)
DEPRECATED RDW RBC AUTO: 48.5 FL (ref 37–54)
EGFRCR SERPLBLD CKD-EPI 2021: 65.4 ML/MIN/1.73
EOSINOPHIL # BLD AUTO: 0.14 10*3/MM3 (ref 0–0.4)
EOSINOPHIL NFR BLD AUTO: 1.9 % (ref 0.3–6.2)
ERYTHROCYTE [DISTWIDTH] IN BLOOD BY AUTOMATED COUNT: 14.4 % (ref 12.3–15.4)
ERYTHROCYTE [SEDIMENTATION RATE] IN BLOOD: 9 MM/HR (ref 0–30)
GLOBULIN UR ELPH-MCNC: 2.8 GM/DL
GLUCOSE SERPL-MCNC: 92 MG/DL (ref 65–99)
HCT VFR BLD AUTO: 40.2 % (ref 34–46.6)
HGB BLD-MCNC: 12.9 G/DL (ref 12–15.9)
IMM GRANULOCYTES # BLD AUTO: 0.03 10*3/MM3 (ref 0–0.05)
IMM GRANULOCYTES NFR BLD AUTO: 0.4 % (ref 0–0.5)
LYMPHOCYTES # BLD AUTO: 1.49 10*3/MM3 (ref 0.7–3.1)
LYMPHOCYTES NFR BLD AUTO: 20.1 % (ref 19.6–45.3)
MCH RBC QN AUTO: 29.7 PG (ref 26.6–33)
MCHC RBC AUTO-ENTMCNC: 32.1 G/DL (ref 31.5–35.7)
MCV RBC AUTO: 92.4 FL (ref 79–97)
MONOCYTES # BLD AUTO: 0.7 10*3/MM3 (ref 0.1–0.9)
MONOCYTES NFR BLD AUTO: 9.4 % (ref 5–12)
NEUTROPHILS NFR BLD AUTO: 4.98 10*3/MM3 (ref 1.7–7)
NEUTROPHILS NFR BLD AUTO: 67 % (ref 42.7–76)
PLATELET # BLD AUTO: 340 10*3/MM3 (ref 140–450)
PMV BLD AUTO: 9 FL (ref 6–12)
POTASSIUM SERPL-SCNC: 4.2 MMOL/L (ref 3.5–5.2)
PROT SERPL-MCNC: 7 G/DL (ref 6–8.5)
RBC # BLD AUTO: 4.35 10*6/MM3 (ref 3.77–5.28)
SODIUM SERPL-SCNC: 138 MMOL/L (ref 136–145)
WBC NRBC COR # BLD AUTO: 7.43 10*3/MM3 (ref 3.4–10.8)

## 2024-10-15 PROCEDURE — 36415 COLL VENOUS BLD VENIPUNCTURE: CPT

## 2024-10-15 PROCEDURE — 80053 COMPREHEN METABOLIC PANEL: CPT

## 2024-10-15 PROCEDURE — 85652 RBC SED RATE AUTOMATED: CPT

## 2024-10-15 PROCEDURE — 86140 C-REACTIVE PROTEIN: CPT

## 2024-10-15 PROCEDURE — 85025 COMPLETE CBC W/AUTO DIFF WBC: CPT

## 2024-10-16 NOTE — ASSESSMENT & PLAN NOTE
Onset early 2021. Rheumatoid factor and CCP positive.  BRITTA positive.  Pain and swelling in MCP joints and MTP joints.    RA continues to do well. No active swelling.   Tender joint count is  0, swollen joint count is  0, physician global is 1 , visual analog pain scale is 2, pt global is 2.  CDAI is 3-low disease activity   New lab order provided to continue labs every 6 weeks  Continue MTX at 20 mg weekly.   Take folic acid 3 q day for oral ulcers. .   Recheck in 3 months

## 2024-10-16 NOTE — PROGRESS NOTES
Office Follow Up      Date: 10/18/2024   Patient Name: Jaqueline Katz  MRN: [id number]  YOB: 1955    Referring Physician: Rosalinda Irizarry*     Chief Complaint: Rheumatoid arthritis.      History of Present Illness: Jaqueline Katz is a 69 y.o. female who is here today for follow up on rheumatoid arthritis.  Doing well. No swelling.   Fractured foot healed but still with some pain.   RA is stable.  No SE's with MTX except possible associated oral ulcers. No infections. Joints are doing well without swelling.    R knee is still unstable.  Had ACL and MCL tears in R knee. Had repair of MCL.   Celebrex helps with joint pain.  Pain scale: 2/10.  EMS is 1 hr. No night pain  The problem has not changed. The primary symptoms reported include: pain and stiffness. The following symptoms are not reported:  swelling. The patient assesses the interval disease activity as: stable. The patient's assessment of treatment is: helping some. The patient is not experiencing side effects. The locations affected since last visit are low back and right knee. Associated symptoms include AM stiffness (1 Hours). Pertinent negatives include fever, infection, fatigue, inactivity gelling, eye symptoms, change in vision, sicca complaints, skin lesion(s), chest pain, changing cough, edema, joint swelling and abdominal pain.  Has been well otherwise.     Subjective   Review of Systems: Review of Systems   Constitutional:  Positive for fatigue and unexpected weight gain. Negative for chills, fever and unexpected weight loss.        Night sweats    HENT:  Positive for sore throat. Negative for dental problem, mouth sores, sinus pressure and swollen glands.         Oral ulcers     Eyes:  Negative for photophobia, pain and redness.   Respiratory:  Negative for apnea, cough, chest tightness and shortness of breath.    Cardiovascular:  Negative for chest pain and leg swelling.   Gastrointestinal:  Positive for  abdominal distention and nausea. Negative for abdominal pain, diarrhea and GERD.        Hemorrhoids   Early satiety    Genitourinary:  Negative for dysuria and hematuria.        Nocturia     Musculoskeletal:         Morning stiffness     Skin:  Negative for dry skin, rash, skin lesions and bruise.        Itching    Allergic/Immunologic: Positive for environmental allergies.   Neurological:  Positive for headache. Negative for dizziness, seizures, syncope, weakness and memory problem.   Hematological:  Negative for adenopathy. Does not bruise/bleed easily.   Psychiatric/Behavioral:  Negative for sleep disturbance, suicidal ideas, depressed mood and stress. The patient is nervous/anxious.    All other systems reviewed and are negative.       Medications:   Current Outpatient Medications:     amLODIPine (NORVASC) 2.5 MG tablet, TAKE ONE TABLET BY MOUTH EVERY DAY FOR BLOOD PRESSURE, Disp: 90 tablet, Rfl: 1    atenolol (TENORMIN) 25 MG tablet, Take 2 tablets by mouth Daily., Disp: 180 tablet, Rfl: 1    busPIRone (BUSPAR) 10 MG tablet, TAKE ONE TABLET BY MOUTH THREE TIMES DAILY, Disp: 90 tablet, Rfl: 5    celecoxib (CeleBREX) 200 MG capsule, TAKE ONE CAPSULE BY MOUTH TWICE DAILY WITH FOOD AS NEEDED, Disp: 90 capsule, Rfl: 1    cetirizine (zyrTEC) 10 MG tablet, Take 1 tablet every day by oral route., Disp: , Rfl:     denosumab (Prolia) 60 MG/ML solution prefilled syringe syringe, 1 mL Every 6 (Six) Months., Disp: , Rfl:     DULoxetine (CYMBALTA) 60 MG capsule, Take 1 capsule by mouth 2 (Two) Times a Day., Disp: 180 capsule, Rfl: 1    esomeprazole (nexIUM) 40 MG capsule, TAKE ONE CAPSULE BY MOUTH EVERY DAY, Disp: 90 capsule, Rfl: 1    estradiol (ESTRACE) 0.1 MG/GM vaginal cream, Insert 1 g into the vagina 3 (Three) Times a Week., Disp: , Rfl:     folic acid (FOLVITE) 1 MG tablet, Take 3 tablets by mouth Daily., Disp: 90 tablet, Rfl: 3    Linzess 145 MCG capsule capsule, Take 1 capsule by mouth Every Morning., Disp: , Rfl:      losartan (COZAAR) 100 MG tablet, TAKE ONE TABLET BY MOUTH EVERY DAY FOR BLOOD PRESSURE, Disp: 90 tablet, Rfl: 1    methotrexate 2.5 MG tablet, Take 8 tablets by mouth 1 (One) Time Per Week., Disp: 96 tablet, Rfl: 0    montelukast (SINGULAIR) 10 MG tablet, TAKE ONE TABLET BY MOUTH EVERY EVENING, Disp: 90 tablet, Rfl: 3    olopatadine (PATANOL) 0.1 % ophthalmic solution, Patanol 0.1 % eye drops  INSTILL 1 DROP INTO AFFECTED EYE(S) BY OPHTHALMIC ROUTE 2 TIMES PER DAY AT AN INTERVAL OF 6 TO 8 HOURS, Disp: , Rfl:     ondansetron ODT (ZOFRAN-ODT) 8 MG disintegrating tablet, Take 1 tablet by mouth 3 times a day., Disp: , Rfl:     Semaglutide-Weight Management (Wegovy) 0.25 MG/0.5ML solution auto-injector, Inject 0.5 mL under the skin into the appropriate area as directed 1 (One) Time Per Week. Pt has not started medication yet., Disp: , Rfl:     Cholecalciferol 100 MCG (4000 UT) capsule, Take 1 capsule every day by oral route. (Patient not taking: Reported on 10/18/2024), Disp: , Rfl:     FeroSul 325 (65 Fe) MG tablet, TAKE ONE TABLET BY MOUTH EVERY DAY WITH FOOD (Patient not taking: Reported on 10/18/2024), Disp: 30 tablet, Rfl: 5    Fiber-Lax 625 MG tablet, TAKE ONE TABLET BY MOUTH TWICE DAILY with 8 ounces of water. MAY take BOTH AT ONCE AND increase UP TO 4 tabs DAILY AS tolerated., Disp: , Rfl:     guaiFENesin (Mucinex) 600 MG 12 hr tablet, Take 1 tablet by mouth 2 (Two) Times a Day., Disp: 20 tablet, Rfl: 0    ondansetron ODT (ZOFRAN-ODT) 4 MG disintegrating tablet, Place 1 tablet on the tongue Every 8 (Eight) Hours As Needed for Nausea or Vomiting. (Patient not taking: Reported on 8/8/2024), Disp: 20 tablet, Rfl: 0    Prucalopride Succinate (Motegrity) 2 MG tablet, Take 1 tablet by mouth Daily. (Patient not taking: Reported on 10/18/2024), Disp: , Rfl:     Current Facility-Administered Medications:     denosumab (PROLIA) syringe 60 mg, 60 mg, Subcutaneous, Q6 Months, Rosalinda Irizarry MD, 60 mg at  "05/31/24 1447    Allergies:   Allergies   Allergen Reactions    Penicillins Hives    Nsaids Nausea And Vomiting       I have reviewed and updated the patient's chief complaint, history of present illness, review of systems, past medical history, surgical history, family history, social history, medications and allergy list as appropriate.     Objective    Vital Signs:   Vitals:    10/18/24 1123   BP: 122/78   BP Location: Left arm   Patient Position: Sitting   Cuff Size: Adult   Pulse: 73   Temp: 97.3 °F (36.3 °C)   Weight: 52.2 kg (115 lb)   Height: 149.9 cm (59\")   PainSc:   2   PainLoc: Hip       Body mass index is 23.23 kg/m².    Physical Exam:  GENERAL:  The patient is well-developed and well nourished.  Cooperative and oriented x3.  Affect is normal.  Hydration appears normal.  HEENT:  Normocephalic and atraumatic.  No notable alopecia.  No facial rash.  Lids and conjunctiva are normal.  Pupils are equal and sclerae are clear.  I see no oral or nasal ulcers.  Lips, teeth, and gums are within normal limits.  Oropharynx is clear.  NECK:  Neck is supple without adenopathy, masses, or thyromegaly.  CARDIOVASCULAR:  Normal S1, S2.  No murmurs are heard.  LUNGS:  Clear to auscultation   ABDOMEN:  Soft and nontender without HSM.   EXTREMITIES:  No edema.  No cyanosis or clubbing.  SKIN:  Palpation and inspection are normal.  I see no rashes, nodules, or nail pits.  NEUROLOGIC:  Gait is normal.   MUSCULOSKELETAL:  Complete joint exam was performed.  There was full range of motion of the shoulders, elbows, wrists, and hands without notable deformities, soft tissue swelling, synovitis, or atrophy except as noted.  Heberden's and Emmanuel's nodes are present.  Hips have good flexion and internal and external rotation.  Knees have no palpable effusions. Crepitus in R knee.  There is full flexion and full extension of the knees without pain.  Ankles have no soft tissue swelling or synovitis or major deformities.   BACK:  " Straight without scoliosis.    Joint Exam 10/18/2024     The following joints were examined and normal:   Left Glenohumeral, Right Glenohumeral, Left Elbow, Right Elbow, Left Wrist, Right Wrist, Left MCP 1, Right MCP 1, Left MCP 2, Right MCP 2, Left MCP 3, Right MCP 3, Left MCP 4, Right MCP 4, Left MCP 5, Right MCP 5, Left IP (thumb), Right IP (thumb), Left PIP 2 (finger), Right PIP 2 (finger), Left PIP 3 (finger), Right PIP 3 (finger), Left PIP 4 (finger), Right PIP 4 (finger), Left PIP 5 (finger), Right PIP 5 (finger), Left Knee, Right Knee       Patient Global: 20 / 100  Provider Global: 0 / 100      Assessment / Plan      Assessment & Plan  Rheumatoid arthritis involving multiple sites with positive rheumatoid factor  Onset early 2021. Rheumatoid factor and CCP positive.  BRITTA positive.  Pain and swelling in MCP joints and MTP joints.    RA continues to do well. No active swelling.   Tender joint count is  0, swollen joint count is  0, physician global is 1 , visual analog pain scale is 2, pt global is 2.  CDAI is 3-low disease activity   New lab order provided to continue labs every 6 weeks  Continue MTX at 20 mg weekly.   Take folic acid 3 q day for oral ulcers. .   Recheck in 3 months  High risk medication use  Methotrexate  Well-tolerated.  Primary osteoarthritis of both hands  She has Heberden's and Emmanuel's nodes.  Stable  Age-related osteoporosis without current pathological fracture  Failed Fosamax and IV bisphosphonate.  Currently on Prolia per PCP.    She gets her DEXA scans with her PCP as well.  Positive BRITTA (antinuclear antibody)  1: 640 homogenous.  Subtypes all -10/21.  No evidence of BRITTA associated disease.  Gastroesophageal reflux disease, unspecified whether esophagitis present  Tolerating Celebrex.  Bilateral carpal tunnel syndrome  Status post bilateral release with good results.  Arthralgia of right knee  History of ACL and MCL tears with instability and chronic pain.  MCL was repaired but  she is still having pain and instability.    Orders Placed This Encounter   Procedures    CBC Auto Differential    Comprehensive Metabolic Panel    C-reactive Protein    Sedimentation Rate       New Medications Ordered This Visit   Medications    folic acid (FOLVITE) 1 MG tablet     Sig: Take 3 tablets by mouth Daily.     Dispense:  90 tablet     Refill:  3    methotrexate 2.5 MG tablet     Sig: Take 8 tablets by mouth 1 (One) Time Per Week.     Dispense:  96 tablet     Refill:  0            Follow Up:   Return in about 3 months (around 1/18/2025).        Tony Faria MD  [facility]

## 2024-10-18 ENCOUNTER — OFFICE VISIT (OUTPATIENT)
Age: 69
End: 2024-10-18
Payer: MEDICARE

## 2024-10-18 VITALS
WEIGHT: 115 LBS | DIASTOLIC BLOOD PRESSURE: 78 MMHG | HEIGHT: 59 IN | HEART RATE: 73 BPM | BODY MASS INDEX: 23.18 KG/M2 | SYSTOLIC BLOOD PRESSURE: 122 MMHG | TEMPERATURE: 97.3 F

## 2024-10-18 DIAGNOSIS — M81.0 AGE-RELATED OSTEOPOROSIS WITHOUT CURRENT PATHOLOGICAL FRACTURE: ICD-10-CM

## 2024-10-18 DIAGNOSIS — K21.9 GASTROESOPHAGEAL REFLUX DISEASE, UNSPECIFIED WHETHER ESOPHAGITIS PRESENT: ICD-10-CM

## 2024-10-18 DIAGNOSIS — M19.042 PRIMARY OSTEOARTHRITIS OF BOTH HANDS: ICD-10-CM

## 2024-10-18 DIAGNOSIS — M19.041 PRIMARY OSTEOARTHRITIS OF BOTH HANDS: ICD-10-CM

## 2024-10-18 DIAGNOSIS — M25.561 ARTHRALGIA OF RIGHT KNEE: ICD-10-CM

## 2024-10-18 DIAGNOSIS — G56.03 BILATERAL CARPAL TUNNEL SYNDROME: ICD-10-CM

## 2024-10-18 DIAGNOSIS — M05.79 RHEUMATOID ARTHRITIS INVOLVING MULTIPLE SITES WITH POSITIVE RHEUMATOID FACTOR: Primary | ICD-10-CM

## 2024-10-18 DIAGNOSIS — R76.8 POSITIVE ANA (ANTINUCLEAR ANTIBODY): ICD-10-CM

## 2024-10-18 DIAGNOSIS — Z79.899 HIGH RISK MEDICATION USE: ICD-10-CM

## 2024-10-18 RX ORDER — METHOTREXATE 2.5 MG/1
20 TABLET ORAL WEEKLY
Qty: 96 TABLET | Refills: 0 | Status: SHIPPED | OUTPATIENT
Start: 2024-10-18

## 2024-10-18 RX ORDER — FOLIC ACID 1 MG/1
3 TABLET ORAL DAILY
Qty: 90 TABLET | Refills: 3 | Status: SHIPPED | OUTPATIENT
Start: 2024-10-18

## 2024-10-18 RX ORDER — LINACLOTIDE 145 UG/1
145 CAPSULE, GELATIN COATED ORAL EVERY MORNING
COMMUNITY
Start: 2024-09-20

## 2024-11-18 ENCOUNTER — TELEPHONE (OUTPATIENT)
Dept: FAMILY MEDICINE CLINIC | Age: 69
End: 2024-11-18
Payer: MEDICARE

## 2024-11-18 NOTE — TELEPHONE ENCOUNTER
Pt due for Prolia 12/1/24    Last Dexa -3.0  DEXA Bone Density Axial (06/06/2024 11:13)     Last CMP- Calcium 9.2  Comprehensive Metabolic Panel (10/15/2024 13:42)     Last office Visit   Office Visit with Serjio Simmons APRN (06/17/2024)   Office Visit with Rosalinda Irizarry MD (05/31/2024)     Ok to proceed?

## 2024-11-21 DIAGNOSIS — J30.1 SEASONAL ALLERGIC RHINITIS DUE TO POLLEN: ICD-10-CM

## 2024-11-21 RX ORDER — MONTELUKAST SODIUM 10 MG/1
TABLET ORAL
Qty: 90 TABLET | Refills: 3 | Status: SHIPPED | OUTPATIENT
Start: 2024-11-21

## 2024-11-27 ENCOUNTER — OFFICE VISIT (OUTPATIENT)
Dept: FAMILY MEDICINE CLINIC | Age: 69
End: 2024-11-27
Payer: MEDICARE

## 2024-11-27 VITALS
DIASTOLIC BLOOD PRESSURE: 60 MMHG | OXYGEN SATURATION: 99 % | HEIGHT: 59 IN | TEMPERATURE: 98.2 F | WEIGHT: 114.4 LBS | SYSTOLIC BLOOD PRESSURE: 120 MMHG | BODY MASS INDEX: 23.06 KG/M2 | HEART RATE: 68 BPM

## 2024-11-27 DIAGNOSIS — F41.1 GENERALIZED ANXIETY DISORDER: Chronic | ICD-10-CM

## 2024-11-27 DIAGNOSIS — M81.0 AGE-RELATED OSTEOPOROSIS WITHOUT CURRENT PATHOLOGICAL FRACTURE: ICD-10-CM

## 2024-11-27 DIAGNOSIS — F33.1 MODERATE EPISODE OF RECURRENT MAJOR DEPRESSIVE DISORDER: ICD-10-CM

## 2024-11-27 DIAGNOSIS — M05.742 RHEUMATOID ARTHRITIS INVOLVING BOTH HANDS WITH POSITIVE RHEUMATOID FACTOR: ICD-10-CM

## 2024-11-27 DIAGNOSIS — Z00.00 ANNUAL PHYSICAL EXAM: Primary | ICD-10-CM

## 2024-11-27 DIAGNOSIS — K21.9 GASTROESOPHAGEAL REFLUX DISEASE WITHOUT ESOPHAGITIS: ICD-10-CM

## 2024-11-27 DIAGNOSIS — M05.741 RHEUMATOID ARTHRITIS INVOLVING BOTH HANDS WITH POSITIVE RHEUMATOID FACTOR: ICD-10-CM

## 2024-11-27 DIAGNOSIS — Z23 ENCOUNTER FOR IMMUNIZATION: ICD-10-CM

## 2024-11-27 DIAGNOSIS — I10 ESSENTIAL HYPERTENSION: ICD-10-CM

## 2024-11-27 PROBLEM — Z79.899 HIGH RISK MEDICATION USE: Status: RESOLVED | Noted: 2024-07-01 | Resolved: 2024-11-27

## 2024-11-27 PROBLEM — N81.10 VAGINAL PROLAPSE: Status: RESOLVED | Noted: 2023-10-27 | Resolved: 2024-11-27

## 2024-11-27 PROCEDURE — G0439 PPPS, SUBSEQ VISIT: HCPCS | Performed by: FAMILY MEDICINE

## 2024-11-27 PROCEDURE — 3074F SYST BP LT 130 MM HG: CPT | Performed by: FAMILY MEDICINE

## 2024-11-27 PROCEDURE — 1159F MED LIST DOCD IN RCRD: CPT | Performed by: FAMILY MEDICINE

## 2024-11-27 PROCEDURE — 90662 IIV NO PRSV INCREASED AG IM: CPT | Performed by: FAMILY MEDICINE

## 2024-11-27 PROCEDURE — 3078F DIAST BP <80 MM HG: CPT | Performed by: FAMILY MEDICINE

## 2024-11-27 PROCEDURE — 1160F RVW MEDS BY RX/DR IN RCRD: CPT | Performed by: FAMILY MEDICINE

## 2024-11-27 PROCEDURE — 99214 OFFICE O/P EST MOD 30 MIN: CPT | Performed by: FAMILY MEDICINE

## 2024-11-27 PROCEDURE — 1126F AMNT PAIN NOTED NONE PRSNT: CPT | Performed by: FAMILY MEDICINE

## 2024-11-27 PROCEDURE — G0008 ADMIN INFLUENZA VIRUS VAC: HCPCS | Performed by: FAMILY MEDICINE

## 2024-11-27 PROCEDURE — 1170F FXNL STATUS ASSESSED: CPT | Performed by: FAMILY MEDICINE

## 2024-11-27 PROCEDURE — 96372 THER/PROPH/DIAG INJ SC/IM: CPT | Performed by: FAMILY MEDICINE

## 2024-11-27 RX ORDER — ATENOLOL 25 MG/1
50 TABLET ORAL DAILY
Qty: 180 TABLET | Refills: 1 | Status: SHIPPED | OUTPATIENT
Start: 2024-11-27

## 2024-11-27 RX ORDER — AMLODIPINE BESYLATE 2.5 MG/1
2.5 TABLET ORAL DAILY
Qty: 90 TABLET | Refills: 1 | Status: SHIPPED | OUTPATIENT
Start: 2024-11-27

## 2024-11-27 RX ORDER — DULOXETIN HYDROCHLORIDE 60 MG/1
60 CAPSULE, DELAYED RELEASE ORAL 2 TIMES DAILY
Qty: 180 CAPSULE | Refills: 1 | Status: SHIPPED | OUTPATIENT
Start: 2024-11-27

## 2024-11-27 RX ORDER — BUSPIRONE HYDROCHLORIDE 10 MG/1
10 TABLET ORAL 3 TIMES DAILY
Qty: 90 TABLET | Refills: 5 | Status: SHIPPED | OUTPATIENT
Start: 2024-11-27

## 2024-11-27 NOTE — ASSESSMENT & PLAN NOTE
Stable on buspirone 10 mg TID and duloxetine 60 mg twice daily.  Refills were needed today.  Continue to monitor.  Orders:    busPIRone (BUSPAR) 10 MG tablet; Take 1 tablet by mouth 3 (Three) Times a Day.    DULoxetine (CYMBALTA) 60 MG capsule; Take 1 capsule by mouth 2 (Two) Times a Day.

## 2024-11-27 NOTE — PROGRESS NOTES
Subjective   The ABCs of the Annual Wellness Visit  Medicare Wellness Visit      Jaqueline Katz is a 69 y.o. patient who presents for a Medicare Wellness Visit.    She is UTD on colonoscopy, last done at [facility] 2/2024 and this showed diverticulosis.  Ten year repeat recommended.  Pap smear is no longer indicated by age and history.  She is UTD on mammogram, last done 1/2024 at [facility] and that was normal.  She is UTD on DEXA, last done 2/2024 and this showed osteoporosis.  She is on Prolia.  She is up-to-date on Pneumovax (1/2021), Awmfypk19 (1/2020), Shingrix (1/2023, 4/2023), Tdap (1/2023).  She is due for COVID and flu.  She is due for routine labs including lipids.        The following portions of the patient's history were reviewed and   updated as appropriate: allergies, current medications, past family history, past medical history, past social history, past surgical history, and problem list.    Compared to one year ago, the patient's physical   health is the same.  Compared to one year ago, the patient's mental   health is the same.    Recent Hospitalizations:  She was not admitted to the hospital during the last year.     Current Medical Providers:  Patient Care Team:  Rosalinda Irizarry MD as PCP - General (Family Medicine)  Zoie Winter APRN as Nurse Practitioner (Behavioral Health)  Tony Faria MD as Consulting Physician (Rheumatology)    Outpatient Medications Prior to Visit   Medication Sig Dispense Refill   • celecoxib (CeleBREX) 200 MG capsule TAKE ONE CAPSULE BY MOUTH TWICE DAILY WITH FOOD AS NEEDED 90 capsule 1   • cetirizine (zyrTEC) 10 MG tablet Take 1 tablet every day by oral route.     • denosumab (Prolia) 60 MG/ML solution prefilled syringe syringe 1 mL Every 6 (Six) Months.     • esomeprazole (nexIUM) 40 MG capsule TAKE ONE CAPSULE BY MOUTH EVERY DAY 90 capsule 1   • estradiol (ESTRACE) 0.1 MG/GM vaginal cream Insert 1 g into the vagina 3 (Three) Times a Week.      • folic acid (FOLVITE) 1 MG tablet Take 3 tablets by mouth Daily. 90 tablet 3   • Linzess 145 MCG capsule capsule Take 1 capsule by mouth Every Morning.     • losartan (COZAAR) 100 MG tablet TAKE ONE TABLET BY MOUTH EVERY DAY FOR BLOOD PRESSURE 90 tablet 1   • methotrexate 2.5 MG tablet Take 8 tablets by mouth 1 (One) Time Per Week. 96 tablet 0   • montelukast (SINGULAIR) 10 MG tablet TAKE ONE TABLET BY MOUTH EVERY EVENING 90 tablet 3   • olopatadine (PATANOL) 0.1 % ophthalmic solution Patanol 0.1 % eye drops   INSTILL 1 DROP INTO AFFECTED EYE(S) BY OPHTHALMIC ROUTE 2 TIMES PER DAY AT AN INTERVAL OF 6 TO 8 HOURS     • ondansetron ODT (ZOFRAN-ODT) 8 MG disintegrating tablet Take 1 tablet by mouth 3 times a day.     • Semaglutide-Weight Management (Wegovy) 0.25 MG/0.5ML solution auto-injector Inject 0.5 mL under the skin into the appropriate area as directed 1 (One) Time Per Week. Pt has not started medication yet.     • amLODIPine (NORVASC) 2.5 MG tablet TAKE ONE TABLET BY MOUTH EVERY DAY FOR BLOOD PRESSURE 90 tablet 1   • atenolol (TENORMIN) 25 MG tablet Take 2 tablets by mouth Daily. 180 tablet 1   • busPIRone (BUSPAR) 10 MG tablet TAKE ONE TABLET BY MOUTH THREE TIMES DAILY 90 tablet 5   • DULoxetine (CYMBALTA) 60 MG capsule Take 1 capsule by mouth 2 (Two) Times a Day. 180 capsule 1   • Cholecalciferol 100 MCG (4000 UT) capsule Take 1 capsule every day by oral route. (Patient not taking: Reported on 11/27/2024)     • FeroSul 325 (65 Fe) MG tablet TAKE ONE TABLET BY MOUTH EVERY DAY WITH FOOD (Patient not taking: Reported on 11/27/2024) 30 tablet 5   • Fiber-Lax 625 MG tablet TAKE ONE TABLET BY MOUTH TWICE DAILY with 8 ounces of water. MAY take BOTH AT ONCE AND increase UP TO 4 tabs DAILY AS tolerated.     • guaiFENesin (Mucinex) 600 MG 12 hr tablet Take 1 tablet by mouth 2 (Two) Times a Day. 20 tablet 0   • ondansetron ODT (ZOFRAN-ODT) 4 MG disintegrating tablet Place 1 tablet on the tongue Every 8 (Eight) Hours  As Needed for Nausea or Vomiting. (Patient not taking: Reported on 8/8/2024) 20 tablet 0   • Prucalopride Succinate (Motegrity) 2 MG tablet Take 1 tablet by mouth Daily. (Patient not taking: Reported on 11/27/2024)       Facility-Administered Medications Prior to Visit   Medication Dose Route Frequency Provider Last Rate Last Admin   • denosumab (PROLIA) syringe 60 mg  60 mg Subcutaneous Q6 Months Rosalinda Irizarry MD   60 mg at 05/31/24 1447     No opioid medication identified on active medication list. I have reviewed chart for other potential  high risk medication/s and harmful drug interactions in the elderly.      Aspirin is not on active medication list.  Aspirin use is not indicated based on review of current medical condition/s. Risk of harm outweighs potential benefits.  .    Patient Active Problem List   Diagnosis   • Essential hypertension   • Gastroesophageal reflux disease without esophagitis   • Primary osteoarthritis involving multiple joints   • Moderate episode of recurrent major depressive disorder   • Generalized anxiety disorder   • Other irritable bowel syndrome   • Age-related osteoporosis without current pathological fracture   • Rheumatoid arthritis involving both hands with positive rheumatoid factor   • Seasonal allergic rhinitis due to pollen   • Right hip pain   • Rheumatoid arthritis involving multiple sites with positive rheumatoid factor   • Primary osteoarthritis of both hands   • Positive BRITTA (antinuclear antibody)   • Bilateral carpal tunnel syndrome   • Arthralgia of right knee     Advance Care Planning Advance Directive is not on file.  ACP discussion was held with the patient during this visit. Patient has an advance directive (not in EMR), copy requested.            Objective   Vitals:    11/27/24 1024   BP: 120/60   BP Location: Right arm   Patient Position: Sitting   Cuff Size: Adult   Pulse: 68   Temp: 98.2 °F (36.8 °C)   TempSrc: Oral   SpO2: 99%   Weight: 51.9 kg  "(114 lb 6.4 oz)   Height: 149.9 cm (59.02\")   PainSc: 0-No pain       Estimated body mass index is 23.09 kg/m² as calculated from the following:    Height as of this encounter: 149.9 cm (59.02\").    Weight as of this encounter: 51.9 kg (114 lb 6.4 oz).    BMI is within normal parameters. No other follow-up for BMI required.       Does the patient have evidence of cognitive impairment? No                                                                                                Health  Risk Assessment    Smoking Status:  Social History     Tobacco Use   Smoking Status Former   • Current packs/day: 0.00   • Average packs/day: 1.5 packs/day for 25.0 years (37.5 ttl pk-yrs)   • Types: Cigarettes   • Start date: 1985   • Quit date: 2010   • Years since quittin.9   • Passive exposure: Past   Smokeless Tobacco Never     Alcohol Consumption:  Social History     Substance and Sexual Activity   Alcohol Use Yes   • Alcohol/week: 1.0 standard drink of alcohol   • Types: 1 Glasses of wine per week    Comment: Wine maybe twice a week at dinner       Fall Risk Screen  STEADI Fall Risk Assessment was completed, and patient is at LOW risk for falls.Assessment completed on:2024    Depression Screening   Little interest or pleasure in doing things? Not at all   Feeling down, depressed, or hopeless? Not at all   PHQ-2 Total Score 0      Health Habits and Functional and Cognitive Screenin/27/2024    10:29 AM   Functional & Cognitive Status   Do you have difficulty preparing food and eating? No   Do you have difficulty using the toilet? No   Do you have difficulty moving around from place to place? No   Do you have trouble with steps or getting out of a bed or a chair? No   Current Diet Well Balanced Diet   Dental Exam Up to date   Eye Exam Up to date   Exercise (times per week) 3 times per week   Current Exercises Include Walking   Do you need help using the phone?  No   Are you deaf or do you have " serious difficulty hearing?  No   Do you need help to go to places out of walking distance? No   Do you need help shopping? No   Do you need help preparing meals?  No   Do you need help with housework?  No   Do you need help with laundry? No   Do you need help taking your medications? No   Do you need help managing money? No   Do you ever drive or ride in a car without wearing a seat belt? No   Have you felt unusual stress, anger or loneliness in the last month? No   Who do you live with? Spouse   If you need help, do you have trouble finding someone available to you? No   Have you been bothered in the last four weeks by sexual problems? No   Do you have difficulty concentrating, remembering or making decisions? No           Age-appropriate Screening Schedule:  Refer to the list below for future screening recommendations based on patient's age, sex and/or medical conditions. Orders for these recommended tests are listed in the plan section. The patient has been provided with a written plan.    Health Maintenance List  Health Maintenance   Topic Date Due   • INFLUENZA VACCINE  07/01/2024   • COVID-19 Vaccine (5 - 2024-25 season) 11/29/2024 (Originally 9/1/2024)   • LUNG CANCER SCREENING  05/10/2025   • ANNUAL WELLNESS VISIT  11/27/2025   • MAMMOGRAM  01/17/2026   • DXA SCAN  06/06/2026   • TDAP/TD VACCINES (3 - Td or Tdap) 01/18/2033   • COLORECTAL CANCER SCREENING  02/07/2034   • HEPATITIS C SCREENING  Completed   • Pneumococcal Vaccine 65+  Completed   • ZOSTER VACCINE  Completed                                                                                                                                                CMS Preventative Services Quick Reference  Risk Factors Identified During Encounter  Immunizations Discussed/Encouraged: Influenza and COVID19    The above risks/problems have been discussed with the patient.  Pertinent information has been shared with the patient in the After Visit Summary.  An  "After Visit Summary and PPPS were made available to the patient.    Follow Up:   Next Medicare Wellness visit to be scheduled in 1 year.         Additional E&M Note during same encounter follows:  Patient has additional, significant, and separately identifiable condition(s)/problem(s) that require work above and beyond the Medicare Wellness Visit     Chief Complaint  Medicare Wellness-subsequent    Subjective   HPI  Destiny is also being seen today for additional medical problem/s.    She had vaginal and rectal prolapse repair with Urogyn back over the summer.  She had follow-up there about a week ago and was told that the rectum has come loose and the bladder is dropping.  They are discussing going back for a revision with a sling.       She is on losartan, amlodipine and atenolol for HTN.  BP has been well controlled.  No chest pain, palpitations or shortness of breath.      She is on duloxetine and buspirone for anxiety and depression.  Mood has been \"pretty good.\"  She has been seen by [facility] in the past but is no longer seeing her as she has been quite stable on her current regimen.  No anxiety or panic attacks, no depressed mood or anhedonia.      She is on methotrexate for RA.  Follows with Dr. Bienvenido Cordero in [place].     She is on esomeprazole for GERD.  No heartburn or indigestion.  S/p EGD with Dr. Allred [facility] at [facility] that reportedly showed gastritis.  She is now on Linzess for constipation as the Motegrity was outlandishly expensive.       She is on Prolia for osteoporosis.  She is up-to-date on DEXA, last done 2/2024 and this showed osteoporosis.    Review of Systems   Constitutional:  Negative for chills, fatigue and fever.   HENT:  Negative for congestion, hearing loss and rhinorrhea.    Eyes:  Negative for pain and visual disturbance.   Respiratory:  Negative for cough and shortness of breath.    Cardiovascular:  Negative for chest pain and palpitations.   Gastrointestinal:  " "Negative for abdominal pain, constipation, diarrhea, nausea and vomiting.   Genitourinary:  Negative for difficulty urinating and dysuria.   Musculoskeletal:  Positive for arthralgias. Negative for myalgias.   Neurological:  Negative for weakness and numbness.   Psychiatric/Behavioral:  Negative for dysphoric mood and sleep disturbance. The patient is not nervous/anxious.               Objective   Vital Signs:  /60 (BP Location: Right arm, Patient Position: Sitting, Cuff Size: Adult)   Pulse 68   Temp 98.2 °F (36.8 °C) (Oral)   Ht 149.9 cm (59.02\")   Wt 51.9 kg (114 lb 6.4 oz)   SpO2 99%   BMI 23.09 kg/m²   Physical Exam  Vitals reviewed.   Constitutional:       General: She is not in acute distress.     Appearance: Normal appearance. She is well-developed.   HENT:      Head: Normocephalic and atraumatic.      Right Ear: External ear normal.      Left Ear: External ear normal.      Mouth/Throat:      Mouth: Mucous membranes are moist.   Eyes:      Extraocular Movements: Extraocular movements intact.      Conjunctiva/sclera: Conjunctivae normal.      Pupils: Pupils are equal, round, and reactive to light.   Cardiovascular:      Rate and Rhythm: Normal rate and regular rhythm.      Heart sounds: No murmur heard.  Pulmonary:      Effort: Pulmonary effort is normal.      Breath sounds: Normal breath sounds. No wheezing, rhonchi or rales.   Abdominal:      General: Bowel sounds are normal. There is no distension.      Palpations: Abdomen is soft.      Tenderness: There is no abdominal tenderness.   Musculoskeletal:         General: Normal range of motion.   Skin:     General: Skin is warm and dry.   Neurological:      Mental Status: She is alert and oriented to person, place, and time.      Deep Tendon Reflexes: Reflexes normal.   Psychiatric:         Mood and Affect: Mood and affect normal.         Behavior: Behavior normal.         Thought Content: Thought content normal.         Judgment: Judgment normal. "     Lab Results   Component Value Date    GLUCOSE 92 10/15/2024    BUN 16 10/15/2024    CREATININE 0.94 10/15/2024    EGFR 65.4 10/15/2024    BCR 17.0 10/15/2024    K 4.2 10/15/2024    CO2 24.0 10/15/2024    CALCIUM 9.2 10/15/2024    ALBUMIN 4.2 10/15/2024    BILITOT 0.2 10/15/2024    AST 17 10/15/2024    ALT 14 10/15/2024       Lab Results   Component Value Date    CHOL 163 10/27/2023    CHLPL 215 (H) 03/24/2021    TRIG 79 10/27/2023    HDL 50 10/27/2023    LDL 98 10/27/2023       Lab Results   Component Value Date    WBC 7.43 10/15/2024    HGB 12.9 10/15/2024    HCT 40.2 10/15/2024    MCV 92.4 10/15/2024     10/15/2024                     Assessment and Plan            Annual physical exam  She is UTD on colonoscopy, last done at [facility] 2/2024 and this showed diverticulosis.  Ten year repeat recommended.  Pap smear is no longer indicated by age and history.  She is UTD on mammogram, last done 1/2024 at [facility] and that was normal.  She is UTD on DEXA, last done 2/2024 and this showed osteoporosis.  She is on Prolia.  She is up-to-date on Pneumovax (1/2021), Nvsvnjp43 (1/2020), Shingrix (1/2023, 4/2023), Tdap (1/2023).  She is due for COVID and flu.  High dose flu given today.  COVID declined.  She is due for routine labs including lipids; ordered.        Essential hypertension    Blood pressure has been running at goal.  Continue amlodipine 2.5 mg daily and losartan 100 mg daily.  Refills were needed today.  Labs were needed today.  Continue to monitor.  Orders:  •  amLODIPine (NORVASC) 2.5 MG tablet; Take 1 tablet by mouth Daily. for blood pressure  •  atenolol (TENORMIN) 25 MG tablet; Take 2 tablets by mouth Daily.  •  Lipid Panel; Future    Generalized anxiety disorder    Stable on buspirone 10 mg TID and duloxetine 60 mg twice daily.  Refills were needed today.  Continue to monitor.  Orders:  •  busPIRone (BUSPAR) 10 MG tablet; Take 1 tablet by mouth 3 (Three) Times a Day.  •  DULoxetine  (CYMBALTA) 60 MG capsule; Take 1 capsule by mouth 2 (Two) Times a Day.    Moderate episode of recurrent major depressive disorder    As per anxiety plan.   Orders:  •  DULoxetine (CYMBALTA) 60 MG capsule; Take 1 capsule by mouth 2 (Two) Times a Day.    Rheumatoid arthritis involving both hands with positive rheumatoid factor  Following with Dr. Faria [facility] in [place].  Stable on methotrexate.       Gastroesophageal reflux disease without esophagitis  Stable on esomeprazole 40 mg daily.  Refills were not needed today.  Continue to monitor.  Wean PPI as able.       Age-related osteoporosis without current pathological fracture  Stable on Prolia.  DEXA is up-to-date, last done 2/2024 showing osteoporosis.       Encounter for immunization    Orders:  •  Fluzone High-Dose 65+yrs            Follow Up   Return in about 6 months (around 5/27/2025) for Recheck.  Patient was given instructions and counseling regarding her condition or for health maintenance advice. Please see specific information pulled into the AVS if appropriate.

## 2024-11-27 NOTE — ASSESSMENT & PLAN NOTE
As per anxiety plan.   Orders:    DULoxetine (CYMBALTA) 60 MG capsule; Take 1 capsule by mouth 2 (Two) Times a Day.

## 2024-11-27 NOTE — ASSESSMENT & PLAN NOTE
Blood pressure has been running at goal.  Continue amlodipine 2.5 mg daily and losartan 100 mg daily.  Refills were needed today.  Labs were needed today.  Continue to monitor.  Orders:    amLODIPine (NORVASC) 2.5 MG tablet; Take 1 tablet by mouth Daily. for blood pressure    atenolol (TENORMIN) 25 MG tablet; Take 2 tablets by mouth Daily.    Lipid Panel; Future

## 2024-12-13 ENCOUNTER — LAB (OUTPATIENT)
Dept: LAB | Facility: HOSPITAL | Age: 69
End: 2024-12-13
Payer: MEDICARE

## 2024-12-13 DIAGNOSIS — Z79.899 HIGH RISK MEDICATION USE: ICD-10-CM

## 2024-12-13 DIAGNOSIS — M05.79 RHEUMATOID ARTHRITIS INVOLVING MULTIPLE SITES WITH POSITIVE RHEUMATOID FACTOR: ICD-10-CM

## 2024-12-13 DIAGNOSIS — I10 ESSENTIAL HYPERTENSION: ICD-10-CM

## 2024-12-13 LAB
ALBUMIN SERPL-MCNC: 4.3 G/DL (ref 3.5–5.2)
ALBUMIN/GLOB SERPL: 1.5 G/DL
ALP SERPL-CCNC: 57 U/L (ref 39–117)
ALT SERPL W P-5'-P-CCNC: 14 U/L (ref 1–33)
ANION GAP SERPL CALCULATED.3IONS-SCNC: 10 MMOL/L (ref 5–15)
AST SERPL-CCNC: 20 U/L (ref 1–32)
BASOPHILS # BLD AUTO: 0.07 10*3/MM3 (ref 0–0.2)
BASOPHILS NFR BLD AUTO: 0.9 % (ref 0–1.5)
BILIRUB SERPL-MCNC: 0.3 MG/DL (ref 0–1.2)
BUN SERPL-MCNC: 16 MG/DL (ref 8–23)
BUN/CREAT SERPL: 17.6 (ref 7–25)
CALCIUM SPEC-SCNC: 9.4 MG/DL (ref 8.6–10.5)
CHLORIDE SERPL-SCNC: 103 MMOL/L (ref 98–107)
CHOLEST SERPL-MCNC: 177 MG/DL (ref 0–200)
CO2 SERPL-SCNC: 24 MMOL/L (ref 22–29)
CREAT SERPL-MCNC: 0.91 MG/DL (ref 0.57–1)
CRP SERPL-MCNC: <0.3 MG/DL (ref 0–0.5)
DEPRECATED RDW RBC AUTO: 48.9 FL (ref 37–54)
EGFRCR SERPLBLD CKD-EPI 2021: 68 ML/MIN/1.73
EOSINOPHIL # BLD AUTO: 0.06 10*3/MM3 (ref 0–0.4)
EOSINOPHIL NFR BLD AUTO: 0.8 % (ref 0.3–6.2)
ERYTHROCYTE [DISTWIDTH] IN BLOOD BY AUTOMATED COUNT: 14.7 % (ref 12.3–15.4)
ERYTHROCYTE [SEDIMENTATION RATE] IN BLOOD: 3 MM/HR (ref 0–30)
GLOBULIN UR ELPH-MCNC: 2.9 GM/DL
GLUCOSE SERPL-MCNC: 90 MG/DL (ref 65–99)
HCT VFR BLD AUTO: 41.1 % (ref 34–46.6)
HDLC SERPL-MCNC: 52 MG/DL (ref 40–60)
HGB BLD-MCNC: 13.1 G/DL (ref 12–15.9)
IMM GRANULOCYTES # BLD AUTO: 0.03 10*3/MM3 (ref 0–0.05)
IMM GRANULOCYTES NFR BLD AUTO: 0.4 % (ref 0–0.5)
LDLC SERPL CALC-MCNC: 102 MG/DL (ref 0–100)
LDLC/HDLC SERPL: 1.9 {RATIO}
LYMPHOCYTES # BLD AUTO: 1.41 10*3/MM3 (ref 0.7–3.1)
LYMPHOCYTES NFR BLD AUTO: 18.4 % (ref 19.6–45.3)
MCH RBC QN AUTO: 29 PG (ref 26.6–33)
MCHC RBC AUTO-ENTMCNC: 31.9 G/DL (ref 31.5–35.7)
MCV RBC AUTO: 91.1 FL (ref 79–97)
MONOCYTES # BLD AUTO: 0.59 10*3/MM3 (ref 0.1–0.9)
MONOCYTES NFR BLD AUTO: 7.7 % (ref 5–12)
NEUTROPHILS NFR BLD AUTO: 5.5 10*3/MM3 (ref 1.7–7)
NEUTROPHILS NFR BLD AUTO: 71.8 % (ref 42.7–76)
PLATELET # BLD AUTO: 319 10*3/MM3 (ref 140–450)
PMV BLD AUTO: 9.4 FL (ref 6–12)
POTASSIUM SERPL-SCNC: 5 MMOL/L (ref 3.5–5.2)
PROT SERPL-MCNC: 7.2 G/DL (ref 6–8.5)
RBC # BLD AUTO: 4.51 10*6/MM3 (ref 3.77–5.28)
SODIUM SERPL-SCNC: 137 MMOL/L (ref 136–145)
TRIGL SERPL-MCNC: 132 MG/DL (ref 0–150)
VLDLC SERPL-MCNC: 23 MG/DL (ref 5–40)
WBC NRBC COR # BLD AUTO: 7.66 10*3/MM3 (ref 3.4–10.8)

## 2024-12-13 PROCEDURE — 85025 COMPLETE CBC W/AUTO DIFF WBC: CPT

## 2024-12-13 PROCEDURE — 80061 LIPID PANEL: CPT

## 2024-12-13 PROCEDURE — 85652 RBC SED RATE AUTOMATED: CPT

## 2024-12-13 PROCEDURE — 86140 C-REACTIVE PROTEIN: CPT

## 2024-12-13 PROCEDURE — 80053 COMPREHEN METABOLIC PANEL: CPT

## 2024-12-13 PROCEDURE — 36415 COLL VENOUS BLD VENIPUNCTURE: CPT

## 2025-01-17 ENCOUNTER — TELEPHONE (OUTPATIENT)
Dept: FAMILY MEDICINE CLINIC | Age: 70
End: 2025-01-17
Payer: MEDICARE

## 2025-01-17 DIAGNOSIS — Z12.31 ENCOUNTER FOR SCREENING MAMMOGRAM FOR BREAST CANCER: Primary | ICD-10-CM

## 2025-01-17 NOTE — TELEPHONE ENCOUNTER
----- Message from Rosalinda Irizarry sent at 1/17/2024  3:41 PM EST -----  Regarding: f/u screening mammo  Please call pt to let her know that she is due for her yearly screening mammogram.  Please pend order for screening mammo and send to me.  Thanks, JONATHAN

## 2025-02-18 ENCOUNTER — TELEPHONE (OUTPATIENT)
Dept: FAMILY MEDICINE CLINIC | Age: 70
End: 2025-02-18
Payer: MEDICARE

## 2025-02-18 NOTE — TELEPHONE ENCOUNTER
1st attempt. Spoke to pt re overdue mammogram. Pt is currently in [place] and will return to [place] in March. Will postpone until March.

## 2025-03-06 ENCOUNTER — LAB (OUTPATIENT)
Dept: LAB | Facility: HOSPITAL | Age: 70
End: 2025-03-06
Payer: MEDICARE

## 2025-03-06 DIAGNOSIS — M05.79 RHEUMATOID ARTHRITIS INVOLVING MULTIPLE SITES WITH POSITIVE RHEUMATOID FACTOR: ICD-10-CM

## 2025-03-06 DIAGNOSIS — Z79.899 HIGH RISK MEDICATION USE: ICD-10-CM

## 2025-03-06 LAB
ALBUMIN SERPL-MCNC: 3.8 G/DL (ref 3.5–5.2)
ALBUMIN/GLOB SERPL: 1.5 G/DL
ALP SERPL-CCNC: 58 U/L (ref 39–117)
ALT SERPL W P-5'-P-CCNC: 13 U/L (ref 1–33)
ANION GAP SERPL CALCULATED.3IONS-SCNC: 9 MMOL/L (ref 5–15)
AST SERPL-CCNC: 18 U/L (ref 1–32)
BASOPHILS # BLD AUTO: 0.04 10*3/MM3 (ref 0–0.2)
BASOPHILS NFR BLD AUTO: 0.6 % (ref 0–1.5)
BILIRUB SERPL-MCNC: 0.2 MG/DL (ref 0–1.2)
BUN SERPL-MCNC: 13 MG/DL (ref 8–23)
BUN/CREAT SERPL: 16 (ref 7–25)
CALCIUM SPEC-SCNC: 8.7 MG/DL (ref 8.6–10.5)
CHLORIDE SERPL-SCNC: 106 MMOL/L (ref 98–107)
CO2 SERPL-SCNC: 25 MMOL/L (ref 22–29)
CREAT SERPL-MCNC: 0.81 MG/DL (ref 0.57–1)
CRP SERPL-MCNC: <0.3 MG/DL (ref 0–0.5)
DEPRECATED RDW RBC AUTO: 52.1 FL (ref 37–54)
EGFRCR SERPLBLD CKD-EPI 2021: 78.2 ML/MIN/1.73
EOSINOPHIL # BLD AUTO: 0.11 10*3/MM3 (ref 0–0.4)
EOSINOPHIL NFR BLD AUTO: 1.7 % (ref 0.3–6.2)
ERYTHROCYTE [DISTWIDTH] IN BLOOD BY AUTOMATED COUNT: 15.5 % (ref 12.3–15.4)
ERYTHROCYTE [SEDIMENTATION RATE] IN BLOOD: <1 MM/HR (ref 0–30)
GLOBULIN UR ELPH-MCNC: 2.5 GM/DL
GLUCOSE SERPL-MCNC: 157 MG/DL (ref 65–99)
HCT VFR BLD AUTO: 36.4 % (ref 34–46.6)
HGB BLD-MCNC: 11.5 G/DL (ref 12–15.9)
IMM GRANULOCYTES # BLD AUTO: 0.01 10*3/MM3 (ref 0–0.05)
IMM GRANULOCYTES NFR BLD AUTO: 0.2 % (ref 0–0.5)
LYMPHOCYTES # BLD AUTO: 1.37 10*3/MM3 (ref 0.7–3.1)
LYMPHOCYTES NFR BLD AUTO: 21.1 % (ref 19.6–45.3)
MCH RBC QN AUTO: 29.2 PG (ref 26.6–33)
MCHC RBC AUTO-ENTMCNC: 31.6 G/DL (ref 31.5–35.7)
MCV RBC AUTO: 92.4 FL (ref 79–97)
MONOCYTES # BLD AUTO: 0.42 10*3/MM3 (ref 0.1–0.9)
MONOCYTES NFR BLD AUTO: 6.5 % (ref 5–12)
NEUTROPHILS NFR BLD AUTO: 4.54 10*3/MM3 (ref 1.7–7)
NEUTROPHILS NFR BLD AUTO: 69.9 % (ref 42.7–76)
PLATELET # BLD AUTO: 257 10*3/MM3 (ref 140–450)
PMV BLD AUTO: 9.2 FL (ref 6–12)
POTASSIUM SERPL-SCNC: 3.8 MMOL/L (ref 3.5–5.2)
PROT SERPL-MCNC: 6.3 G/DL (ref 6–8.5)
RBC # BLD AUTO: 3.94 10*6/MM3 (ref 3.77–5.28)
SODIUM SERPL-SCNC: 140 MMOL/L (ref 136–145)
WBC NRBC COR # BLD AUTO: 6.49 10*3/MM3 (ref 3.4–10.8)

## 2025-03-06 PROCEDURE — 85652 RBC SED RATE AUTOMATED: CPT

## 2025-03-06 PROCEDURE — 85025 COMPLETE CBC W/AUTO DIFF WBC: CPT

## 2025-03-06 PROCEDURE — 36415 COLL VENOUS BLD VENIPUNCTURE: CPT

## 2025-03-06 PROCEDURE — 80053 COMPREHEN METABOLIC PANEL: CPT

## 2025-03-06 PROCEDURE — 86140 C-REACTIVE PROTEIN: CPT

## 2025-03-23 ENCOUNTER — READMISSION MANAGEMENT (OUTPATIENT)
Dept: CALL CENTER | Facility: HOSPITAL | Age: 70
End: 2025-03-23
Payer: MEDICARE

## 2025-03-23 NOTE — OUTREACH NOTE
Prep Survey      Flowsheet Row Responses   [facility] facility patient discharged from? Non-BH   Is LACE score < 7 ? Non- Discharge   Eligibility [facility]   Date of Admission 03/20/25   Date of Discharge 03/23/25   Discharge diagnosis R LAPAROSCOPY, SURG, COLPOPEXY   SC REMV FASCIA FOR GRAFT INCIS   SC REMV FASCIA FOR GRAFT STRIPPER   SACROCOLPOPEXY, ABDOMINAL, ROBOT-ASSISTED WITH AUTOLOGOUS FASCIA   APPLICATION, GRAFT, FASCIA SELENA, BY INCISION AND AREA EXPOSURE, COMPLEX   Does the patient have one of the following disease processes/diagnoses(primary or secondary)? General Surgery   Does the patient have Home health ordered? No   Is there a DME ordered? No   Prep survey completed? Yes            NICHOLAS HIDALGO - Registered Nurse

## 2025-03-24 ENCOUNTER — TRANSITIONAL CARE MANAGEMENT TELEPHONE ENCOUNTER (OUTPATIENT)
Dept: CALL CENTER | Facility: HOSPITAL | Age: 70
End: 2025-03-24
Payer: MEDICARE

## 2025-03-24 NOTE — OUTREACH NOTE
Call Center TCM Note      Flowsheet Row Responses   [facility] [place] patient discharged from? Non-  []   Does the patient have one of the following disease processes/diagnoses(primary or secondary)? Other   TCM attempt successful? Yes   Call start time 1000   Call end time 1002   Discharge diagnosis R LAPAROSCOPY, SURG, COLPOPEXY   TN REMV FASCIA FOR GRAFT INCIS   TN REMV FASCIA FOR GRAFT STRIPPER   SACROCOLPOPEXY, ABDOMINAL, ROBOT-ASSISTED WITH AUTOLOGOUS FASCIA   APPLICATION, GRAFT, FASCIA SELENA, BY INCISION AND AREA EXPOSURE, COMPLEX   Meds reviewed with patient/caregiver? Yes   Is the patient having any side effects they believe may be caused by any medication additions or changes? No   Does the patient have all medications ordered at discharge? Yes   Is the patient taking all medications as directed (includes completed medication regime)? Yes   Comments No available HOSP DC FU appts that meets TCM guidelines. TCM call complete.   Does the patient have an appointment with their PCP within 7-14 days of discharge? No appointments available   Nursing Interventions Routed TCM call to PCP office   Has home health visited the patient within 72 hours of discharge? N/A   Psychosocial issues? No   Did the patient receive a copy of their discharge instructions? Yes   Nursing interventions Reviewed instructions with patient   What is the patient's perception of their health status since discharge? Improving   Is the patient/caregiver able to teach back signs and symptoms related to disease process for when to call PCP? Yes   Is the patient/caregiver able to teach back signs and symptoms related to disease process for when to call 911? Yes   Is the patient/caregiver able to teach back the hierarchy of who to call/visit for symptoms/problems? PCP, Specialist, Home health nurse, Urgent Care, ED, 911 Yes   TCM call completed? Yes   Wrap up additional comments Pt reports she is doing ok. Reviewed s/s of infection to monitor  for at surgical site. Pt aware how to care for drain. Pt will FU with surgeon office.   Call end time 1002            Shu Armstrong RN    3/24/2025, 10:03 EDT

## 2025-04-01 RX ORDER — FOLIC ACID 1 MG/1
3000 TABLET ORAL DAILY
Qty: 90 TABLET | Refills: 3 | Status: SHIPPED | OUTPATIENT
Start: 2025-04-01

## 2025-04-07 ENCOUNTER — TELEPHONE (OUTPATIENT)
Dept: FAMILY MEDICINE CLINIC | Age: 70
End: 2025-04-07
Payer: MEDICARE

## 2025-04-07 NOTE — TELEPHONE ENCOUNTER
Pt due for Prolia 5/27/25    Last Dexa -3.0  DEXA Bone Density Axial (06/06/2024 11:13)     Last CMP- Calcium 8.5  BASIC METABOLIC PANEL (03/23/2025 00:37)     Last office Visit   Office Visit with Rosalinda Irizarry MD (11/27/2024)     Calcium low. Please advise.

## 2025-04-28 ENCOUNTER — LAB (OUTPATIENT)
Dept: LAB | Facility: HOSPITAL | Age: 70
End: 2025-04-28
Payer: MEDICARE

## 2025-04-28 DIAGNOSIS — Z79.899 HIGH RISK MEDICATION USE: ICD-10-CM

## 2025-04-28 DIAGNOSIS — M05.79 RHEUMATOID ARTHRITIS INVOLVING MULTIPLE SITES WITH POSITIVE RHEUMATOID FACTOR: ICD-10-CM

## 2025-04-28 DIAGNOSIS — M81.0 AGE-RELATED OSTEOPOROSIS WITHOUT CURRENT PATHOLOGICAL FRACTURE: ICD-10-CM

## 2025-04-28 LAB
ALBUMIN SERPL-MCNC: 4.2 G/DL (ref 3.5–5.2)
ALBUMIN/GLOB SERPL: 1.6 G/DL
ALP SERPL-CCNC: 50 U/L (ref 39–117)
ALT SERPL W P-5'-P-CCNC: 19 U/L (ref 1–33)
ANION GAP SERPL CALCULATED.3IONS-SCNC: 10 MMOL/L (ref 5–15)
AST SERPL-CCNC: 24 U/L (ref 1–32)
BASOPHILS # BLD AUTO: 0.05 10*3/MM3 (ref 0–0.2)
BASOPHILS NFR BLD AUTO: 0.7 % (ref 0–1.5)
BILIRUB SERPL-MCNC: <0.2 MG/DL (ref 0–1.2)
BUN SERPL-MCNC: 13 MG/DL (ref 8–23)
BUN/CREAT SERPL: 16.7 (ref 7–25)
CALCIUM SPEC-SCNC: 9.4 MG/DL (ref 8.6–10.5)
CHLORIDE SERPL-SCNC: 107 MMOL/L (ref 98–107)
CO2 SERPL-SCNC: 23 MMOL/L (ref 22–29)
CREAT SERPL-MCNC: 0.78 MG/DL (ref 0.57–1)
CRP SERPL-MCNC: <0.3 MG/DL (ref 0–0.5)
DEPRECATED RDW RBC AUTO: 47.6 FL (ref 37–54)
EGFRCR SERPLBLD CKD-EPI 2021: 81.8 ML/MIN/1.73
EOSINOPHIL # BLD AUTO: 0.1 10*3/MM3 (ref 0–0.4)
EOSINOPHIL NFR BLD AUTO: 1.4 % (ref 0.3–6.2)
ERYTHROCYTE [DISTWIDTH] IN BLOOD BY AUTOMATED COUNT: 14.4 % (ref 12.3–15.4)
ERYTHROCYTE [SEDIMENTATION RATE] IN BLOOD: 3 MM/HR (ref 0–30)
GLOBULIN UR ELPH-MCNC: 2.7 GM/DL
GLUCOSE SERPL-MCNC: 94 MG/DL (ref 65–99)
HCT VFR BLD AUTO: 36.8 % (ref 34–46.6)
HGB BLD-MCNC: 11.6 G/DL (ref 12–15.9)
IMM GRANULOCYTES # BLD AUTO: 0.03 10*3/MM3 (ref 0–0.05)
IMM GRANULOCYTES NFR BLD AUTO: 0.4 % (ref 0–0.5)
LYMPHOCYTES # BLD AUTO: 1.63 10*3/MM3 (ref 0.7–3.1)
LYMPHOCYTES NFR BLD AUTO: 23 % (ref 19.6–45.3)
MCH RBC QN AUTO: 28.5 PG (ref 26.6–33)
MCHC RBC AUTO-ENTMCNC: 31.5 G/DL (ref 31.5–35.7)
MCV RBC AUTO: 90.4 FL (ref 79–97)
MONOCYTES # BLD AUTO: 0.54 10*3/MM3 (ref 0.1–0.9)
MONOCYTES NFR BLD AUTO: 7.6 % (ref 5–12)
NEUTROPHILS NFR BLD AUTO: 4.75 10*3/MM3 (ref 1.7–7)
NEUTROPHILS NFR BLD AUTO: 66.9 % (ref 42.7–76)
PLATELET # BLD AUTO: 297 10*3/MM3 (ref 140–450)
PMV BLD AUTO: 9.2 FL (ref 6–12)
POTASSIUM SERPL-SCNC: 3.7 MMOL/L (ref 3.5–5.2)
PROT SERPL-MCNC: 6.9 G/DL (ref 6–8.5)
RBC # BLD AUTO: 4.07 10*6/MM3 (ref 3.77–5.28)
SODIUM SERPL-SCNC: 140 MMOL/L (ref 136–145)
WBC NRBC COR # BLD AUTO: 7.1 10*3/MM3 (ref 3.4–10.8)

## 2025-04-28 PROCEDURE — 36415 COLL VENOUS BLD VENIPUNCTURE: CPT

## 2025-04-28 PROCEDURE — 85652 RBC SED RATE AUTOMATED: CPT

## 2025-04-28 PROCEDURE — 86140 C-REACTIVE PROTEIN: CPT

## 2025-04-28 PROCEDURE — 85025 COMPLETE CBC W/AUTO DIFF WBC: CPT

## 2025-04-28 PROCEDURE — 80053 COMPREHEN METABOLIC PANEL: CPT

## 2025-05-07 ENCOUNTER — OFFICE VISIT (OUTPATIENT)
Dept: FAMILY MEDICINE CLINIC | Age: 70
End: 2025-05-07
Payer: MEDICARE

## 2025-05-07 VITALS
SYSTOLIC BLOOD PRESSURE: 131 MMHG | TEMPERATURE: 97.8 F | WEIGHT: 109.6 LBS | HEART RATE: 69 BPM | BODY MASS INDEX: 22.09 KG/M2 | OXYGEN SATURATION: 99 % | DIASTOLIC BLOOD PRESSURE: 72 MMHG | HEIGHT: 59 IN

## 2025-05-07 DIAGNOSIS — M05.742 RHEUMATOID ARTHRITIS INVOLVING BOTH HANDS WITH POSITIVE RHEUMATOID FACTOR: ICD-10-CM

## 2025-05-07 DIAGNOSIS — F41.1 GENERALIZED ANXIETY DISORDER: Chronic | ICD-10-CM

## 2025-05-07 DIAGNOSIS — K21.9 GASTROESOPHAGEAL REFLUX DISEASE WITHOUT ESOPHAGITIS: ICD-10-CM

## 2025-05-07 DIAGNOSIS — I10 ESSENTIAL HYPERTENSION: Primary | ICD-10-CM

## 2025-05-07 DIAGNOSIS — F33.1 MODERATE EPISODE OF RECURRENT MAJOR DEPRESSIVE DISORDER: ICD-10-CM

## 2025-05-07 DIAGNOSIS — M05.741 RHEUMATOID ARTHRITIS INVOLVING BOTH HANDS WITH POSITIVE RHEUMATOID FACTOR: ICD-10-CM

## 2025-05-07 RX ORDER — ONDANSETRON 8 MG/1
8 TABLET, ORALLY DISINTEGRATING ORAL 3 TIMES DAILY
Qty: 30 TABLET | Refills: 0 | Status: SHIPPED | OUTPATIENT
Start: 2025-05-07

## 2025-05-07 RX ORDER — ATENOLOL 25 MG/1
50 TABLET ORAL DAILY
Qty: 180 TABLET | Refills: 1 | Status: SHIPPED | OUTPATIENT
Start: 2025-05-07

## 2025-05-07 RX ORDER — ESOMEPRAZOLE MAGNESIUM 40 MG/1
40 CAPSULE, DELAYED RELEASE ORAL DAILY
Qty: 90 CAPSULE | Refills: 1 | Status: SHIPPED | OUTPATIENT
Start: 2025-05-07

## 2025-05-07 RX ORDER — DULOXETIN HYDROCHLORIDE 60 MG/1
60 CAPSULE, DELAYED RELEASE ORAL 2 TIMES DAILY
Qty: 180 CAPSULE | Refills: 1 | Status: SHIPPED | OUTPATIENT
Start: 2025-05-07

## 2025-05-07 RX ORDER — AMLODIPINE BESYLATE 2.5 MG/1
2.5 TABLET ORAL DAILY
Qty: 90 TABLET | Refills: 1 | Status: SHIPPED | OUTPATIENT
Start: 2025-05-07

## 2025-05-07 RX ORDER — LOSARTAN POTASSIUM 100 MG/1
100 TABLET ORAL DAILY
Qty: 90 TABLET | Refills: 1 | Status: SHIPPED | OUTPATIENT
Start: 2025-05-07

## 2025-05-07 RX ORDER — BUSPIRONE HYDROCHLORIDE 10 MG/1
10 TABLET ORAL 3 TIMES DAILY
Qty: 90 TABLET | Refills: 5 | Status: SHIPPED | OUTPATIENT
Start: 2025-05-07

## 2025-05-07 NOTE — ASSESSMENT & PLAN NOTE
Blood pressure has been running at goal.  Continue amlodipine 2.5 mg daily, atenolol 50 mg daily, and losartan 100 mg daily.  Refills were needed today.  Labs were not needed today.  Continue to monitor.  Orders:    amLODIPine (NORVASC) 2.5 MG tablet; Take 1 tablet by mouth Daily. for blood pressure    atenolol (TENORMIN) 25 MG tablet; Take 2 tablets by mouth Daily.    losartan (COZAAR) 100 MG tablet; Take 1 tablet by mouth Daily. for blood pressure

## 2025-05-07 NOTE — ASSESSMENT & PLAN NOTE
Stable on esomeprazole 40 mg daily.  Refills were needed today.  Continue to monitor.  Wean PPI as able.  Orders:    esomeprazole (nexIUM) 40 MG capsule; Take 1 capsule by mouth Daily.    ondansetron ODT (ZOFRAN-ODT) 8 MG disintegrating tablet; Take 1 tablet by mouth 3 times a day.

## 2025-05-07 NOTE — PROGRESS NOTES
"Chief Complaint  Hypertension    Subjective          Jaqueline Katz presents to [facility] today for follow-up of routine issues.    She went for bladder surgery back in march.  Surgery went well but she unfortunately had to be given Narcan after oxycodone was administered intraoperatively.  Since then, she has had no bladder issues.  She has been avoiding lifting anything heavy.       She is on losartan, amlodipine and atenolol for HTN. BP looks good today.       She is on duloxetine and buspirone for anxiety and depression.  Mood has been \"pretty good.\"  She has seen [facility] in the past.  She has some mild anxiety at times but the medication does a so-so job of controlling this.  She has used some marijuana gummies at times when the anxiety really flared.      She is on methotrexate for RA.  She follows with Dr. Bienvenido Cordero in [place].     She is on esomeprazole for GERD.  S/p EGD with Dr. [facility] at [place] that showed gastritis.  She is on Linzess for constipation.     She is on Prolia for osteoporosis.  She is up-to-date on DEXA, last done 2/2024 and this showed osteoporosis.      Current Outpatient Medications:     amLODIPine (NORVASC) 2.5 MG tablet, Take 1 tablet by mouth Daily. for blood pressure, Disp: 90 tablet, Rfl: 1    atenolol (TENORMIN) 25 MG tablet, Take 2 tablets by mouth Daily., Disp: 180 tablet, Rfl: 1    busPIRone (BUSPAR) 10 MG tablet, Take 1 tablet by mouth 3 (Three) Times a Day., Disp: 90 tablet, Rfl: 5    celecoxib (CeleBREX) 200 MG capsule, TAKE ONE CAPSULE BY MOUTH TWICE DAILY WITH FOOD AS NEEDED, Disp: 90 capsule, Rfl: 1    cetirizine (zyrTEC) 10 MG tablet, Take 1 tablet every day by oral route., Disp: , Rfl:     DULoxetine (CYMBALTA) 60 MG capsule, Take 1 capsule by mouth 2 (Two) Times a Day., Disp: 180 capsule, Rfl: 1    esomeprazole (nexIUM) 40 MG capsule, Take 1 capsule by mouth Daily., Disp: 90 capsule, Rfl: 1    estradiol " "(ESTRACE) 0.1 MG/GM vaginal cream, Insert 1 g into the vagina 3 (Three) Times a Week., Disp: , Rfl:     folic acid (FOLVITE) 1 MG tablet, Take 3 tablets by mouth once daily, Disp: 90 tablet, Rfl: 3    Linzess 145 MCG capsule capsule, Take 1 capsule by mouth Every Morning., Disp: , Rfl:     losartan (COZAAR) 100 MG tablet, Take 1 tablet by mouth Daily. for blood pressure, Disp: 90 tablet, Rfl: 1    methotrexate 2.5 MG tablet, Take 8 tablets by mouth 1 (One) Time Per Week., Disp: 96 tablet, Rfl: 0    montelukast (SINGULAIR) 10 MG tablet, TAKE ONE TABLET BY MOUTH EVERY EVENING, Disp: 90 tablet, Rfl: 3    olopatadine (PATANOL) 0.1 % ophthalmic solution, Patanol 0.1 % eye drops  INSTILL 1 DROP INTO AFFECTED EYE(S) BY OPHTHALMIC ROUTE 2 TIMES PER DAY AT AN INTERVAL OF 6 TO 8 HOURS, Disp: , Rfl:     ondansetron ODT (ZOFRAN-ODT) 8 MG disintegrating tablet, Take 1 tablet by mouth 3 times a day., Disp: 30 tablet, Rfl: 0    Semaglutide-Weight Management (Wegovy) 0.25 MG/0.5ML solution auto-injector, Inject 0.5 mL under the skin into the appropriate area as directed 1 (One) Time Per Week. Pt has not started medication yet., Disp: , Rfl:   Medications Discontinued During This Encounter   Medication Reason    ondansetron ODT (ZOFRAN-ODT) 8 MG disintegrating tablet Reorder    losartan (COZAAR) 100 MG tablet Reorder    esomeprazole (nexIUM) 40 MG capsule Reorder    amLODIPine (NORVASC) 2.5 MG tablet Reorder    atenolol (TENORMIN) 25 MG tablet Reorder    busPIRone (BUSPAR) 10 MG tablet Reorder    DULoxetine (CYMBALTA) 60 MG capsule Reorder         Allergies:  Oxycodone, Penicillins, and Nsaids      Objective   Vital Signs:   Vitals:    05/07/25 1303   BP: 131/72   Pulse: 69   Temp: 97.8 °F (36.6 °C)   TempSrc: Oral   SpO2: 99%   Weight: 49.7 kg (109 lb 9.6 oz)   Height: 149.9 cm (59.02\")     Body mass index is 22.12 kg/m².  BMI is within normal parameters. No other follow-up for BMI required.        Physical Exam  Vitals reviewed. "   Constitutional:       General: She is not in acute distress.     Appearance: Normal appearance. She is well-developed.   HENT:      Head: Normocephalic and atraumatic.      Right Ear: External ear normal.      Left Ear: External ear normal.   Eyes:      Extraocular Movements: Extraocular movements intact.      Conjunctiva/sclera: Conjunctivae normal.      Pupils: Pupils are equal, round, and reactive to light.   Cardiovascular:      Rate and Rhythm: Normal rate and regular rhythm.      Heart sounds: No murmur heard.  Pulmonary:      Effort: Pulmonary effort is normal.      Breath sounds: Normal breath sounds. No wheezing, rhonchi or rales.   Abdominal:      General: Bowel sounds are normal. There is no distension.      Palpations: Abdomen is soft.      Tenderness: There is no abdominal tenderness.   Musculoskeletal:         General: Normal range of motion.   Neurological:      Mental Status: She is alert.   Psychiatric:         Mood and Affect: Affect normal.           Lab Results   Component Value Date    GLUCOSE 94 04/28/2025    BUN 13 04/28/2025    CREATININE 0.78 04/28/2025    EGFRIFNONA 59 (L) 02/07/2022    BCR 16.7 04/28/2025    K 3.7 04/28/2025    CO2 23.0 04/28/2025    CALCIUM 9.4 04/28/2025    ALBUMIN 4.2 04/28/2025    AST 24 04/28/2025    ALT 19 04/28/2025       Lab Results   Component Value Date    CHOL 177 12/13/2024    CHLPL 215 (H) 03/24/2021    TRIG 132 12/13/2024    HDL 52 12/13/2024     (H) 12/13/2024       Lab Results   Component Value Date    WBC 7.10 04/28/2025    HGB 11.6 (L) 04/28/2025    HCT 36.8 04/28/2025    MCV 90.4 04/28/2025     04/28/2025            Assessment and Plan    Assessment & Plan  Essential hypertension    Blood pressure has been running at goal.  Continue amlodipine 2.5 mg daily, atenolol 50 mg daily, and losartan 100 mg daily.  Refills were needed today.  Labs were not needed today.  Continue to monitor.  Orders:    amLODIPine (NORVASC) 2.5 MG tablet; Take 1  tablet by mouth Daily. for blood pressure    atenolol (TENORMIN) 25 MG tablet; Take 2 tablets by mouth Daily.    losartan (COZAAR) 100 MG tablet; Take 1 tablet by mouth Daily. for blood pressure    Generalized anxiety disorder    Stable on duloxetine 60 mg BID and buspirone 10 mg 3x daily.  Refills were needed today.  Continue to monitor.  Orders:    busPIRone (BUSPAR) 10 MG tablet; Take 1 tablet by mouth 3 (Three) Times a Day.    DULoxetine (CYMBALTA) 60 MG capsule; Take 1 capsule by mouth 2 (Two) Times a Day.    Moderate episode of recurrent major depressive disorder    As per anxiety plan.   Orders:    DULoxetine (CYMBALTA) 60 MG capsule; Take 1 capsule by mouth 2 (Two) Times a Day.    Gastroesophageal reflux disease without esophagitis  Stable on esomeprazole 40 mg daily.  Refills were needed today.  Continue to monitor.  Wean PPI as able.  Orders:    esomeprazole (nexIUM) 40 MG capsule; Take 1 capsule by mouth Daily.    ondansetron ODT (ZOFRAN-ODT) 8 MG disintegrating tablet; Take 1 tablet by mouth 3 times a day.    Rheumatoid arthritis involving both hands with positive rheumatoid factor  Following with Dr. [facility] in [place].  Stable on methotrexate.                 Follow Up   Return in about 6 months (around 11/7/2025) for Medicare Wellness.  Patient was given instructions and counseling regarding her condition or for health maintenance advice. Please see specific information pulled into the AVS if appropriate.           05/07/2025

## 2025-05-07 NOTE — ASSESSMENT & PLAN NOTE
Stable on duloxetine 60 mg BID and buspirone 10 mg 3x daily.  Refills were needed today.  Continue to monitor.  Orders:    busPIRone (BUSPAR) 10 MG tablet; Take 1 tablet by mouth 3 (Three) Times a Day.    DULoxetine (CYMBALTA) 60 MG capsule; Take 1 capsule by mouth 2 (Two) Times a Day.

## 2025-05-12 ENCOUNTER — TELEPHONE (OUTPATIENT)
Dept: FAMILY MEDICINE CLINIC | Age: 70
End: 2025-05-12
Payer: MEDICARE

## 2025-05-12 DIAGNOSIS — Z87.891 PERSONAL HISTORY OF TOBACCO USE, PRESENTING HAZARDS TO HEALTH: Primary | ICD-10-CM

## 2025-05-12 NOTE — TELEPHONE ENCOUNTER
----- Message from Rosalinda Irizarry sent at 5/10/2024  4:58 PM EDT -----  Please notify pt that it is time for  yearly low dose CT chest to screen for lung cancer.  Please pend order to me.  Thanks, BZLYNNETTE

## 2025-05-16 DIAGNOSIS — K21.9 GASTROESOPHAGEAL REFLUX DISEASE WITHOUT ESOPHAGITIS: ICD-10-CM

## 2025-05-16 RX ORDER — ONDANSETRON 8 MG/1
TABLET, ORALLY DISINTEGRATING ORAL
Qty: 30 TABLET | Refills: 0 | OUTPATIENT
Start: 2025-05-16

## 2025-05-21 NOTE — ASSESSMENT & PLAN NOTE
Onset early 2021. Rheumatoid factor and CCP positive.  BRITTA positive.  Pain and swelling in MCP joints and MTP joints.    RA is doing well. No active swelling.   Tender joint count is  0, swollen joint count is  0, physician global is 0 , visual analog pain scale is 4, pt global is 2.  CDAI is 2-low disease activity   New lab order provided to continue labs every 6 weeks  Continue MTX at 20 mg weekly.   Take folic acid 3 q day for oral ulcers. .   Recheck in 3 months

## 2025-05-21 NOTE — ASSESSMENT & PLAN NOTE
Stable on Prolia.  She is due soon.  DEXA is up-to-date, last done 2/2024 showing osteoporosis.

## 2025-05-21 NOTE — PROGRESS NOTES
Office Follow Up      Date: 05/29/2025   Patient Name: Jaqueline Katz  MRN: [id number]  YOB: 1955    Referring Physician: No ref. provider found     Chief Complaint: Rheumatoid arthritis.      History of Present Illness: Jaqueline Katz is a 69 y.o. female who is here today for follow up on rheumatoid arthritis.  RA is doing well. No swelling.    No SE's with MTX except possible associated oral ulcers. No infections.   Joints are doing well without swelling or significant pain.    R knee is still unstable.  Had ACL and MCL tears in R knee. Had repair of MCL.   Celebrex helps with joint pain.  Pain scale: 4/10.  EMS is 1 hr.   No night pain  The problem has not changed. The primary symptoms reported include: pain and stiffness. The following symptoms are not reported:  swelling. The patient assesses the interval disease activity as: stable. The patient's assessment of treatment is: helping some. The patient is not experiencing side effects. The locations affected since last visit are low back and right knee. Associated symptoms include AM stiffness (1 Hours). Pertinent negatives include fever, infection, fatigue, inactivity gelling, eye symptoms, change in vision, sicca complaints, skin lesion(s), chest pain, changing cough, edema, joint swelling and abdominal pain.  Has been well otherwise. Has facial rash. Has not seen derm. Looks like acne per history.     Subjective   Review of Systems: Review of Systems   Constitutional:  Positive for fatigue and unexpected weight gain. Negative for chills, fever and unexpected weight loss.        Night sweats    HENT:  Positive for sore throat. Negative for dental problem, mouth sores, sinus pressure and swollen glands.         Oral ulcers     Eyes:  Negative for photophobia, pain and redness.   Respiratory:  Negative for apnea, cough, chest tightness and shortness of breath.    Cardiovascular:  Negative for chest pain and leg swelling.    Gastrointestinal:  Positive for abdominal distention and nausea. Negative for abdominal pain, diarrhea and GERD.        Hemorrhoids   Early satiety    Genitourinary:  Negative for dysuria and hematuria.        Nocturia     Musculoskeletal:         Morning stiffness     Skin:  Negative for dry skin, rash, skin lesions and bruise.        Itching    Allergic/Immunologic: Positive for environmental allergies.   Neurological:  Positive for dizziness and headache. Negative for seizures, syncope, weakness and memory problem.   Hematological:  Negative for adenopathy. Does not bruise/bleed easily.   Psychiatric/Behavioral:  Negative for sleep disturbance, suicidal ideas, depressed mood and stress. The patient is nervous/anxious.    All other systems reviewed and are negative.       Medications:   Current Outpatient Medications:     amLODIPine (NORVASC) 2.5 MG tablet, Take 1 tablet by mouth Daily. for blood pressure, Disp: 90 tablet, Rfl: 1    atenolol (TENORMIN) 25 MG tablet, Take 2 tablets by mouth Daily., Disp: 180 tablet, Rfl: 1    busPIRone (BUSPAR) 10 MG tablet, Take 1 tablet by mouth 3 (Three) Times a Day., Disp: 90 tablet, Rfl: 5    celecoxib (CeleBREX) 200 MG capsule, Take 1 capsule by mouth Daily., Disp: 90 capsule, Rfl: 1    cetirizine (zyrTEC) 10 MG tablet, Take 1 tablet every day by oral route., Disp: , Rfl:     DULoxetine (CYMBALTA) 60 MG capsule, Take 1 capsule by mouth 2 (Two) Times a Day., Disp: 180 capsule, Rfl: 1    esomeprazole (nexIUM) 40 MG capsule, Take 1 capsule by mouth Daily., Disp: 90 capsule, Rfl: 1    estradiol (ESTRACE) 0.1 MG/GM vaginal cream, Insert 1 g into the vagina 3 (Three) Times a Week., Disp: , Rfl:     folic acid (FOLVITE) 1 MG tablet, Take 3 tablets by mouth Daily., Disp: 90 tablet, Rfl: 3    Linzess 145 MCG capsule capsule, Take 1 capsule by mouth Every Morning., Disp: , Rfl:     losartan (COZAAR) 100 MG tablet, Take 1 tablet by mouth Daily. for blood pressure, Disp: 90 tablet,  "Rfl: 1    methotrexate 2.5 MG tablet, Take 8 tablets by mouth 1 (One) Time Per Week., Disp: 96 tablet, Rfl: 0    montelukast (SINGULAIR) 10 MG tablet, TAKE ONE TABLET BY MOUTH EVERY EVENING, Disp: 90 tablet, Rfl: 3    olopatadine (PATANOL) 0.1 % ophthalmic solution, Patanol 0.1 % eye drops  INSTILL 1 DROP INTO AFFECTED EYE(S) BY OPHTHALMIC ROUTE 2 TIMES PER DAY AT AN INTERVAL OF 6 TO 8 HOURS, Disp: , Rfl:     ondansetron ODT (ZOFRAN-ODT) 8 MG disintegrating tablet, Take 1 tablet by mouth 3 times a day., Disp: 30 tablet, Rfl: 0    Semaglutide-Weight Management (Wegovy) 0.25 MG/0.5ML solution auto-injector, Inject 0.5 mL under the skin into the appropriate area as directed 1 (One) Time Per Week. Pt has not started medication yet., Disp: , Rfl:     Allergies:   Allergies   Allergen Reactions    Oxycodone Other (See Comments)     Patient has required Narcan twice following Opiod administration    Penicillins Hives    Nsaids Nausea And Vomiting       I have reviewed and updated the patient's chief complaint, history of present illness, review of systems, past medical history, surgical history, family history, social history, medications and allergy list as appropriate.     Objective    Vital Signs:   Vitals:    05/29/25 1334   BP: 124/76   Pulse: 82   Temp: 97.8 °F (36.6 °C)   TempSrc: Infrared   Weight: 49.5 kg (109 lb 1.6 oz)   Height: 149.9 cm (59.02\")   PainSc: 0-No pain         Body mass index is 22.02 kg/m².    Physical Exam:  GENERAL:  The patient is well-developed and well nourished.  Cooperative and oriented x3.  Affect is normal.  Hydration appears normal.  HEENT:  Normocephalic and atraumatic.  No notable alopecia.  No facial rash.  Lids and conjunctiva are normal.  Pupils are equal and sclerae are clear.  I see no oral or nasal ulcers.  Lips, teeth, and gums are within normal limits.  Oropharynx is clear.  NECK:  Neck is supple without adenopathy, masses, or thyromegaly.  CARDIOVASCULAR:  Normal S1, S2.  No " murmurs are heard.  LUNGS:  Clear to auscultation   ABDOMEN:  Soft and nontender without HSM.   EXTREMITIES:  No edema.  No cyanosis or clubbing.  SKIN: She has acneiform appearing lesions on her nose and cheeks.  NEUROLOGIC:  Gait is normal.   MUSCULOSKELETAL:  Complete joint exam was performed.  There was full range of motion of the shoulders, elbows, wrists, and hands without notable deformities, soft tissue swelling, synovitis, or atrophy except as noted.  Heberden's and Emmanuel's nodes are present.  Hips have good flexion and internal and external rotation.  Knees have no palpable effusions. Crepitus in R knee.  There is full flexion and full extension of the knees without pain.  Ankles have no soft tissue swelling or synovitis or major deformities.   BACK:  Straight without scoliosis.        Assessment / Plan      Assessment & Plan  Rheumatoid arthritis involving multiple sites with positive rheumatoid factor  Onset early 2021. Rheumatoid factor and CCP positive.  BRITTA positive.  Pain and swelling in MCP joints and MTP joints.    RA is doing well. No active swelling.   Tender joint count is  0, swollen joint count is  0, physician global is 0 , visual analog pain scale is 4, pt global is 2.  CDAI is 2-low disease activity   New lab order provided to continue labs every 6 weeks  Continue MTX at 20 mg weekly.   Take folic acid 3 q day for oral ulcers. .   Recheck in 3 months  High risk medication use  Methotrexate  Well-tolerated  Primary osteoarthritis of both hands  She has Heberden's and Emmanuel's nodes.  Stable  Age-related osteoporosis without current pathological fracture  Stable on Prolia.  She is due soon.  DEXA is up-to-date, last done 2/2024 showing osteoporosis.         Positive BRITTA (antinuclear antibody)  1: 640 homogenous.  Subtypes all -10/21.  No evidence of BRITTA associated disease.  Gastroesophageal reflux disease, unspecified whether esophagitis present  Tolerating Celebrex.   Bilateral carpal  tunnel syndrome  Status post bilateral release with good results.  Arthralgia of right knee  History of ACL and MCL tears with instability and chronic pain.  MCL was repaired but she is still having pain and instability.  Long term (current) use of non-steroidal anti-inflammatories (nsaid)  Celebrex.   Risks of nonsteroidal anti-inflammatory drugs discussed including GI upset, GI bleeding, renal and hepatic risk, and the risks of cardiovascular disease.  Warned not to take with other NSAIDs including over-the-counter NSAIDs.      Orders Placed This Encounter   Procedures    CBC Auto Differential    Comprehensive Metabolic Panel    C-reactive Protein    Sedimentation Rate         New Medications Ordered This Visit   Medications    celecoxib (CeleBREX) 200 MG capsule     Sig: Take 1 capsule by mouth Daily.     Dispense:  90 capsule     Refill:  1     This prescription was filled on 6/4/2024. Any refills authorized will be placed on file.    folic acid (FOLVITE) 1 MG tablet     Sig: Take 3 tablets by mouth Daily.     Dispense:  90 tablet     Refill:  3    methotrexate 2.5 MG tablet     Sig: Take 8 tablets by mouth 1 (One) Time Per Week.     Dispense:  96 tablet     Refill:  0              Follow Up:   Return in about 3 months (around 8/29/2025).        Tony Faria MD  [facility]

## 2025-05-29 ENCOUNTER — OFFICE VISIT (OUTPATIENT)
Age: 70
End: 2025-05-29
Payer: MEDICARE

## 2025-05-29 VITALS
WEIGHT: 109.1 LBS | HEIGHT: 59 IN | HEART RATE: 82 BPM | SYSTOLIC BLOOD PRESSURE: 124 MMHG | DIASTOLIC BLOOD PRESSURE: 76 MMHG | BODY MASS INDEX: 22 KG/M2 | TEMPERATURE: 97.8 F

## 2025-05-29 DIAGNOSIS — M81.0 AGE-RELATED OSTEOPOROSIS WITHOUT CURRENT PATHOLOGICAL FRACTURE: ICD-10-CM

## 2025-05-29 DIAGNOSIS — Z79.899 HIGH RISK MEDICATION USE: ICD-10-CM

## 2025-05-29 DIAGNOSIS — Z79.1 LONG TERM (CURRENT) USE OF NON-STEROIDAL ANTI-INFLAMMATORIES (NSAID): ICD-10-CM

## 2025-05-29 DIAGNOSIS — M19.041 PRIMARY OSTEOARTHRITIS OF BOTH HANDS: ICD-10-CM

## 2025-05-29 DIAGNOSIS — R76.8 POSITIVE ANA (ANTINUCLEAR ANTIBODY): ICD-10-CM

## 2025-05-29 DIAGNOSIS — G56.03 BILATERAL CARPAL TUNNEL SYNDROME: ICD-10-CM

## 2025-05-29 DIAGNOSIS — M25.561 ARTHRALGIA OF RIGHT KNEE: ICD-10-CM

## 2025-05-29 DIAGNOSIS — M19.042 PRIMARY OSTEOARTHRITIS OF BOTH HANDS: ICD-10-CM

## 2025-05-29 DIAGNOSIS — K21.9 GASTROESOPHAGEAL REFLUX DISEASE, UNSPECIFIED WHETHER ESOPHAGITIS PRESENT: ICD-10-CM

## 2025-05-29 DIAGNOSIS — M05.79 RHEUMATOID ARTHRITIS INVOLVING MULTIPLE SITES WITH POSITIVE RHEUMATOID FACTOR: Primary | ICD-10-CM

## 2025-05-29 RX ORDER — FOLIC ACID 1 MG/1
3000 TABLET ORAL DAILY
Qty: 90 TABLET | Refills: 3 | Status: SHIPPED | OUTPATIENT
Start: 2025-05-29

## 2025-05-29 RX ORDER — METHOTREXATE 2.5 MG/1
20 TABLET ORAL WEEKLY
Qty: 96 TABLET | Refills: 0 | Status: SHIPPED | OUTPATIENT
Start: 2025-05-29

## 2025-05-29 RX ORDER — CELECOXIB 200 MG/1
200 CAPSULE ORAL DAILY
Qty: 90 CAPSULE | Refills: 1 | Status: SHIPPED | OUTPATIENT
Start: 2025-05-29

## 2025-05-29 NOTE — ASSESSMENT & PLAN NOTE
Celebrex.   Risks of nonsteroidal anti-inflammatory drugs discussed including GI upset, GI bleeding, renal and hepatic risk, and the risks of cardiovascular disease.  Warned not to take with other NSAIDs including over-the-counter NSAIDs.

## 2025-06-02 ENCOUNTER — HOSPITAL ENCOUNTER (OUTPATIENT)
Dept: CT IMAGING | Facility: HOSPITAL | Age: 70
Discharge: HOME | End: 2025-06-02
Admitting: FAMILY MEDICINE
Payer: MEDICARE

## 2025-06-02 DIAGNOSIS — Z87.891 PERSONAL HISTORY OF TOBACCO USE, PRESENTING HAZARDS TO HEALTH: ICD-10-CM

## 2025-06-02 PROCEDURE — 71271 CT THORAX LUNG CANCER SCR C-: CPT

## 2025-06-16 ENCOUNTER — TELEPHONE (OUTPATIENT)
Age: 70
End: 2025-06-16

## 2025-06-16 NOTE — TELEPHONE ENCOUNTER
PT CALLED IN. STATES THAT HER PHARMACIST WARNED HER OF POSSIBLE NEGATIVES TAKING CELEBREX AND METHOTREXATE TOGETHER. SHE WAS CALLING IN TO MAKE SURE THAT IT WAS OKAY FOR HER TO TAKE BOTH. PLEASE CALL AND ADVISE

## 2025-08-08 ENCOUNTER — LAB (OUTPATIENT)
Dept: LAB | Facility: HOSPITAL | Age: 70
End: 2025-08-08
Payer: MEDICARE

## 2025-08-08 DIAGNOSIS — M05.79 RHEUMATOID ARTHRITIS INVOLVING MULTIPLE SITES WITH POSITIVE RHEUMATOID FACTOR: ICD-10-CM

## 2025-08-08 LAB
ALBUMIN SERPL-MCNC: 3.8 G/DL (ref 3.5–5.2)
ALBUMIN/GLOB SERPL: 1.4 G/DL
ALP SERPL-CCNC: 64 U/L (ref 39–117)
ALT SERPL W P-5'-P-CCNC: 14 U/L (ref 1–33)
ANION GAP SERPL CALCULATED.3IONS-SCNC: 9.4 MMOL/L (ref 5–15)
AST SERPL-CCNC: 16 U/L (ref 1–32)
BASOPHILS # BLD AUTO: 0.05 10*3/MM3 (ref 0–0.2)
BASOPHILS NFR BLD AUTO: 0.8 % (ref 0–1.5)
BILIRUB SERPL-MCNC: <0.2 MG/DL (ref 0–1.2)
BUN SERPL-MCNC: 19 MG/DL (ref 8–23)
BUN/CREAT SERPL: 22.9 (ref 7–25)
CALCIUM SPEC-SCNC: 9.3 MG/DL (ref 8.6–10.5)
CHLORIDE SERPL-SCNC: 103 MMOL/L (ref 98–107)
CO2 SERPL-SCNC: 25.6 MMOL/L (ref 22–29)
CREAT SERPL-MCNC: 0.83 MG/DL (ref 0.57–1)
CRP SERPL-MCNC: <0.3 MG/DL (ref 0–0.5)
DEPRECATED RDW RBC AUTO: 51.9 FL (ref 37–54)
EGFRCR SERPLBLD CKD-EPI 2021: 75.9 ML/MIN/1.73
EOSINOPHIL # BLD AUTO: 0.06 10*3/MM3 (ref 0–0.4)
EOSINOPHIL NFR BLD AUTO: 0.9 % (ref 0.3–6.2)
ERYTHROCYTE [DISTWIDTH] IN BLOOD BY AUTOMATED COUNT: 16.5 % (ref 12.3–15.4)
ERYTHROCYTE [SEDIMENTATION RATE] IN BLOOD: <1 MM/HR (ref 0–30)
GLOBULIN UR ELPH-MCNC: 2.7 GM/DL
GLUCOSE SERPL-MCNC: 105 MG/DL (ref 65–99)
HCT VFR BLD AUTO: 34.7 % (ref 34–46.6)
HGB BLD-MCNC: 11.2 G/DL (ref 12–15.9)
IMM GRANULOCYTES # BLD AUTO: 0.02 10*3/MM3 (ref 0–0.05)
IMM GRANULOCYTES NFR BLD AUTO: 0.3 % (ref 0–0.5)
LYMPHOCYTES # BLD AUTO: 1.8 10*3/MM3 (ref 0.7–3.1)
LYMPHOCYTES NFR BLD AUTO: 27.1 % (ref 19.6–45.3)
MCH RBC QN AUTO: 27.8 PG (ref 26.6–33)
MCHC RBC AUTO-ENTMCNC: 32.3 G/DL (ref 31.5–35.7)
MCV RBC AUTO: 86.1 FL (ref 79–97)
MONOCYTES # BLD AUTO: 0.56 10*3/MM3 (ref 0.1–0.9)
MONOCYTES NFR BLD AUTO: 8.4 % (ref 5–12)
NEUTROPHILS NFR BLD AUTO: 4.16 10*3/MM3 (ref 1.7–7)
NEUTROPHILS NFR BLD AUTO: 62.5 % (ref 42.7–76)
PLATELET # BLD AUTO: 356 10*3/MM3 (ref 140–450)
PMV BLD AUTO: 9.5 FL (ref 6–12)
POTASSIUM SERPL-SCNC: 3.9 MMOL/L (ref 3.5–5.2)
PROT SERPL-MCNC: 6.5 G/DL (ref 6–8.5)
RBC # BLD AUTO: 4.03 10*6/MM3 (ref 3.77–5.28)
SODIUM SERPL-SCNC: 138 MMOL/L (ref 136–145)
WBC NRBC COR # BLD AUTO: 6.65 10*3/MM3 (ref 3.4–10.8)

## 2025-08-08 PROCEDURE — 85025 COMPLETE CBC W/AUTO DIFF WBC: CPT

## 2025-08-08 PROCEDURE — 36415 COLL VENOUS BLD VENIPUNCTURE: CPT

## 2025-08-08 PROCEDURE — 85652 RBC SED RATE AUTOMATED: CPT

## 2025-08-08 PROCEDURE — 80053 COMPREHEN METABOLIC PANEL: CPT

## 2025-08-08 PROCEDURE — 86140 C-REACTIVE PROTEIN: CPT

## 2025-08-18 DIAGNOSIS — K21.9 GASTROESOPHAGEAL REFLUX DISEASE WITHOUT ESOPHAGITIS: ICD-10-CM

## 2025-08-20 RX ORDER — METHOTREXATE 2.5 MG/1
20 TABLET ORAL WEEKLY
Qty: 96 TABLET | Refills: 0 | Status: SHIPPED | OUTPATIENT
Start: 2025-08-20

## 2025-08-20 RX ORDER — ONDANSETRON 8 MG/1
TABLET, ORALLY DISINTEGRATING ORAL
Qty: 30 TABLET | Refills: 0 | Status: SHIPPED | OUTPATIENT
Start: 2025-08-20